# Patient Record
Sex: FEMALE | Race: WHITE | NOT HISPANIC OR LATINO | Employment: UNEMPLOYED | URBAN - NONMETROPOLITAN AREA
[De-identification: names, ages, dates, MRNs, and addresses within clinical notes are randomized per-mention and may not be internally consistent; named-entity substitution may affect disease eponyms.]

---

## 2018-01-07 ENCOUNTER — APPOINTMENT (OUTPATIENT)
Dept: GENERAL RADIOLOGY | Facility: HOSPITAL | Age: 63
End: 2018-01-07

## 2018-01-07 ENCOUNTER — HOSPITAL ENCOUNTER (INPATIENT)
Facility: HOSPITAL | Age: 63
LOS: 9 days | Discharge: NURSING, REHAB OR LONG-TERM CARE | End: 2018-01-17
Attending: EMERGENCY MEDICINE | Admitting: INTERNAL MEDICINE

## 2018-01-07 DIAGNOSIS — R13.10 DYSPHAGIA, UNSPECIFIED TYPE: Primary | ICD-10-CM

## 2018-01-07 DIAGNOSIS — J69.0 ASPIRATION PNEUMONIA OF RIGHT LOWER LOBE, UNSPECIFIED ASPIRATION PNEUMONIA TYPE (HCC): ICD-10-CM

## 2018-01-07 DIAGNOSIS — A41.9 SEPSIS, DUE TO UNSPECIFIED ORGANISM: ICD-10-CM

## 2018-01-07 LAB
A-A DO2: 61.9 MMHG (ref 0–300)
ALBUMIN SERPL-MCNC: 3.8 G/DL (ref 3.4–4.8)
ALBUMIN/GLOB SERPL: 1 G/DL (ref 1.5–2.5)
ALP SERPL-CCNC: 82 U/L (ref 35–104)
ALT SERPL W P-5'-P-CCNC: 42 U/L (ref 10–36)
ANION GAP SERPL CALCULATED.3IONS-SCNC: 11.8 MMOL/L (ref 3.6–11.2)
ARTERIAL PATENCY WRIST A: ABNORMAL
AST SERPL-CCNC: 35 U/L (ref 10–30)
ATMOSPHERIC PRESS: 735 MMHG
BACTERIA UR QL AUTO: ABNORMAL /HPF
BASE EXCESS BLDA CALC-SCNC: 0.8 MMOL/L
BASOPHILS # BLD AUTO: 0.03 10*3/MM3 (ref 0–0.3)
BASOPHILS # BLD AUTO: 0.04 10*3/MM3 (ref 0–0.3)
BASOPHILS NFR BLD AUTO: 0.2 % (ref 0–2)
BASOPHILS NFR BLD AUTO: 0.2 % (ref 0–2)
BDY SITE: ABNORMAL
BILIRUB SERPL-MCNC: 0.6 MG/DL (ref 0.2–1.8)
BILIRUB UR QL STRIP: NEGATIVE
BODY TEMPERATURE: 98.6 C
BUN BLD-MCNC: 20 MG/DL (ref 7–21)
BUN/CREAT SERPL: 24.7 (ref 7–25)
CALCIUM SPEC-SCNC: 9 MG/DL (ref 7.7–10)
CHLORIDE SERPL-SCNC: 126 MMOL/L (ref 99–112)
CLARITY UR: ABNORMAL
CO2 SERPL-SCNC: 24.2 MMOL/L (ref 24.3–31.9)
COHGB MFR BLD: 1.6 % (ref 0–5)
COLOR UR: ABNORMAL
CREAT BLD-MCNC: 0.81 MG/DL (ref 0.43–1.29)
D-LACTATE SERPL-SCNC: 2.7 MMOL/L (ref 0.5–2)
DEPRECATED RDW RBC AUTO: 49.1 FL (ref 37–54)
DEPRECATED RDW RBC AUTO: 49.5 FL (ref 37–54)
EOSINOPHIL # BLD AUTO: 0 10*3/MM3 (ref 0–0.7)
EOSINOPHIL # BLD AUTO: 0 10*3/MM3 (ref 0–0.7)
EOSINOPHIL NFR BLD AUTO: 0 % (ref 0–5)
EOSINOPHIL NFR BLD AUTO: 0 % (ref 0–5)
ERYTHROCYTE [DISTWIDTH] IN BLOOD BY AUTOMATED COUNT: 14.6 % (ref 11.5–14.5)
ERYTHROCYTE [DISTWIDTH] IN BLOOD BY AUTOMATED COUNT: 14.9 % (ref 11.5–14.5)
FLUAV AG NPH QL: NEGATIVE
FLUBV AG NPH QL IA: NEGATIVE
GFR SERPL CREATININE-BSD FRML MDRD: 72 ML/MIN/1.73
GLOBULIN UR ELPH-MCNC: 3.8 GM/DL
GLUCOSE BLD-MCNC: 180 MG/DL (ref 70–110)
GLUCOSE UR STRIP-MCNC: NEGATIVE MG/DL
HCO3 BLDA-SCNC: 22.9 MMOL/L (ref 22–26)
HCT VFR BLD AUTO: 48.5 % (ref 37–47)
HCT VFR BLD AUTO: 49.5 % (ref 37–47)
HCT VFR BLD CALC: 45 % (ref 37–47)
HGB BLD-MCNC: 15.2 G/DL (ref 12–16)
HGB BLD-MCNC: 15.6 G/DL (ref 12–16)
HGB BLDA-MCNC: 15.4 G/DL (ref 12–16)
HGB UR QL STRIP.AUTO: ABNORMAL
HOLD SPECIMEN: NORMAL
HOLD SPECIMEN: NORMAL
HOROWITZ INDEX BLD+IHG-RTO: 21 %
HYALINE CASTS UR QL AUTO: ABNORMAL /LPF
IMM GRANULOCYTES # BLD: 0.07 10*3/MM3 (ref 0–0.03)
IMM GRANULOCYTES # BLD: 0.07 10*3/MM3 (ref 0–0.03)
IMM GRANULOCYTES NFR BLD: 0.3 % (ref 0–0.5)
IMM GRANULOCYTES NFR BLD: 0.4 % (ref 0–0.5)
KETONES UR QL STRIP: NEGATIVE
LEUKOCYTE ESTERASE UR QL STRIP.AUTO: ABNORMAL
LYMPHOCYTES # BLD AUTO: 2.29 10*3/MM3 (ref 1–3)
LYMPHOCYTES # BLD AUTO: 2.49 10*3/MM3 (ref 1–3)
LYMPHOCYTES NFR BLD AUTO: 11.6 % (ref 21–51)
LYMPHOCYTES NFR BLD AUTO: 11.7 % (ref 21–51)
MCH RBC QN AUTO: 29.4 PG (ref 27–33)
MCH RBC QN AUTO: 29.6 PG (ref 27–33)
MCHC RBC AUTO-ENTMCNC: 31.3 G/DL (ref 33–37)
MCHC RBC AUTO-ENTMCNC: 31.5 G/DL (ref 33–37)
MCV RBC AUTO: 93.8 FL (ref 80–94)
MCV RBC AUTO: 93.9 FL (ref 80–94)
METHGB BLD QL: 0.5 % (ref 0–3)
MODALITY: ABNORMAL
MONOCYTES # BLD AUTO: 1.14 10*3/MM3 (ref 0.1–0.9)
MONOCYTES # BLD AUTO: 1.14 10*3/MM3 (ref 0.1–0.9)
MONOCYTES NFR BLD AUTO: 5.4 % (ref 0–10)
MONOCYTES NFR BLD AUTO: 5.8 % (ref 0–10)
NEUTROPHILS # BLD AUTO: 16.15 10*3/MM3 (ref 1.4–6.5)
NEUTROPHILS # BLD AUTO: 17.49 10*3/MM3 (ref 1.4–6.5)
NEUTROPHILS NFR BLD AUTO: 82 % (ref 30–70)
NEUTROPHILS NFR BLD AUTO: 82.4 % (ref 30–70)
NITRITE UR QL STRIP: POSITIVE
OSMOLALITY SERPL CALC.SUM OF ELEC: 327.5 MOSM/KG (ref 273–305)
OXYHGB MFR BLDV: 82.9 % (ref 85–100)
PCO2 BLDA: 30.1 MM HG (ref 35–45)
PH BLDA: 7.5 PH UNITS (ref 7.35–7.45)
PH UR STRIP.AUTO: <=5 [PH] (ref 5–8)
PLATELET # BLD AUTO: 283 10*3/MM3 (ref 130–400)
PLATELET # BLD AUTO: 296 10*3/MM3 (ref 130–400)
PMV BLD AUTO: 11.5 FL (ref 6–10)
PMV BLD AUTO: 11.7 FL (ref 6–10)
PO2 BLDA: 46.5 MM HG (ref 80–100)
POTASSIUM BLD-SCNC: 3.7 MMOL/L (ref 3.5–5.3)
PROT SERPL-MCNC: 7.6 G/DL (ref 6–8)
PROT UR QL STRIP: ABNORMAL
RBC # BLD AUTO: 5.17 10*6/MM3 (ref 4.2–5.4)
RBC # BLD AUTO: 5.27 10*6/MM3 (ref 4.2–5.4)
RBC # UR: ABNORMAL /HPF
REF LAB TEST METHOD: ABNORMAL
SAO2 % BLDCOA: 84.7 % (ref 90–100)
SODIUM BLD-SCNC: 162 MMOL/L (ref 135–153)
SP GR UR STRIP: >1.03 (ref 1–1.03)
SQUAMOUS #/AREA URNS HPF: ABNORMAL /HPF
TROPONIN I SERPL-MCNC: 0.02 NG/ML
UROBILINOGEN UR QL STRIP: ABNORMAL
WBC NRBC COR # BLD: 19.68 10*3/MM3 (ref 4.5–12.5)
WBC NRBC COR # BLD: 21.23 10*3/MM3 (ref 4.5–12.5)
WBC UR QL AUTO: ABNORMAL /HPF
WHOLE BLOOD HOLD SPECIMEN: NORMAL
WHOLE BLOOD HOLD SPECIMEN: NORMAL

## 2018-01-07 PROCEDURE — 94799 UNLISTED PULMONARY SVC/PX: CPT

## 2018-01-07 PROCEDURE — 84484 ASSAY OF TROPONIN QUANT: CPT | Performed by: NURSE PRACTITIONER

## 2018-01-07 PROCEDURE — 83605 ASSAY OF LACTIC ACID: CPT | Performed by: NURSE PRACTITIONER

## 2018-01-07 PROCEDURE — 25010000002 VANCOMYCIN PER 500 MG: Performed by: NURSE PRACTITIONER

## 2018-01-07 PROCEDURE — 82805 BLOOD GASES W/O2 SATURATION: CPT | Performed by: NURSE PRACTITIONER

## 2018-01-07 PROCEDURE — 87186 SC STD MICRODIL/AGAR DIL: CPT | Performed by: NURSE PRACTITIONER

## 2018-01-07 PROCEDURE — 82375 ASSAY CARBOXYHB QUANT: CPT | Performed by: NURSE PRACTITIONER

## 2018-01-07 PROCEDURE — 81001 URINALYSIS AUTO W/SCOPE: CPT | Performed by: NURSE PRACTITIONER

## 2018-01-07 PROCEDURE — 87040 BLOOD CULTURE FOR BACTERIA: CPT | Performed by: NURSE PRACTITIONER

## 2018-01-07 PROCEDURE — 85025 COMPLETE CBC W/AUTO DIFF WBC: CPT | Performed by: NURSE PRACTITIONER

## 2018-01-07 PROCEDURE — 87804 INFLUENZA ASSAY W/OPTIC: CPT | Performed by: NURSE PRACTITIONER

## 2018-01-07 PROCEDURE — 36600 WITHDRAWAL OF ARTERIAL BLOOD: CPT | Performed by: NURSE PRACTITIONER

## 2018-01-07 PROCEDURE — 36415 COLL VENOUS BLD VENIPUNCTURE: CPT

## 2018-01-07 PROCEDURE — 87086 URINE CULTURE/COLONY COUNT: CPT | Performed by: NURSE PRACTITIONER

## 2018-01-07 PROCEDURE — 83050 HGB METHEMOGLOBIN QUAN: CPT | Performed by: NURSE PRACTITIONER

## 2018-01-07 PROCEDURE — 25010000002 PIPERACILLIN-TAZOBACTAM: Performed by: NURSE PRACTITIONER

## 2018-01-07 PROCEDURE — 94640 AIRWAY INHALATION TREATMENT: CPT

## 2018-01-07 PROCEDURE — 71045 X-RAY EXAM CHEST 1 VIEW: CPT | Performed by: RADIOLOGY

## 2018-01-07 PROCEDURE — 93005 ELECTROCARDIOGRAM TRACING: CPT | Performed by: NURSE PRACTITIONER

## 2018-01-07 PROCEDURE — 80053 COMPREHEN METABOLIC PANEL: CPT | Performed by: NURSE PRACTITIONER

## 2018-01-07 PROCEDURE — 71045 X-RAY EXAM CHEST 1 VIEW: CPT

## 2018-01-07 PROCEDURE — 85025 COMPLETE CBC W/AUTO DIFF WBC: CPT | Performed by: EMERGENCY MEDICINE

## 2018-01-07 PROCEDURE — 87077 CULTURE AEROBIC IDENTIFY: CPT | Performed by: NURSE PRACTITIONER

## 2018-01-07 PROCEDURE — 99285 EMERGENCY DEPT VISIT HI MDM: CPT

## 2018-01-07 PROCEDURE — 93010 ELECTROCARDIOGRAM REPORT: CPT | Performed by: INTERNAL MEDICINE

## 2018-01-07 RX ORDER — IPRATROPIUM BROMIDE AND ALBUTEROL SULFATE 2.5; .5 MG/3ML; MG/3ML
3 SOLUTION RESPIRATORY (INHALATION) ONCE
Status: COMPLETED | OUTPATIENT
Start: 2018-01-07 | End: 2018-01-07

## 2018-01-07 RX ORDER — SENNOSIDES 8.6 MG
1 TABLET ORAL DAILY
COMMUNITY

## 2018-01-07 RX ORDER — LEVOTHYROXINE SODIUM 0.12 MG/1
125 TABLET ORAL DAILY
Status: ON HOLD | COMMUNITY
End: 2019-01-01

## 2018-01-07 RX ORDER — RAMIPRIL 5 MG/1
5 CAPSULE ORAL DAILY
COMMUNITY

## 2018-01-07 RX ORDER — BACLOFEN 10 MG/1
5 TABLET ORAL 2 TIMES DAILY
COMMUNITY

## 2018-01-07 RX ORDER — SODIUM CHLORIDE 0.9 % (FLUSH) 0.9 %
10 SYRINGE (ML) INJECTION AS NEEDED
Status: DISCONTINUED | OUTPATIENT
Start: 2018-01-07 | End: 2018-01-07

## 2018-01-07 RX ORDER — SODIUM CHLORIDE 0.9 % (FLUSH) 0.9 %
10 SYRINGE (ML) INJECTION AS NEEDED
Status: DISCONTINUED | OUTPATIENT
Start: 2018-01-07 | End: 2018-01-17 | Stop reason: HOSPADM

## 2018-01-07 RX ORDER — MELATONIN
1000 DAILY
COMMUNITY

## 2018-01-07 RX ORDER — DIPHENHYDRAMINE HCL 25 MG
25 CAPSULE ORAL
COMMUNITY

## 2018-01-07 RX ORDER — ACETAMINOPHEN 650 MG/1
650 SUPPOSITORY RECTAL ONCE
Status: COMPLETED | OUTPATIENT
Start: 2018-01-07 | End: 2018-01-07

## 2018-01-07 RX ORDER — TRAMADOL HYDROCHLORIDE 50 MG/1
50 TABLET ORAL 3 TIMES DAILY
COMMUNITY

## 2018-01-07 RX ADMIN — ACETAMINOPHEN 650 MG: 650 SUPPOSITORY RECTAL at 21:11

## 2018-01-07 RX ADMIN — TAZOBACTAM SODIUM AND PIPERACILLIN SODIUM 4.5 G: .5; 4 INJECTION, POWDER, LYOPHILIZED, FOR SOLUTION INTRAVENOUS at 22:08

## 2018-01-07 RX ADMIN — IPRATROPIUM BROMIDE AND ALBUTEROL SULFATE 3 ML: .5; 3 SOLUTION RESPIRATORY (INHALATION) at 21:17

## 2018-01-07 RX ADMIN — VANCOMYCIN HYDROCHLORIDE 1000 MG: 5 INJECTION, POWDER, LYOPHILIZED, FOR SOLUTION INTRAVENOUS at 22:23

## 2018-01-07 RX ADMIN — SODIUM CHLORIDE 1000 ML: 9 INJECTION, SOLUTION INTRAVENOUS at 21:09

## 2018-01-07 RX ADMIN — SODIUM CHLORIDE 1000 ML: 9 INJECTION, SOLUTION INTRAVENOUS at 23:54

## 2018-01-07 RX ADMIN — IPRATROPIUM BROMIDE AND ALBUTEROL SULFATE 3 ML: .5; 3 SOLUTION RESPIRATORY (INHALATION) at 22:57

## 2018-01-08 ENCOUNTER — APPOINTMENT (OUTPATIENT)
Dept: GENERAL RADIOLOGY | Facility: HOSPITAL | Age: 63
End: 2018-01-08

## 2018-01-08 PROBLEM — J69.0 ASPIRATION PNEUMONIA OF RIGHT LOWER LOBE (HCC): Status: ACTIVE | Noted: 2018-01-08

## 2018-01-08 LAB
A-A DO2: >300 MMHG (ref 0–300)
ALBUMIN SERPL-MCNC: 3.2 G/DL (ref 3.4–4.8)
ALBUMIN/GLOB SERPL: 1 G/DL (ref 1.5–2.5)
ALP SERPL-CCNC: 66 U/L (ref 35–104)
ALT SERPL W P-5'-P-CCNC: 35 U/L (ref 10–36)
ANION GAP SERPL CALCULATED.3IONS-SCNC: 8.3 MMOL/L (ref 3.6–11.2)
ARTERIAL PATENCY WRIST A: ABNORMAL
AST SERPL-CCNC: 29 U/L (ref 10–30)
ATMOSPHERIC PRESS: 729 MMHG
BASE EXCESS BLDA CALC-SCNC: 0.8 MMOL/L
BASOPHILS # BLD AUTO: 0.04 10*3/MM3 (ref 0–0.3)
BASOPHILS NFR BLD AUTO: 0.2 % (ref 0–2)
BDY SITE: ABNORMAL
BILIRUB SERPL-MCNC: 0.7 MG/DL (ref 0.2–1.8)
BODY TEMPERATURE: 98.6 C
BUN BLD-MCNC: 15 MG/DL (ref 7–21)
BUN/CREAT SERPL: 23.8 (ref 7–25)
CALCIUM SPEC-SCNC: 7.8 MG/DL (ref 7.7–10)
CHLORIDE SERPL-SCNC: 131 MMOL/L (ref 99–112)
CK MB SERPL-CCNC: 0.77 NG/ML (ref 0–5)
CK MB SERPL-CCNC: 0.88 NG/ML (ref 0–5)
CK MB SERPL-RTO: 0.2 % (ref 0–3)
CK MB SERPL-RTO: 0.3 % (ref 0–3)
CK SERPL-CCNC: 274 U/L (ref 24–173)
CK SERPL-CCNC: 317 U/L (ref 24–173)
CO2 SERPL-SCNC: 21.7 MMOL/L (ref 24.3–31.9)
COHGB MFR BLD: 0.4 % (ref 0–5)
CREAT BLD-MCNC: 0.63 MG/DL (ref 0.43–1.29)
CRP SERPL-MCNC: 4.22 MG/DL (ref 0–0.99)
D-LACTATE SERPL-SCNC: 2.4 MMOL/L (ref 0.5–2)
DEPRECATED RDW RBC AUTO: 50.2 FL (ref 37–54)
EOSINOPHIL # BLD AUTO: 0 10*3/MM3 (ref 0–0.7)
EOSINOPHIL NFR BLD AUTO: 0 % (ref 0–5)
ERYTHROCYTE [DISTWIDTH] IN BLOOD BY AUTOMATED COUNT: 14.9 % (ref 11.5–14.5)
GFR SERPL CREATININE-BSD FRML MDRD: 96 ML/MIN/1.73
GLOBULIN UR ELPH-MCNC: 3.1 GM/DL
GLUCOSE BLD-MCNC: 154 MG/DL (ref 70–110)
HCO3 BLDA-SCNC: 22.8 MMOL/L (ref 22–26)
HCT VFR BLD AUTO: 43.8 % (ref 37–47)
HCT VFR BLD CALC: 39 % (ref 37–47)
HGB BLD-MCNC: 13.2 G/DL (ref 12–16)
HGB BLDA-MCNC: 13.4 G/DL (ref 12–16)
HOLD SPECIMEN: NORMAL
HOROWITZ INDEX BLD+IHG-RTO: 100 %
IMM GRANULOCYTES # BLD: 0.05 10*3/MM3 (ref 0–0.03)
IMM GRANULOCYTES NFR BLD: 0.3 % (ref 0–0.5)
LYMPHOCYTES # BLD AUTO: 1.65 10*3/MM3 (ref 1–3)
LYMPHOCYTES NFR BLD AUTO: 9.6 % (ref 21–51)
MAGNESIUM SERPL-MCNC: 2.2 MG/DL (ref 1.7–2.6)
MCH RBC QN AUTO: 28.9 PG (ref 27–33)
MCHC RBC AUTO-ENTMCNC: 30.1 G/DL (ref 33–37)
MCV RBC AUTO: 96.1 FL (ref 80–94)
METHGB BLD QL: 0.4 % (ref 0–3)
MODALITY: ABNORMAL
MONOCYTES # BLD AUTO: 1.16 10*3/MM3 (ref 0.1–0.9)
MONOCYTES NFR BLD AUTO: 6.8 % (ref 0–10)
NEUTROPHILS # BLD AUTO: 14.22 10*3/MM3 (ref 1.4–6.5)
NEUTROPHILS NFR BLD AUTO: 83.1 % (ref 30–70)
OSMOLALITY SERPL CALC.SUM OF ELEC: 322.4 MOSM/KG (ref 273–305)
OXYHGB MFR BLDV: 98.4 % (ref 85–100)
PCO2 BLDA: 29.4 MM HG (ref 35–45)
PH BLDA: 7.51 PH UNITS (ref 7.35–7.45)
PHOSPHATE SERPL-MCNC: 2.5 MG/DL (ref 2.7–4.5)
PLATELET # BLD AUTO: 208 10*3/MM3 (ref 130–400)
PMV BLD AUTO: 11.6 FL (ref 6–10)
PO2 BLDA: 155 MM HG (ref 80–100)
POTASSIUM BLD-SCNC: 3.3 MMOL/L (ref 3.5–5.3)
POTASSIUM UR-SCNC: 43.2 MMOL/L
PROT SERPL-MCNC: 6.3 G/DL (ref 6–8)
RBC # BLD AUTO: 4.56 10*6/MM3 (ref 4.2–5.4)
SAO2 % BLDCOA: 99.2 % (ref 90–100)
SODIUM BLD-SCNC: 155 MMOL/L (ref 135–153)
SODIUM BLD-SCNC: 161 MMOL/L (ref 135–153)
SODIUM UR-SCNC: 251 MMOL/L
TROPONIN I SERPL-MCNC: 0.02 NG/ML
TROPONIN I SERPL-MCNC: 0.02 NG/ML
TROPONIN I SERPL-MCNC: 0.03 NG/ML
TSH SERPL DL<=0.05 MIU/L-ACNC: 0.43 MIU/ML (ref 0.55–4.78)
WBC NRBC COR # BLD: 17.12 10*3/MM3 (ref 4.5–12.5)

## 2018-01-08 PROCEDURE — 25010000003 POTASSIUM CHLORIDE 10 MEQ/100ML SOLUTION

## 2018-01-08 PROCEDURE — 94799 UNLISTED PULMONARY SVC/PX: CPT

## 2018-01-08 PROCEDURE — 84300 ASSAY OF URINE SODIUM: CPT | Performed by: INTERNAL MEDICINE

## 2018-01-08 PROCEDURE — 36600 WITHDRAWAL OF ARTERIAL BLOOD: CPT | Performed by: INTERNAL MEDICINE

## 2018-01-08 PROCEDURE — 82805 BLOOD GASES W/O2 SATURATION: CPT | Performed by: INTERNAL MEDICINE

## 2018-01-08 PROCEDURE — 93010 ELECTROCARDIOGRAM REPORT: CPT | Performed by: INTERNAL MEDICINE

## 2018-01-08 PROCEDURE — 86140 C-REACTIVE PROTEIN: CPT | Performed by: INTERNAL MEDICINE

## 2018-01-08 PROCEDURE — 85025 COMPLETE CBC W/AUTO DIFF WBC: CPT | Performed by: INTERNAL MEDICINE

## 2018-01-08 PROCEDURE — 84295 ASSAY OF SERUM SODIUM: CPT | Performed by: INTERNAL MEDICINE

## 2018-01-08 PROCEDURE — 99254 IP/OBS CNSLTJ NEW/EST MOD 60: CPT | Performed by: INTERNAL MEDICINE

## 2018-01-08 PROCEDURE — 84100 ASSAY OF PHOSPHORUS: CPT | Performed by: INTERNAL MEDICINE

## 2018-01-08 PROCEDURE — 92610 EVALUATE SWALLOWING FUNCTION: CPT

## 2018-01-08 PROCEDURE — 71045 X-RAY EXAM CHEST 1 VIEW: CPT | Performed by: RADIOLOGY

## 2018-01-08 PROCEDURE — 25010000002 PIPERACILLIN SOD-TAZOBACTAM PER 1 G: Performed by: INTERNAL MEDICINE

## 2018-01-08 PROCEDURE — G8996 SWALLOW CURRENT STATUS: HCPCS

## 2018-01-08 PROCEDURE — 84443 ASSAY THYROID STIM HORMONE: CPT | Performed by: INTERNAL MEDICINE

## 2018-01-08 PROCEDURE — 25010000002 VANCOMYCIN PER 500 MG: Performed by: INTERNAL MEDICINE

## 2018-01-08 PROCEDURE — G8997 SWALLOW GOAL STATUS: HCPCS

## 2018-01-08 PROCEDURE — 84133 ASSAY OF URINE POTASSIUM: CPT | Performed by: INTERNAL MEDICINE

## 2018-01-08 PROCEDURE — 93005 ELECTROCARDIOGRAM TRACING: CPT | Performed by: PHYSICIAN ASSISTANT

## 2018-01-08 PROCEDURE — 25010000002 PIPERACILLIN-TAZOBACTAM

## 2018-01-08 PROCEDURE — 71045 X-RAY EXAM CHEST 1 VIEW: CPT

## 2018-01-08 PROCEDURE — 84484 ASSAY OF TROPONIN QUANT: CPT | Performed by: INTERNAL MEDICINE

## 2018-01-08 PROCEDURE — 83735 ASSAY OF MAGNESIUM: CPT | Performed by: INTERNAL MEDICINE

## 2018-01-08 PROCEDURE — 83605 ASSAY OF LACTIC ACID: CPT | Performed by: NURSE PRACTITIONER

## 2018-01-08 PROCEDURE — 99223 1ST HOSP IP/OBS HIGH 75: CPT | Performed by: INTERNAL MEDICINE

## 2018-01-08 PROCEDURE — 82553 CREATINE MB FRACTION: CPT | Performed by: INTERNAL MEDICINE

## 2018-01-08 PROCEDURE — 82375 ASSAY CARBOXYHB QUANT: CPT | Performed by: INTERNAL MEDICINE

## 2018-01-08 PROCEDURE — 80053 COMPREHEN METABOLIC PANEL: CPT | Performed by: INTERNAL MEDICINE

## 2018-01-08 PROCEDURE — 83050 HGB METHEMOGLOBIN QUAN: CPT | Performed by: INTERNAL MEDICINE

## 2018-01-08 PROCEDURE — 82550 ASSAY OF CK (CPK): CPT | Performed by: INTERNAL MEDICINE

## 2018-01-08 RX ORDER — LEVOTHYROXINE SODIUM 0.12 MG/1
125 TABLET ORAL EVERY MORNING
Status: DISCONTINUED | OUTPATIENT
Start: 2018-01-08 | End: 2018-01-08

## 2018-01-08 RX ORDER — SODIUM CHLORIDE 0.9 % (FLUSH) 0.9 %
1-10 SYRINGE (ML) INJECTION AS NEEDED
Status: DISCONTINUED | OUTPATIENT
Start: 2018-01-08 | End: 2018-01-17 | Stop reason: HOSPADM

## 2018-01-08 RX ORDER — ASPIRIN 81 MG/1
81 TABLET, CHEWABLE ORAL DAILY
Status: DISCONTINUED | OUTPATIENT
Start: 2018-01-08 | End: 2018-01-17 | Stop reason: HOSPADM

## 2018-01-08 RX ORDER — POTASSIUM CHLORIDE 750 MG/1
10 TABLET, FILM COATED, EXTENDED RELEASE ORAL EVERY 4 HOURS
Status: DISCONTINUED | OUTPATIENT
Start: 2018-01-08 | End: 2018-01-08

## 2018-01-08 RX ORDER — POTASSIUM CHLORIDE 7.45 MG/ML
10 INJECTION INTRAVENOUS
Status: DISCONTINUED | OUTPATIENT
Start: 2018-01-08 | End: 2018-01-17 | Stop reason: HOSPADM

## 2018-01-08 RX ORDER — ACETAMINOPHEN 160 MG/5ML
650 SOLUTION ORAL EVERY 6 HOURS PRN
Status: DISCONTINUED | OUTPATIENT
Start: 2018-01-08 | End: 2018-01-17 | Stop reason: HOSPADM

## 2018-01-08 RX ORDER — TRAMADOL HYDROCHLORIDE 50 MG/1
50 TABLET ORAL EVERY 8 HOURS SCHEDULED
Status: DISCONTINUED | OUTPATIENT
Start: 2018-01-08 | End: 2018-01-08

## 2018-01-08 RX ORDER — IPRATROPIUM BROMIDE AND ALBUTEROL SULFATE 2.5; .5 MG/3ML; MG/3ML
3 SOLUTION RESPIRATORY (INHALATION) EVERY 6 HOURS PRN
Status: DISCONTINUED | OUTPATIENT
Start: 2018-01-08 | End: 2018-01-17 | Stop reason: HOSPADM

## 2018-01-08 RX ORDER — MAGNESIUM SULFATE HEPTAHYDRATE 40 MG/ML
4 INJECTION, SOLUTION INTRAVENOUS AS NEEDED
Status: DISCONTINUED | OUTPATIENT
Start: 2018-01-08 | End: 2018-01-17 | Stop reason: HOSPADM

## 2018-01-08 RX ORDER — POTASSIUM CHLORIDE 20 MEQ/1
40 TABLET, EXTENDED RELEASE ORAL EVERY 4 HOURS
Status: DISCONTINUED | OUTPATIENT
Start: 2018-01-08 | End: 2018-01-08

## 2018-01-08 RX ORDER — POTASSIUM CHLORIDE 1.5 G/1.77G
40 POWDER, FOR SOLUTION ORAL AS NEEDED
Status: DISCONTINUED | OUTPATIENT
Start: 2018-01-08 | End: 2018-01-17 | Stop reason: HOSPADM

## 2018-01-08 RX ORDER — L.ACID,PARA/B.BIFIDUM/S.THERM 8B CELL
1 CAPSULE ORAL DAILY
Status: DISCONTINUED | OUTPATIENT
Start: 2018-01-08 | End: 2018-01-08

## 2018-01-08 RX ORDER — MAGNESIUM SULFATE HEPTAHYDRATE 40 MG/ML
2 INJECTION, SOLUTION INTRAVENOUS AS NEEDED
Status: DISCONTINUED | OUTPATIENT
Start: 2018-01-08 | End: 2018-01-17 | Stop reason: HOSPADM

## 2018-01-08 RX ORDER — LEVOTHYROXINE SODIUM ANHYDROUS 100 UG/5ML
50 INJECTION, POWDER, LYOPHILIZED, FOR SOLUTION INTRAVENOUS
Status: DISCONTINUED | OUTPATIENT
Start: 2018-01-08 | End: 2018-01-09

## 2018-01-08 RX ORDER — POTASSIUM CHLORIDE 750 MG/1
40 CAPSULE, EXTENDED RELEASE ORAL AS NEEDED
Status: DISCONTINUED | OUTPATIENT
Start: 2018-01-08 | End: 2018-01-17 | Stop reason: HOSPADM

## 2018-01-08 RX ORDER — SENNA PLUS 8.6 MG/1
1 TABLET ORAL DAILY
Status: DISCONTINUED | OUTPATIENT
Start: 2018-01-08 | End: 2018-01-08

## 2018-01-08 RX ORDER — TRAMADOL HYDROCHLORIDE 50 MG/1
50 TABLET ORAL 3 TIMES DAILY
Status: CANCELLED | OUTPATIENT
Start: 2018-01-08

## 2018-01-08 RX ORDER — DEXTROSE AND SODIUM CHLORIDE 5; .2 G/100ML; G/100ML
80 INJECTION, SOLUTION INTRAVENOUS CONTINUOUS
Status: DISCONTINUED | OUTPATIENT
Start: 2018-01-08 | End: 2018-01-09

## 2018-01-08 RX ORDER — MULTIVITAMIN
1 TABLET ORAL DAILY
Status: DISCONTINUED | OUTPATIENT
Start: 2018-01-08 | End: 2018-01-08

## 2018-01-08 RX ORDER — POTASSIUM CHLORIDE 7.45 MG/ML
10 INJECTION INTRAVENOUS
Status: COMPLETED | OUTPATIENT
Start: 2018-01-08 | End: 2018-01-08

## 2018-01-08 RX ORDER — MULTIVITAMIN
1 TABLET ORAL DAILY
COMMUNITY

## 2018-01-08 RX ADMIN — POTASSIUM CHLORIDE 10 MEQ: 10 INJECTION, SOLUTION INTRAVENOUS at 20:15

## 2018-01-08 RX ADMIN — ASCORBIC ACID, VITAMIN A PALMITATE, CHOLECALCIFEROL, THIAMINE HYDROCHLORIDE, RIBOFLAVIN-5 PHOSPHATE SODIUM, PYRIDOXINE HYDROCHLORIDE, NIACINAMIDE, DEXPANTHENOL, ALPHA-TOCOPHEROL ACETATE, VITAMIN K1, FOLIC ACID, BIOTIN, CYANOCOBALAMIN: 200; 3300; 200; 6; 3.6; 6; 40; 15; 10; 150; 600; 60; 5 INJECTION, SOLUTION INTRAVENOUS at 03:32

## 2018-01-08 RX ADMIN — PIPERACILLIN SODIUM,TAZOBACTAM SODIUM 3.38 G: 3; .375 INJECTION, POWDER, FOR SOLUTION INTRAVENOUS at 05:49

## 2018-01-08 RX ADMIN — POTASSIUM PHOSPHATE, MONOBASIC AND POTASSIUM PHOSPHATE, DIBASIC 15 MMOL: 224; 236 INJECTION, SOLUTION INTRAVENOUS at 11:12

## 2018-01-08 RX ADMIN — DEXTROSE AND SODIUM CHLORIDE 100 ML/HR: 5; 200 INJECTION, SOLUTION INTRAVENOUS at 12:27

## 2018-01-08 RX ADMIN — PIPERACILLIN SODIUM,TAZOBACTAM SODIUM 3.38 G: 3; .375 INJECTION, POWDER, FOR SOLUTION INTRAVENOUS at 15:16

## 2018-01-08 RX ADMIN — POTASSIUM CHLORIDE 10 MEQ: 10 INJECTION, SOLUTION INTRAVENOUS at 20:14

## 2018-01-08 RX ADMIN — ACETAMINOPHEN 650 MG: 650 SOLUTION ORAL at 22:04

## 2018-01-08 RX ADMIN — ASPIRIN 81 MG: 81 TABLET, CHEWABLE ORAL at 22:06

## 2018-01-08 RX ADMIN — VANCOMYCIN HYDROCHLORIDE 750 MG: 1 INJECTION, POWDER, LYOPHILIZED, FOR SOLUTION INTRAVENOUS at 22:04

## 2018-01-08 RX ADMIN — IPRATROPIUM BROMIDE AND ALBUTEROL SULFATE 3 ML: .5; 3 SOLUTION RESPIRATORY (INHALATION) at 07:07

## 2018-01-08 RX ADMIN — LEVOTHYROXINE SODIUM ANHYDROUS 50 MCG: 100 INJECTION, POWDER, LYOPHILIZED, FOR SOLUTION INTRAVENOUS at 15:56

## 2018-01-08 RX ADMIN — PIPERACILLIN SODIUM,TAZOBACTAM SODIUM 3.38 G: 3; .375 INJECTION, POWDER, FOR SOLUTION INTRAVENOUS at 22:04

## 2018-01-08 RX ADMIN — POTASSIUM CHLORIDE 10 MEQ: 10 INJECTION, SOLUTION INTRAVENOUS at 17:05

## 2018-01-08 RX ADMIN — POTASSIUM CHLORIDE 10 MEQ: 10 INJECTION, SOLUTION INTRAVENOUS at 18:05

## 2018-01-08 NOTE — PAYOR COMM NOTE
"[facility]  NPI:[id number]    Utilization Review  Contact: Melvi Saavedra RN  Phone: [phone]  Fax:[phone]    INITIATE INPATIENT AUTHORIZATION    PENDING REFERENCE# [id number]    Baldo Eden (62 y.o. Female)     Date of Birth Social Security Number Address Home Phone MRN    1955  [address] [facility] [address] [phone] [id number]    [employer] Marital Status          Unknown        Admission Date Admission Type Admitting Provider Attending Provider Department, Room/Bed    1/8/18 Emergency Tammy Bueno MD Perkins, Jimmye S, MD [place] [id number]/    Discharge Date Discharge Disposition Discharge Destination                      Attending Provider: Tammy Bueno MD     Allergies:  Aricept [Donepezil Hcl]    Isolation:  None   Infection:  None   Code Status:  Comfort Veronica    Ht:  180.3 cm (71\")   Wt:  45.9 kg (101 lb 3.2 oz)    Admission Cmt:  None   Principal Problem:  None                Active Insurance as of 1/7/2018     Primary Coverage     Payor Plan Insurance Group Employer/Plan Group    [facility] BLUE [employer] [facility] [employer] EMPLOYEE [id number]     Payor Plan Address Payor Plan Phone Number Effective From Effective To    [address] [phone] 1/1/2018     [place] [address]       Subscriber Name Subscriber Birth Date Member ID       BALDO EDEN 1955 [id number]           Secondary Coverage     Payor Plan Insurance Group Employer/Plan Group    [place]      Payor Plan Address Payor Plan Phone Number Effective From Effective To    [address] [phone] 10/7/2016     [place] [address]       Subscriber Name Subscriber Birth Date Member ID       BALDO EDEN 1955 [id number]                 Emergency Contacts      (Rel.) Home Phone Work Phone Mobile Phone    Contacts,No (Other) [phone] -- --               History & Physical      Tammy Bueno MD at 1/8/2018  " 1:46 AM              [facility] HOSPITALIST HISTORY AND PHYSICAL    Patient Identification:  Name:  Baldo Eden  Age:  62 y.o.  Sex:  female  :  1955  MRN:  [id number]   Visit Number:  [id number]  Primary Care Physician:  Kashmir Garcia MD     Chief complaint:  Aspirated at the nursing home    History of presenting illness:  62 y.o. female, resident of [facility] since 4/10/2015, who presented to [facility] ED via ambulance for choking on food; she was eating a pureed diet.  The patient is nonverbal due to dementia and I was not able to obtain any history from her; no family is at bedside.  The patient is a DNR at the nursing home; the family was asked by the ED staff and they do not desire any levophed or aggressive treatment other than antibiotics and fluids.  In the ED, CXR showed a right lower lobe infiltrate and she was septic.  Cultures were obtained and she was given zosyn and vancomycin and then placed on the med-surg floor.  ---------------------------------------------------------------------------------------------------------------------   Review of Systems   Unable to perform ROS: Dementia    ---------------------------------------------------------------------------------------------------------------------   Past Medical History:   Diagnosis Date   • Alzheimer disease    • Anxiety    • Dementia    • Dermatopolymyositis    • Dysphagia    • Hypertension    • Hypothyroid    • Malnutrition    • Muscle spasm    • Quadriparesis    • Vitamin D deficiency      History reviewed. No pertinent surgical history.     Family History   Problem Relation Age of Onset   • Family history unknown: Yes     Social History     Social History   • Marital status:      Social History Main Topics   • Smoking status: Never Smoker   • Smokeless tobacco: Never Used   • Alcohol use No   • Drug use: No   • Sexual activity: Defer    ---------------------------------------------------------------------------------------------------------------------   Allergies:  Aricept [donepezil hcl]  ---------------------------------------------------------------------------------------------------------------------   Prior to Admission Medications     Prescriptions Last Dose Informant Patient Reported? Taking?    baclofen (LIORESAL) 10 MG tablet 1/8/2018 Nursing Home Yes Yes    Take 5 mg by mouth 2 (Two) Times a Day.    cholecalciferol (VITAMIN D3) 1000 units tablet 1/8/2018 Nursing Home Yes Yes    Take 1,000 Units by mouth Daily.    diphenhydrAMINE (BENADRYL) 25 mg capsule 1/7/2018 Nursing Home Yes Yes    Take 25 mg by mouth every night at bedtime.    levothyroxine (SYNTHROID, LEVOTHROID) 125 MCG tablet 1/8/2018 Nursing Home Yes Yes    Take 125 mcg by mouth Daily.    multivitamin (DAILY BLACK) tablet tablet 1/8/2018 Nursing Home Yes Yes    Take 1 tablet by mouth Daily.    ramipril (ALTACE) 5 MG capsule 1/8/2018 Nursing Home Yes Yes    Take 5 mg by mouth Daily.    senna (SENOKOT) 8.6 MG tablet tablet 1/8/2018 Nursing Home Yes Yes    Take 1 tablet by mouth Daily.    traMADol (ULTRAM) 50 MG tablet 1/8/2018 Nursing Home Yes Yes    Take 50 mg by mouth 3 (Three) Times a Day.   ---------------------------------------------------------------------------------------------------------------------   Vital Signs:  Temp:  [100 °F (37.8 °C)-101.8 °F (38.8 °C)] 100 °F (37.8 °C)  Heart Rate:  [] 97  Resp:  [26-32] 26  BP: ()/(58-71) 108/60  Last 3 weights    01/07/18 2050   Weight: 45.8 kg (101 lb)     Body mass index is 14.09 kg/(m^2).  ---------------------------------------------------------------------------------------------------------------------   Physical Exam:  Constitutional:  Well-developed and well-nourished.  Severe respiratory distress.  Bilateral temporal muscle wasting.  HENT:  Head: Normocephalic and atraumatic.  Mouth:  Moist mucous  membranes.    Eyes:  Conjunctivae and EOM are normal.  Pupils are equal, round, and reactive to light.  No scleral icterus.  Neck:  Neck supple.  No JVD present.    Cardiovascular:  Normal rate, regular rhythm and normal heart sounds with no murmur.  Pulmonary/Chest:  Severe respiratory distress, no wheezes, crackles throughout and fair air movement.  Abdominal:  Soft.  Bowel sounds are normal.  No distension and no tenderness.   Musculoskeletal:  No edema, no tenderness, and no deformity.  No red or swollen joints anywhere.  Atrophy of muscles in the arms and legs and loss of fat on these extremities.  Neurological:  Obtunded and not able to follow commands; moves arms and legs but not on command.  Nonverbal.    Skin:  Skin is warm and dry.  No rash noted.  No pallor.   Peripheral vascular:  No edema and strong pulses on all 4 extremities.  Genitourinary:   No bazan catheter in place.  ---------------------------------------------------------------------------------------------------------------------  EKG:  NS with a heart rate of 97, QTc 566 ms  Telemetry:  Not ordered  I have personally looked at the EKG.  ---------------------------------------------------------------------------------------------------------------------  Results from last 7 days  Lab Units 01/07/18  2119   LACTATE mmol/L 2.7*   WBC 10*3/mm3 19.68*  21.23*   HEMOGLOBIN g/dL 15.6  15.2   HEMATOCRIT % 49.5*  48.5*   MCV fL 93.9  93.8   MCHC g/dL 31.5*  31.3*   PLATELETS 10*3/mm3 283  296     Results from last 7 days  Lab Units 01/07/18  2119   SODIUM mmol/L 162*   POTASSIUM mmol/L 3.7   CHLORIDE mmol/L 126*   CO2 mmol/L 24.2*   BUN mg/dL 20   CREATININE mg/dL 0.81   EGFR IF NONAFRICN AM mL/min/1.73 72   CALCIUM mg/dL 9.0   GLUCOSE mg/dL 180*   ALBUMIN g/dL 3.80   BILIRUBIN mg/dL 0.6   ALK PHOS U/L 82   AST (SGOT) U/L 35*   ALT (SGPT) U/L 42*   Estimated Creatinine Clearance: 52.1 mL/min (by C-G formula based on Cr of 0.81).    Results from  last 7 days  Lab Units 01/07/18  2326 01/07/18 2119   TROPONIN I ng/mL 0.032 0.023         I have personally looked at the labs and they are stated above.  ---------------------------------------------------------------------------------------------------------------------  Imaging Results (last 7 days)     Procedure Component Value Units Date/Time    XR Chest 1 View [[id number]] Collected:  01/07/18 2355     Updated:  01/07/18 2357    Narrative:       CLINICAL INDICATION:     congestion, soa  COMPARISON: NONE   FINDINGS:   Patchy right infrahilar opacities.   Heart and mediastinal contours are unremarkable.   No pneumothorax.   No pleural effusion.   No acute osseous findings.    Impression:       Right infrahilar airspace disease.  This report was finalized on 1/7/2018 11:55 PM by Dr. Rubén Vickers MD.      I have personally reviewed the radiology images and read the final radiology report.  ---------------------------------------------------------------------------------------------------------------------  Assessment and Plan:  -Sepsis and acute hypoxic respiratory failure due to right lower lobe pneumonia suspected to be due to aspiration  -Hypernatremia due to dehydration  -Liver enzyme elevation  -Possible UTI  -Functional quadriplegia  -History of dysphagia  -Essential hypertension  -Anxiety    Will start on zosyn and vancomycin and give IVF for the dehydration.  Will repeat in the morning to see if she is improving.  DNR is noted.  Will consult speech therapy; if it is determined that she needs a Gtube then we will talk to the family about this.  Will consult palliative care.    Tammy Bueno MD  01/08/18  1:46 AM       Electronically signed by Tammy Bueno MD at 1/8/2018  2:43 AM           Emergency Department Notes      JOANA Velásquez at 1/7/2018  8:52 PM     Attestation signed by Ken Dong MD at 1/8/2018  8:29 AM              For this patient encounter, I reviewed  the NP or PA documentation, treatment plan, and medical decision making. Ken Dong MD 1/8/2018 8:29 AM                               Subjective   Patient is a 62 y.o. female presenting with shortness of breath.   History provided by:  Nursing home and EMS personnel   used: No    Shortness of Breath   Severity:  Moderate  Onset quality:  Gradual  Duration:  2 days  Timing:  Intermittent  Progression:  Worsening  Chronicity:  New  Context comment:  Possible aspiration at nursing home  Relieved by:  Nothing  Worsened by:  Nothing  Ineffective treatments:  None tried  Associated symptoms: cough, sputum production and wheezing    Associated symptoms: no abdominal pain, no chest pain and no fever    Risk factors comment:  Nursing home patient      Review of Systems   Constitutional: Negative.  Negative for fever.   HENT: Negative.    Respiratory: Positive for cough, sputum production, shortness of breath and wheezing.    Cardiovascular: Negative.  Negative for chest pain.   Gastrointestinal: Negative.  Negative for abdominal pain.   Endocrine: Negative.    Genitourinary: Negative.  Negative for dysuria.   Skin: Negative.    Neurological: Negative.    Psychiatric/Behavioral: Negative.    All other systems reviewed and are negative.      Past Medical History:   Diagnosis Date   • Alzheimer disease    • Anxiety    • Dementia    • Hypothyroid        Allergies   Allergen Reactions   • Aricept [Donepezil Hcl]        History reviewed. No pertinent surgical history.    History reviewed. No pertinent family history.    Social History     Social History   • Marital status:      Spouse name: N/A   • Number of children: N/A   • Years of education: N/A     Social History Main Topics   • Smoking status: Never Smoker   • Smokeless tobacco: Never Used   • Alcohol use No   • Drug use: No   • Sexual activity: Defer     Other Topics Concern   • None     Social History Narrative   • None            Objective   Physical Exam   Constitutional:   Appears thin and frail. Appears chronically ill   HENT:   Head: Normocephalic and atraumatic.   Eyes: Pupils are equal, round, and reactive to light.   Neck: Normal range of motion. Neck supple.   Cardiovascular: Regular rhythm, normal heart sounds and intact distal pulses.    tachycardia   Pulmonary/Chest: She is in respiratory distress. She has wheezes. She has rales.   Moderate respiratory distress, rales and rhonchi throughout   Abdominal: Soft. Bowel sounds are normal.   Neurological: She is alert.   nonverbal   Skin: Skin is warm and dry.   Psychiatric:   Responds to painful stimuli. Eyes track. Family reports dementia at baseline   Nursing note and vitals reviewed.      Procedures        ED Course  ED Course   Value Comment By Time   XR Chest 1 View Right lower lobe pneumonia, reviewed by Dr. Letitia Herman, APRN 01/07 2208   ECG 12 Lead Sinus tachycardia rate 97 with frequent PVCs.ST-T wave abnormality. No acute ischemia. Reviewed by Dr. Dong at 2132 Mague Herman, APRN 01/07 2209    I spoke with family about aggressiveness of treatment and if they wanted a central line and pressors if needed. The family does not fully understand. I discussed with Dr. Bueno who will see patient in ED and discuss care with family. Mague Herman, APRN 01/07 4766    Family verbalizes to me and Brenda JOLLEY that patient is DNR and they do not want central line or pressors at this time Mague Herman, APRN 01/08 0052                  MDM  Number of Diagnoses or Management Options  Aspiration pneumonia of right lower lobe, unspecified aspiration pneumonia type: new and requires workup  Sepsis, due to unspecified organism: new and requires workup     Amount and/or Complexity of Data Reviewed  Clinical lab tests: ordered and reviewed  Tests in the radiology section of CPT®:  reviewed and ordered  Tests in the medicine section  of CPT®:  reviewed and ordered    Risk of Complications, Morbidity, and/or Mortality  Presenting problems: moderate  Diagnostic procedures: moderate  Management options: moderate    Patient Progress  Patient progress: improved      Final diagnoses:   Aspiration pneumonia of right lower lobe, unspecified aspiration pneumonia type   Sepsis, due to unspecified organism            Mague Herman, APRN  01/08/18 0051       Mague Herman, APRN  01/08/18 0052       Electronically signed by Ken Dong MD at 1/8/2018  8:29 AM      Felicita Burgess at 1/7/2018  8:54 PM          Notified respiratory of new orders     Felicita Burgess  01/07/18 2054       Electronically signed by Felicita Burgess at 1/7/2018  8:54 PM        Hospital Medications (all)       Dose Frequency Start End    acetaminophen (TYLENOL) suppository 650 mg 650 mg Once 1/7/2018 1/7/2018    Sig - Route: Insert 1 suppository into the rectum 1 (One) Time. - Rectal    Cosign for Ordering: Accepted by Ken Dong MD on 1/8/2018  8:31 AM    dextrose 5 % and sodium chloride 0.45 % 990 mL with multiple vitamin 10 mL infusion  Continuous 1/8/2018     Sig - Route: Infuse  into a venous catheter Continuous. - Intravenous    ipratropium-albuterol (DUO-NEB) nebulizer solution 3 mL 3 mL Once 1/7/2018 1/7/2018    Sig - Route: Take 3 mL by nebulization 1 (One) Time. - Nebulization    Cosign for Ordering: Accepted by Ken Dong MD on 1/8/2018  8:31 AM    ipratropium-albuterol (DUO-NEB) nebulizer solution 3 mL 3 mL Once 1/7/2018 1/7/2018    Sig - Route: Take 3 mL by nebulization 1 (One) Time. - Nebulization    Cosign for Ordering: Accepted by Ken Dong MD on 1/8/2018  8:31 AM    ipratropium-albuterol (DUO-NEB) nebulizer solution 3 mL 3 mL Every 6 Hours PRN 1/8/2018     Sig - Route: Take 3 mL by nebulization Every 6 (Six) Hours As Needed for Shortness of Air. - Nebulization    lactobacillus acidophilus (RISAQUAD) capsule  "1 capsule 1 capsule Daily 1/8/2018     Sig - Route: Take 1 capsule by mouth Daily. - Oral    levothyroxine (SYNTHROID, LEVOTHROID) tablet 125 mcg 125 mcg Every Morning 1/8/2018     Sig - Route: Take 1 tablet by mouth Every Morning. - Oral    Magnesium Sulfate 2 gram Bolus, followed by 8 gram infusion (total Mg dose 10 grams)- Mg less than or equal to 1mg/dL 2 g As Needed 1/8/2018     Sig - Route: Infuse 50 mL into a venous catheter As Needed (Mg less than or equal to 1mg/dL). - Intravenous    Cosign for Ordering: Required by Tammy Bueno MD    Linked Group 1:  \"Or\" Linked Group Details        magnesium sulfate 4 gram infusion- Mg 1.6-1.9 mg/dL 4 g As Needed 1/8/2018     Sig - Route: Infuse 100 mL into a venous catheter As Needed (Mg 1.6-1.9 mg/dL). - Intravenous    Cosign for Ordering: Required by Tammy Bueno MD    Linked Group 1:  \"Or\" Linked Group Details        Magnesium Sulfate 6 gram Infusion (2 gm x 3) -Mg 1.1 -1.5 mg/dL 2 g As Needed 1/8/2018     Sig - Route: Infuse 50 mL into a venous catheter As Needed (Mg 1.1 -1.5 mg/dL). - Intravenous    Cosign for Ordering: Required by Tammy Bueno MD    Linked Group 1:  \"Or\" Linked Group Details        multivitamin (DAILY BLACK) tablet 1 tablet 1 tablet Daily 1/8/2018     Sig - Route: Take 1 tablet by mouth Daily. - Oral    piperacillin-tazobactam (ZOSYN) 3.375 g/100 mL 0.9% NS IVPB (mbp) 3.375 g Every 8 Hours 1/8/2018 1/15/2018    Sig - Route: Infuse 100 mL into a venous catheter Every 8 (Eight) Hours. - Intravenous    piperacillin-tazobactam (ZOSYN) 4.5 g/100 mL 0.9% NS IVPB (mbp) 4.5 g Once 1/7/2018 1/7/2018    Sig - Route: Infuse 100 mL into a venous catheter 1 (One) Time. - Intravenous    Cosign for Ordering: Accepted by Ken Dong MD on 1/8/2018  8:31 AM    potassium chloride (K-DUR,KLOR-CON) CR tablet 40 mEq 40 mEq Every 4 Hours 1/8/2018 1/8/2018    Sig - Route: Take 2 tablets by mouth Every 4 (Four) Hours. - Oral    potassium chloride " "(KLOR-CON) packet 40 mEq 40 mEq As Needed 1/8/2018     Sig - Route: Take 40 mEq by mouth As Needed (potassium replacement, see admin instructions). - Oral    Cosign for Ordering: Required by Tammy Bueno MD    Linked Group 2:  \"Or\" Linked Group Details        potassium chloride (MICRO-K) CR capsule 40 mEq 40 mEq As Needed 1/8/2018     Sig - Route: Take 4 capsules by mouth As Needed (potassium replacement.  see admin instructions). - Oral    Cosign for Ordering: Required by Tammy Bueno MD    Linked Group 2:  \"Or\" Linked Group Details        potassium chloride 10 mEq in 100 mL IVPB 10 mEq Every 1 Hour PRN 1/8/2018     Sig - Route: Infuse 100 mL into a venous catheter Every 1 (One) Hour As Needed (potassium protocol PERIPHERAL - see admin instructions). - Intravenous    Cosign for Ordering: Required by Tammy Bueno MD    Linked Group 2:  \"Or\" Linked Group Details        potassium phosphate 15 mmol in sodium chloride 0.9 % 100 mL infusion 15 mmol As Needed 1/8/2018     Sig - Route: Infuse 15 mmol into a venous catheter As Needed (Peripheral IV - Phosphorus 1.8 - 2.5). - Intravenous    Cosign for Ordering: Required by Tammy Bueno MD    Linked Group 3:  \"Or\" Linked Group Details        potassium phosphate 15 mmol in sodium chloride 0.9 % 100 mL infusion 15 mmol Once 1/8/2018     Sig - Route: Infuse 15 mmol into a venous catheter 1 (One) Time. - Intravenous    potassium phosphate 30 mmol in sodium chloride 0.9 % 250 mL infusion 30 mmol As Needed 1/8/2018     Sig - Route: Infuse 30 mmol into a venous catheter As Needed (Peripheral IV - Phosphorus 1.3 - 1.7). - Intravenous    Cosign for Ordering: Required by Tammy Bueno MD    Linked Group 3:  \"Or\" Linked Group Details        potassium phosphate 45 mmol in sodium chloride 0.9 % 500 mL infusion 45 mmol As Needed 1/8/2018     Sig - Route: Infuse 45 mmol into a venous catheter As Needed (Peripheral IV - Phosphorus Less Than 1.3). - Intravenous    " "Cosign for Ordering: Required by Tammy Bueno MD    Linked Group 3:  \"Or\" Linked Group Details        senna (SENOKOT) tablet 1 tablet 1 tablet Daily 1/8/2018     Sig - Route: Take 1 tablet by mouth Daily. - Oral    sodium chloride 0.9 % bolus 1,000 mL 1,000 mL Once 1/7/2018 1/7/2018    Sig - Route: Infuse 1,000 mL into a venous catheter 1 (One) Time. - Intravenous    Cosign for Ordering: Accepted by Ken Dong MD on 1/8/2018  8:31 AM    sodium chloride 0.9 % bolus 1,000 mL 1,000 mL Once 1/7/2018 1/8/2018    Sig - Route: Infuse 1,000 mL into a venous catheter 1 (One) Time. - Intravenous    Cosign for Ordering: Accepted by Ken Dong MD on 1/8/2018  8:31 AM    sodium chloride 0.9 % flush 1-10 mL 1-10 mL As Needed 1/8/2018     Sig - Route: Infuse 1-10 mL into a venous catheter As Needed for Line Care. - Intravenous    sodium chloride 0.9 % flush 10 mL 10 mL As Needed 1/7/2018     Sig - Route: Infuse 10 mL into a venous catheter As Needed for Line Care. - Intravenous    Cosign for Ordering: Accepted by Ken Dong MD on 1/8/2018  8:31 AM    Linked Group 4:  \"And\" Linked Group Details        sodium phosphates 15 mmol in sodium chloride 0.9 % 250 mL IVPB 15 mmol As Needed 1/8/2018     Sig - Route: Infuse 15 mmol into a venous catheter As Needed (Peripheral IV - Phosphorus 1.8 - 2.5 & Potassium Greater Than 4). - Intravenous    Cosign for Ordering: Required by Tammy Bueno MD    Linked Group 3:  \"Or\" Linked Group Details        sodium phosphates 30 mmol in sodium chloride 0.9 % 250 mL IVPB 30 mmol As Needed 1/8/2018     Sig - Route: Infuse 30 mmol into a venous catheter As Needed (Peripheral IV - Phosphorus 1.3-1.7 & Potassium Greater Than 4). - Intravenous    Cosign for Ordering: Required by Tammy Bueno MD    Linked Group 3:  \"Or\" Linked Group Details        sodium phosphates 45 mmol in sodium chloride 0.9 % 500 mL IVPB 45 mmol As Needed 1/8/2018     Sig - Route: Infuse 45 " "mmol into a venous catheter As Needed (Peripheral IV - Phosphorus Less Than 1.3 & Potassium Greater Than 4). - Intravenous    Cosign for Ordering: Required by Tammy Bueno MD    Linked Group 3:  \"Or\" Linked Group Details        traMADol (ULTRAM) tablet 50 mg 50 mg Every 8 Hours Scheduled 1/8/2018     Sig - Route: Take 1 tablet by mouth Every 8 (Eight) Hours. - Oral    vancomycin (VANCOCIN) 1,000 mg in sodium chloride 0.9 % 250 mL IVPB 20 mg/kg × 45.8 kg Once 1/7/2018 1/7/2018    Sig - Route: Infuse 1,000 mg into a venous catheter 1 (One) Time. - Intravenous    Cosign for Ordering: Accepted by Ken Dong MD on 1/8/2018  8:31 AM    vancomycin (VANCOCIN) 750 mg in sodium chloride 0.9 % 250 mL IVPB 750 mg Every 18 Hours 1/8/2018 1/11/2018    Sig - Route: Infuse 750 mg into a venous catheter Every 18 (Eighteen) Hours. - Intravenous    Pharmacy to dose vancomycin (Discontinued)  Continuous PRN 1/8/2018 1/8/2018    Sig - Route: Continuous As Needed for Consult. - Does not apply    Pharmacy to Dose Zosyn (Discontinued)  Continuous PRN 1/8/2018 1/8/2018    Sig - Route: Continuous As Needed for Consult. - Does not apply    potassium chloride (K-DUR,KLOR-CON) ER tablet 10 mEq (Discontinued) 10 mEq Every 4 Hours 1/8/2018 1/8/2018    Sig - Route: Take 1 tablet by mouth Every 4 (Four) Hours. - Oral    sodium chloride 0.9 % flush 10 mL (Discontinued) 10 mL As Needed 1/7/2018 1/7/2018    Sig - Route: Infuse 10 mL into a venous catheter As Needed for Line Care. - Intravenous    Cosign for Ordering: Accepted by Ken Dong MD on 1/8/2018  8:31 AM          Physician Progress Notes (all)     No notes of this type exist for this encounter.        Consult Notes (all)     No notes of this type exist for this encounter.        "

## 2018-01-08 NOTE — NURSING NOTE
Pt has several abnormal labs, including critical sodium. Paged Dr. Bueno several times with no call back. Notified lead nurse Yordan Savage, who in turn notified House supervisor Tab Sevilla. Dr. Bueno aware of patient's condition and previous critical lactic acid. (Dr. Bueno in current emergency situation on another unit.)    0530: Spoke with Dr. Bueno regarding labs, new orders placed.

## 2018-01-08 NOTE — PLAN OF CARE
Problem: Patient Care Overview (Adult)  Goal: Discharge Needs Assessment  Outcome: Ongoing (interventions implemented as appropriate)  Discharge Planning Assessment  WILL Alfaro     Patient Name: Baldo Eden  MRN: [id number]  Today's Date: 1/8/2018    Admit Date: 1/7/2018          Discharge Needs Assessment       01/08/18 1020    Discharge Needs Assessment    Discharge Disposition skilled nursing facility   SS notified [facility] per Ivett who states pt has a 14 day skilled bed hold.             Discharge Plan       01/08/18 1020    Case Management/Social Work Plan    Plan Pt was admitted from [facility] and has a 14 day skilled bed hold. SS to follow and assist as needed with discharge planning.         Discharge Placement     Facility/Agency Request Status Selected? Address Phone Number Fax Number    [facility] Pending - No Request Sent     [address] [place] [address] [phone]                Demographic Summary       01/08/18 1019    Referral Information    Referral Source nursing    Reason For Consult --   SS received consult from nursing home. Pt was admitted from [facility].      Lu Mayo

## 2018-01-08 NOTE — NURSING NOTE
Full thickness skin loss (stage 3 pressure injury) noted to coccyx  Red, moist, 0.9 x 0.9 x 2 cm noted  Treatment ordered  Also blisters to left hand  Clear fluid filled on 1st, 2nd, & 3rd fingers  Etiology unknown to me  Will follow and treat as needed if blisters rupture

## 2018-01-08 NOTE — CONSULTS
Patient has 2 working peripheral IV's at this time. I placed an 18 gauge acucath in the right forearm x1 stick. 5cc blood drawn for lab without difficulty. Nurses instructed that this IV can be used for lab draws and can remain in place for 29 days if needed.

## 2018-01-08 NOTE — PLAN OF CARE
Problem: Patient Care Overview (Adult)  Goal: Plan of Care Review  Outcome: Ongoing (interventions implemented as appropriate)   01/08/18 0240   Coping/Psychosocial Response Interventions   Plan Of Care Reviewed With patient   Patient Care Overview   Progress progress toward functional goals as expected     Goal: Adult Individualization and Mutuality  Outcome: Ongoing (interventions implemented as appropriate)      Problem: Skin Integrity Impairment, Risk/Actual (Adult)  Goal: Identify Related Risk Factors and Signs and Symptoms  Outcome: Ongoing (interventions implemented as appropriate)   01/08/18 0240   Skin Integrity Impairment, Risk/Actual   Skin Integrity Impairment, Risk/Actual: Related Risk Factors age extremes;cognitive impairment;infection/disease process;immobility;fluid/nutrition status;sensory impairment     Goal: Skin Integrity/Wound Healing  Outcome: Ongoing (interventions implemented as appropriate)   01/08/18 0240   Skin Integrity Impairment, Risk/Actual (Adult)   Skin Integrity/Wound Healing making progress toward outcome       Problem: Sepsis (Adult)  Goal: Signs and Symptoms of Listed Potential Problems Will be Absent or Manageable (Sepsis)  Outcome: Ongoing (interventions implemented as appropriate)   01/08/18 0240   Sepsis   Problems Assessed (Sepsis) all   Problems Present (Sepsis) progression of infection       Problem: Fall Risk (Adult)  Goal: Identify Related Risk Factors and Signs and Symptoms  Outcome: Ongoing (interventions implemented as appropriate)   01/08/18 0240   Fall Risk   Fall Risk: Related Risk Factors age-related changes;bladder function altered;confusion/agitation;gait/mobility problems;fatigue/slow reaction;neuro disease/injury;environment unfamiliar   Fall Risk: Signs and Symptoms presence of risk factors     Goal: Absence of Falls  Outcome: Ongoing (interventions implemented as appropriate)   01/08/18 0240   Fall Risk (Adult)   Absence of Falls making progress toward  outcome       Problem: Dysphagia (Adult)  Goal: Identify Related Risk Factors and Signs and Symptoms  Outcome: Ongoing (interventions implemented as appropriate)   01/08/18 0240   Dysphagia   Dysphagia: Related Risk Factors functional decline;mental status altered     Goal: Functional/Safe Swallow  Outcome: Ongoing (interventions implemented as appropriate)   01/08/18 0240   Dysphagia (Adult)   Functional/Safe Swallow making progress toward outcome     Goal: Compensatory Techniques to Improve Safety/Function with Swallowing  Outcome: Ongoing (interventions implemented as appropriate)   01/08/18 0240   Dysphagia (Adult)   Compensatory Techniques to Improve Safety/Function with Swallowing making progress toward outcome       Problem: Anxiety (Adult)  Goal: Identify Related Risk Factors and Signs and Symptoms  Outcome: Ongoing (interventions implemented as appropriate)   01/08/18 0240   Anxiety   Related Risk Factors (Anxiety) cognitive status;knowledge deficit;environment change   Signs and Symptoms (Anxiety) body tremors/twitching/restless legs     Goal: Reduction/Resolution  Outcome: Ongoing (interventions implemented as appropriate)   01/08/18 0240   Anxiety (Adult)   Reduction/Resolution making progress toward outcome       Problem: Infection, Risk/Actual (Adult)  Goal: Identify Related Risk Factors and Signs and Symptoms  Outcome: Ongoing (interventions implemented as appropriate)   01/08/18 0240   Infection, Risk/Actual   Infection, Risk/Actual: Related Risk Factors age extremes;chronic illness/condition;exposure to microbes;malnutrition;skin integrity impairment   Signs and Symptoms (Infection, Risk/Actual) malaise;weakness;respiratory rate increase     Goal: Infection Prevention/Resolution  Outcome: Ongoing (interventions implemented as appropriate)   01/08/18 0240   Infection, Risk/Actual (Adult)   Infection Prevention/Resolution making progress toward outcome

## 2018-01-08 NOTE — PROGRESS NOTES
"  I have seen the patient in conjunction with Josephine JOLLEY      History of presenting illness:  Patient cannot give a history due to her medical condition.  I did discuss with her daughter over the phone.  Confirmed DNR.  Patient apparently has been doing better and able to eat some of the nursing home, there had been a feeding tube discussion with this was not done because she was \"getting better\".  She has underlying Alzheimer's and has had this since age 50.  She is nonverbal.  This is at baseline.    Vital Signs  Temp:  [98.7 °F (37.1 °C)-101.8 °F (38.8 °C)] 98.7 °F (37.1 °C)  Heart Rate:  [] 84  Resp:  [16-32] 24  BP: ()/(58-90) 139/70  Body mass index is 14.11 kg/(m^2).    No intake or output data in the 24 hours ending 01/08/18 1157  Intake & Output (last 3 days)       01/05 0701 - 01/06 0700 01/06 0701 - 01/07 0700 01/07 0701 - 01/08 0700 01/08 0701 - 01/09 0700            Unmeasured Urine Occurrence   1 x     Unmeasured Stool Occurrence   0 x           Physical exam:  Physical Exam   Constitutional: Well-developed, Thin female, she appears to be having some myoclonic jerks at present, sometimes these will occur and sometimes he stopped.  Head: Normocephalic and atraumatic.  Cannot really  hearing, mucous membranes appear dry.  Eyes:  Pupils are equal, round, there is a questionable nystagmus lateral  Cardiovascular: Normal rate, regular rhythm and normal heart sounds.  No murmur rub or gallop  Pulmonary/Chest: Bilateral breath sounds diminished sounds, crackles right base  Abdominal: Soft, flat, bowel sounds are active, nondistended nontender.  No peritoneal signs   Musculoskeletal: Cannot test strength, she has significant muscle wasting intraosseous  in her hands  Neurological: Not responsive to any stimuli  Skin: Skin is warm and dry.   Psychiatric:  Cannot test mood    EKG: EKG has much artifact, cannot totally rule out ST changes that could be ischemia however troponins negative    Results " Review:  Lab Results   Component Value Date    WBC 17.12 (H) 01/08/2018    HGB 13.2 01/08/2018    HCT 43.8 01/08/2018    MCV 96.1 (H) 01/08/2018     01/08/2018     Lab Results   Component Value Date    GLUCOSE 154 (H) 01/08/2018    BUN 15 01/08/2018    CREATININE 0.63 01/08/2018    EGFRIFNONA 96 01/08/2018    EGFRIFAFRI 121 10/07/2016    BCR 23.8 01/08/2018    CO2 21.7 (L) 01/08/2018    CALCIUM 7.8 01/08/2018    ALBUMIN 3.20 (L) 01/08/2018    LABIL2 1.0 (L) 01/08/2018    AST 29 01/08/2018    ALT 35 01/08/2018       Imaging Results (last 72 hours)     Procedure Component Value Units Date/Time    XR Chest 1 View [[id number]] Collected:  01/07/18 2355     Updated:  01/07/18 2357    Narrative:       EXAMINATION: XR CHEST 1 VW-      CLINICAL INDICATION:     congestion, soa     TECHNIQUE:  XR CHEST 1 VW-      COMPARISON: NONE      FINDINGS:   Patchy right infrahilar opacities.   Heart and mediastinal contours are unremarkable.   No pneumothorax.   No pleural effusion.   No acute osseous findings.            Impression:       Right infrahilar airspace disease.     This report was finalized on 1/7/2018 11:55 PM by Dr. Rubén Vickers MD.            Chest x-ray image reviewed        Assessment/Plan     Active Problems:  Severe sepsis with hypoxic respiratory failure superimposed on right pneumonia probable aspiration.  Patient is on Zosyn and vancomycin.  Prognosis is poor.  Patient is on nonrebreather, will consider change to high flow once ABG returned.  I have asked pulmonology for consult as well    Myoclonic movements, stat EEG ordered.    Hypothyroidism, check TSH, Synthroid change to IV if she cannot take by mouth at this time.    Nutrition, to point to the daughter that I do not think that patient will be able to take by mouth, may have to consider NG tube    DNR status confirmed with daughter    Alzheimer's disease, advanced diagnosis prior to this admission    Hypernatremia, I've changed her to D5, one fourth  normal saline, will follow serial sodiums.  Adjust as needed. Nephrology consulted    Hypokalemia, supplement per protocol.    Hypophosphatemia, receiving supplementation.      Wil Felix MD  01/08/18  11:57 AM

## 2018-01-08 NOTE — PLAN OF CARE
Problem: Infection, Risk/Actual (Adult)  Goal: Identify Related Risk Factors and Signs and Symptoms   01/08/18 0240   Infection, Risk/Actual   Infection, Risk/Actual: Related Risk Factors age extremes;chronic illness/condition;exposure to microbes;malnutrition;skin integrity impairment   Signs and Symptoms (Infection, Risk/Actual) malaise;weakness;respiratory rate increase      01/08/18 0240   Infection, Risk/Actual   Infection, Risk/Actual: Related Risk Factors age extremes;chronic illness/condition;exposure to microbes;malnutrition;skin integrity impairment   Signs and Symptoms (Infection, Risk/Actual) malaise;weakness;respiratory rate increase      01/08/18 0240   Infection, Risk/Actual   Infection, Risk/Actual: Related Risk Factors age extremes;chronic illness/condition;exposure to microbes;malnutrition;skin integrity impairment   Signs and Symptoms (Infection, Risk/Actual) malaise;weakness;respiratory rate increase      01/08/18 0240   Infection, Risk/Actual   Infection, Risk/Actual: Related Risk Factors age extremes;chronic illness/condition;exposure to microbes;malnutrition;skin integrity impairment   Signs and Symptoms (Infection, Risk/Actual) malaise;weakness;respiratory rate increase     Goal: Infection Prevention/Resolution   01/08/18 0240   Infection, Risk/Actual (Adult)   Infection Prevention/Resolution making progress toward outcome

## 2018-01-08 NOTE — CONSULTS
Consult for:  Abnormal EKG    Chief Complaint: Choking on her food    History of Present Illness:  GERALD Zendejas is a 62-year-old female who was admitted on 1/7/17 with aspiration pneumonia.  She is a resident at [facility] since 4/10/2015.  She presented to the ER via ambulance for choking on her food.  She is on a puréed diet.  She is nonverbal due to dementia.  History is taken from the patient's chart and from  and daughter at bedside.  She had an EKG today which showed some evidence of ischemia, T wave abnormalities and prolonged QT.    Review of Systems: 14 point review of systems otherwise negative except as stated in the history of present illness    Past Medical History: Alzheimer's disease, hypertension, vitamin D deficiency, hypothyroidism, anxiety, dysphasia, muscle spasms.    Medications:       Current Facility-Administered Medications:   •  aspirin chewable tablet 81 mg, 81 mg, Oral, Daily, Wil Felix MD  •  dextrose 5 % and sodium chloride 0.2 % infusion, 80 mL/hr, Intravenous, Continuous, Rafi Kidd MD, Last Rate: 80 mL/hr at 01/08/18 1604, 80 mL/hr at 01/08/18 1604  •  ipratropium-albuterol (DUO-NEB) nebulizer solution 3 mL, 3 mL, Nebulization, Q6H PRN, Tammy Bueno MD, 3 mL at 01/08/18 0707  •  levothyroxine sodium injection 50 mcg, 50 mcg, Intravenous, Daily, Wil Felix MD, 50 mcg at 01/08/18 1556  •  Magnesium Sulfate 2 gram Bolus, followed by 8 gram infusion (total Mg dose 10 grams)- Mg less than or equal to 1mg/dL, 2 g, Intravenous, PRN **OR** Magnesium Sulfate 6 gram Infusion (2 gm x 3) -Mg 1.1 -1.5 mg/dL, 2 g, Intravenous, PRN **OR** magnesium sulfate 4 gram infusion- Mg 1.6-1.9 mg/dL, 4 g, Intravenous, PRN, Alba Johnson PA-C  •  piperacillin-tazobactam (ZOSYN) 3.375 g/100 mL 0.9% NS IVPB (mbp), 3.375 g, Intravenous, Q8H, Sakina Olivo, [facility], 3.375 g at 01/08/18 1516  •  potassium chloride (MICRO-K) CR capsule 40 mEq, 40 mEq, Oral, PRN **OR**  potassium chloride (KLOR-CON) packet 40 mEq, 40 mEq, Oral, PRN **OR** potassium chloride 10 mEq in 100 mL IVPB, 10 mEq, Intravenous, Q1H PRN, Alba Johnson PA-C  •  potassium chloride 10 mEq in 100 mL IVPB, 10 mEq, Intravenous, Q1H, Rand Benavidez, RPH  •  potassium phosphate 45 mmol in sodium chloride 0.9 % 500 mL infusion, 45 mmol, Intravenous, PRN **OR** potassium phosphate 30 mmol in sodium chloride 0.9 % 250 mL infusion, 30 mmol, Intravenous, PRN **OR** potassium phosphate 15 mmol in sodium chloride 0.9 % 100 mL infusion, 15 mmol, Intravenous, PRN **OR** sodium phosphates 45 mmol in sodium chloride 0.9 % 500 mL IVPB, 45 mmol, Intravenous, PRN **OR** sodium phosphates 30 mmol in sodium chloride 0.9 % 250 mL IVPB, 30 mmol, Intravenous, PRN **OR** sodium phosphates 15 mmol in sodium chloride 0.9 % 250 mL IVPB, 15 mmol, Intravenous, PRN, Alba Johnson PA-C  •  sodium chloride 0.9 % flush 1-10 mL, 1-10 mL, Intravenous, PRN, Tammy Bueno MD  •  Insert peripheral IV, , , Once **AND** sodium chloride 0.9 % flush 10 mL, 10 mL, Intravenous, PRN, Mague Herman, APRN  •  vancomycin (VANCOCIN) 750 mg in sodium chloride 0.9 % 250 mL IVPB, 750 mg, Intravenous, Q18H, Rand Benavidez, [facility]      Allergies: Aricept    Social History: Lives at [place]     Physical Exam   Constitutional: Vital signs are normal. She appears cachectic.   HENT:   Head: Normocephalic and atraumatic.   Eyes: Conjunctivae and lids are normal. Pupils are equal, round, and reactive to light.   Neck: No JVD present. Carotid bruit is not present.   Cardiovascular: Normal rate, regular rhythm, normal heart sounds and intact distal pulses.  PMI is not displaced.    Pulses:       Dorsalis pedis pulses are 2+ on the right side, and 2+ on the left side.        Posterior tibial pulses are 2+ on the right side, and 2+ on the left side.   No lower extremity edema   Pulmonary/Chest: Effort normal and breath sounds normal. No  "respiratory distress.   Abdominal: Soft. Normal appearance. There is no tenderness.   Neurological: She is alert.   Skin: Skin is warm and dry.   Psychiatric: She has a normal mood and affect. Cognition and memory are normal.   Vitals reviewed.       Patient Vitals for the past 24 hrs:   BP Temp Temp src Pulse Resp SpO2 Height Weight   01/08/18 1423 158/53 100 °F (37.8 °C) Axillary (!) 46 24 - - -   01/08/18 0718 139/70 98.7 °F (37.1 °C) Axillary 84 24 - - -   01/08/18 0716 - - - - - 97 % - -   01/08/18 0707 - - - 51 24 97 % - -   01/08/18 0535 133/90 100.2 °F (37.9 °C) Axillary 94 22 98 % - -   01/08/18 0300 - - - (!) 43 16 95 % - -   01/08/18 0130 125/61 100.5 °F (38.1 °C) Axillary 89 24 96 % 180.3 cm (71\") 45.9 kg (101 lb 3.2 oz)   01/08/18 0115 108/60 - - 97 26 98 % - -   01/08/18 0100 106/60 100 °F (37.8 °C) Rectal 72 26 100 % - -   01/08/18 0045 108/61 - - 73 26 - - -   01/08/18 0000 108/61 - - 93 26 94 % - -   01/07/18 2344 104/58 - - 90 26 90 % - -   01/07/18 2329 100/62 100.2 °F (37.9 °C) Rectal 101 26 96 % - -   01/07/18 2309 - - - - - 91 % - -   01/07/18 2257 - - - 89 26 - - -   01/07/18 2147 114/70 - - 95 - (!) 86 % - -   01/07/18 2132 120/69 - - 98 - (!) 82 % - -   01/07/18 2130 - - - - - (!) 85 % - -   01/07/18 2117 - - - 110 (!) 30 (!) 84 % - -   01/07/18 2102 96/67 - - 109 - (!) 83 % - -   01/07/18 2059 104/68 - - 109 - (!) 85 % - -   01/07/18 2054 - (!) 101.8 °F (38.8 °C) Rectal - - - - -   01/07/18 2050 120/71 - - 115 (!) 32 (!) 79 % 180.3 cm (71\") 45.8 kg (101 lb)         Results from last 7 days  Lab Units 01/08/18  0231 01/07/18 2119   WBC 10*3/mm3 17.12* 19.68*  21.23*   HEMOGLOBIN g/dL 13.2 15.6  15.2   PLATELETS 10*3/mm3 208 283  296       Results from last 7 days  Lab Units 01/08/18  0231 01/07/18 2119   SODIUM mmol/L 161* 162*   POTASSIUM mmol/L 3.3* 3.7   CHLORIDE mmol/L 131* 126*   CO2 mmol/L 21.7* 24.2*   BUN mg/dL 15 20   CREATININE mg/dL 0.63 0.81   CALCIUM mg/dL 7.8 9.0   GLUCOSE " mg/dL 154* 180*       Results from last 7 days  Lab Units 01/08/18  1317 01/07/18  2326 01/07/18  2119   CK TOTAL U/L 317*  --   --    TROPONIN I ng/mL 0.018 0.032 0.023   CKMB ng/mL 0.77  --   --      Lab Results   Component Value Date    BNP 28.0 10/07/2016             Impression and Plan:     1) Prolonged QT interval      Avoid medications that can prolong QT.     Correct hypokalemia.  Serum Ca and Mag normal today.    Continue telemetry, monitor EKG    2) T wave abnormality     Agree with adding aspirin 81 mg   Discussed both stress and cardiac cath with patient's family, they would prefer medical management for any possible coronary artery disease.  Agreeable to echocardiogram.    Thank you for this consult.  We will follow.    Sunshine Grider, JOANA    Addendum:  Agree with above. History reviewed. Patient examined. PT with hypernatremia that may contribute to diffuse EKG changes. Recommend increase free water in tube feeds and reassess EKG as sodium levels begin to normalize. Thank you. No further recs. Please call with questions.

## 2018-01-08 NOTE — ED PROVIDER NOTES
Subjective   Patient is a 62 y.o. female presenting with shortness of breath.   History provided by:  Nursing home and EMS personnel   used: No    Shortness of Breath   Severity:  Moderate  Onset quality:  Gradual  Duration:  2 days  Timing:  Intermittent  Progression:  Worsening  Chronicity:  New  Context comment:  Possible aspiration at nursing home  Relieved by:  Nothing  Worsened by:  Nothing  Ineffective treatments:  None tried  Associated symptoms: cough, sputum production and wheezing    Associated symptoms: no abdominal pain, no chest pain and no fever    Risk factors comment:  Nursing home patient      Review of Systems   Constitutional: Negative.  Negative for fever.   HENT: Negative.    Respiratory: Positive for cough, sputum production, shortness of breath and wheezing.    Cardiovascular: Negative.  Negative for chest pain.   Gastrointestinal: Negative.  Negative for abdominal pain.   Endocrine: Negative.    Genitourinary: Negative.  Negative for dysuria.   Skin: Negative.    Neurological: Negative.    Psychiatric/Behavioral: Negative.    All other systems reviewed and are negative.      Past Medical History:   Diagnosis Date   • Alzheimer disease    • Anxiety    • Dementia    • Hypothyroid        Allergies   Allergen Reactions   • Aricept [Donepezil Hcl]        History reviewed. No pertinent surgical history.    History reviewed. No pertinent family history.    Social History     Social History   • Marital status:      Spouse name: N/A   • Number of children: N/A   • Years of education: N/A     Social History Main Topics   • Smoking status: Never Smoker   • Smokeless tobacco: Never Used   • Alcohol use No   • Drug use: No   • Sexual activity: Defer     Other Topics Concern   • None     Social History Narrative   • None           Objective   Physical Exam   Constitutional:   Appears thin and frail. Appears chronically ill   HENT:   Head: Normocephalic and atraumatic.   Eyes:  Pupils are equal, round, and reactive to light.   Neck: Normal range of motion. Neck supple.   Cardiovascular: Regular rhythm, normal heart sounds and intact distal pulses.    tachycardia   Pulmonary/Chest: She is in respiratory distress. She has wheezes. She has rales.   Moderate respiratory distress, rales and rhonchi throughout   Abdominal: Soft. Bowel sounds are normal.   Neurological: She is alert.   nonverbal   Skin: Skin is warm and dry.   Psychiatric:   Responds to painful stimuli. Eyes track. Family reports dementia at baseline   Nursing note and vitals reviewed.      Procedures         ED Course  ED Course   Value Comment By Time   XR Chest 1 View Right lower lobe pneumonia, reviewed by Dr. Letitia Herman, APRN 01/07 2208   ECG 12 Lead Sinus tachycardia rate 97 with frequent PVCs.ST-T wave abnormality. No acute ischemia. Reviewed by Dr. Dong at 2132 Mague Herman, APRN 01/07 2209    I spoke with family about aggressiveness of treatment and if they wanted a central line and pressors if needed. The family does not fully understand. I discussed with Dr. Bueno who will see patient in ED and discuss care with family. Mague Herman, APRN 01/07 2356    Family verbalizes to me and Brenda RN that patient is DNR and they do not want central line or pressors at this time Mague Herman, APRN 01/08 0052                  MDM  Number of Diagnoses or Management Options  Aspiration pneumonia of right lower lobe, unspecified aspiration pneumonia type: new and requires workup  Sepsis, due to unspecified organism: new and requires workup     Amount and/or Complexity of Data Reviewed  Clinical lab tests: ordered and reviewed  Tests in the radiology section of CPT®: reviewed and ordered  Tests in the medicine section of CPT®: reviewed and ordered    Risk of Complications, Morbidity, and/or Mortality  Presenting problems: moderate  Diagnostic procedures:  moderate  Management options: moderate    Patient Progress  Patient progress: improved      Final diagnoses:   Aspiration pneumonia of right lower lobe, unspecified aspiration pneumonia type   Sepsis, due to unspecified organism            Mague Herman, JOANA  01/08/18 0051       Mague Herman, APRNELLY  01/08/18 0052

## 2018-01-08 NOTE — CONSULTS
Referring Provider: Dr. Hoff  Reason for Consultation: aspiration pneumonia      Chief complaint shortness of breath      History of present illness:  Ms. Eden is a 62 year old female that has a history of dementia.  She is a resident of [facility].  She is bedridden and nonverbal due to dementia.  History was provided by her  and daughter.  She has been eating a purred diet and was choking on her food.  Brought to the emergency department for evaluation.  The patient is a DNR and the family does not desire any aggressive treatment other than antibiotics and IV fluids.  Chest x-ray in the emergency department showed a right lower lobe infiltrate.  She was admitted to the [facility] floor for further evaluation and treatment.    Review Of Systems:  Review of Systems - History obtained from unobtainable from patient due to mental status        History  Past Medical History:   Diagnosis Date   • Alzheimer disease    • Anxiety    • Dementia    • Dermatopolymyositis    • Dysphagia    • Hypertension    • Hypothyroid    • Malnutrition    • Muscle spasm    • Quadriparesis    • Vitamin D deficiency    , History reviewed. No pertinent surgical history., Family History   Problem Relation Age of Onset   • Family history unknown: Yes   , Social History   Substance Use Topics   • Smoking status: Never Smoker   • Smokeless tobacco: Never Used   • Alcohol use No   , Prescriptions Prior to Admission   Medication Sig Dispense Refill Last Dose   • baclofen (LIORESAL) 10 MG tablet Take 5 mg by mouth 2 (Two) Times a Day.   1/7/2018 at 1600   • cholecalciferol (VITAMIN D3) 1000 units tablet Take 1,000 Units by mouth Daily.   1/7/2018 at 0800   • diphenhydrAMINE (BENADRYL) 25 mg capsule Take 25 mg by mouth every night at bedtime.   1/6/2018 at PM   • levothyroxine (SYNTHROID, LEVOTHROID) 125 MCG tablet Take 125 mcg by mouth Daily.   1/7/2018 at 0800   • multivitamin (DAILY BLACK) tablet tablet Take 1 tablet by mouth  Daily.   1/7/2018 at 0800   • ramipril (ALTACE) 5 MG capsule Take 5 mg by mouth Daily.   1/7/2018 at 0800   • senna (SENOKOT) 8.6 MG tablet tablet Take 1 tablet by mouth Daily.   1/7/2018 at 0800   • traMADol (ULTRAM) 50 MG tablet Take 50 mg by mouth 3 (Three) Times a Day.   1/7/2018 at 1400   , Scheduled Meds:    levothyroxine sodium 50 mcg Intravenous Daily   piperacillin-tazobactam 3.375 g Intravenous Q8H   potassium chloride 10 mEq Intravenous Q1H   potassium phosphate 15 mmol Intravenous Once   vancomycin 750 mg Intravenous Q18H   , Continuous Infusions:    dextrose 5 % and sodium chloride 0.2 % 100 mL/hr Last Rate: 100 mL/hr (01/08/18 1227)    and Allergies:  Aricept [donepezil hcl]    Objective     Vital Signs   Temp:  [98.7 °F (37.1 °C)-101.8 °F (38.8 °C)] 100 °F (37.8 °C)  Heart Rate:  [] 46  Resp:  [16-32] 24  BP: ()/(53-90) 158/53    Physical Exam:               GENERAL APPEARANCE: frail, chronically ill appearing    HEAD: normocephalic.    EYES: PERRL, EOMI. Fundi normal, vision is grossly intact.    THROAT: Oral cavity and pharynx normal. No inflammation, swelling, exudate, or lesions.     NECK: Neck supple.     CARDIAC: Normal S1 and S2. No S3, S4 or murmurs. Rhythm is regular. There is no peripheral edema, cyanosis or pallor. Extremities are warm and well perfused. Capillary refill is less than 2 seconds. No carotid bruits.    RESPIRATORY: Clear to auscultation without rales, rhonchi, wheezing or diminished breath sounds.    GI: Positive bowel sounds. Soft, nondistended, nontender.     MUSCULOSKELETAL: No significant deformity or joint abnormality. No edema. Peripheral pulses intact. No varicosities.    NEUROLOGICAL:Unable to assess due to mental status.    PSYCHIATRIC: Unable to assess due to mental status.               Results Review:    LABS:    Lab Results   Component Value Date    GLUCOSE 154 (H) 01/08/2018    BUN 15 01/08/2018    CREATININE 0.63 01/08/2018    EGFRIFNONA 96  01/08/2018    EGFRIFAFRI 121 10/07/2016    BCR 23.8 01/08/2018    CO2 21.7 (L) 01/08/2018    CALCIUM 7.8 01/08/2018    ALBUMIN 3.20 (L) 01/08/2018    LABIL2 1.0 (L) 01/08/2018    AST 29 01/08/2018    ALT 35 01/08/2018    WBC 17.12 (H) 01/08/2018    HGB 13.2 01/08/2018    HCT 43.8 01/08/2018    MCV 96.1 (H) 01/08/2018     01/08/2018     (C) 01/08/2018    K 3.3 (L) 01/08/2018     (H) 01/08/2018    ANIONGAP 8.3 01/08/2018       No results found for: INR, PROTIME                        I reviewed the patient's new clinical results.  I reviewed the patient's new imaging results and agree with the interpretation.      Assessment/Plan     Pneumonia:  Likely related to aspiration.  ABG reviewed.    Chest xray shows right sided consolidation.  Is most likely due to aspiration.    Will place patient on high flow oxygen for comfort.  Maintain oxygen saturation >94%.    WBC 17.12, neutrophils are 83.  Continue IV antibiotics.    Hypernatremia:  Level 161.  Continue IV fluids.    Hypokalemia:  Level 3.3.  Replaced, continue to monitor.    Spoke with the  and daughter in detail about CODE STATUS and wished for the patient's plan of care.  Patient is a DNR/DNI.    Palliative care is following the patient.      Active Problems:    Aspiration pneumonia of right lower lobe          I discussed the patients findings and my recommendations with family, nursing staff and consulting provider    JOANA Jeff  01/08/18  2:39 PM      Attestation: Scribed for Dr. Lantigua by JOANA Shaw        I, Jef Lantigua M.D. attest that the above note accurately reflects the work and decisions made  by me.  Patient was seen and evaluated by Dr. Lantigua, including history of present illness, physical exam, assessment, and treatment plan.  The above note was reviewed and edited by Dr. Lantigua.

## 2018-01-08 NOTE — PROGRESS NOTES
Discharge Planning Assessment  WILL Alfaro     Patient Name: Baldo Eden  MRN: [id number]  Today's Date: 1/8/2018    Admit Date: 1/7/2018          Discharge Needs Assessment       01/08/18 1020    Discharge Needs Assessment    Discharge Disposition skilled nursing facility   SS notified [facility] per [place] who states pt has a 14 day skilled bed hold.             Discharge Plan       01/08/18 1020    Case Management/Social Work Plan    Plan Pt was admitted from [facility] and has a 14 day skilled bed hold. SS to follow and assist as needed with discharge planning.         Discharge Placement     Facility/Agency Request Status Selected? Address Phone Number Fax Number    [facility] Pending - No Request Sent     [id number] [place] [id number] [phone]                Demographic Summary       01/08/18 1019    Referral Information    Referral Source nursing    Reason For Consult --   SS received consult from nursing home. Pt was admitted from [facility].    Lu Mayo

## 2018-01-08 NOTE — PROGRESS NOTES
EKG shows sinus rhythm with bigeminy to my viewing possibly some anterior lateral ischemia, possibly inferior, and her current status do not think should be an interventional candidate, her enzymes are negative and I have placed her on aspirin today but I have asked cardiology for their input.

## 2018-01-08 NOTE — PROGRESS NOTES
Pharmacokinetics Service Note:    Ms. Eden has been evaluated for vancomycin dosing to treat her R sided aspiration pneumonia.  She received a 1 gm dose around 2230 last PM.  Based on her demographics and estimated ClCr of around 57 mL/min, will continue dosing at 750 mg q18hrs to target troughs = 15-20 mg/L.  Will obtain a trough level when steady state is achieved to determine if any adjustments are needed.    Thank you.  Rand Benavidez, Pharm.D.  1/8/2018  8:00 AM

## 2018-01-08 NOTE — PROGRESS NOTES
Malnutrition Severity Assessment    Patient Name:  Baldo Eden  YOB: 1955  MRN: [id number]  Admit Date:  1/7/2018    Patient meets criteria for : Severe malnutrition    Comments:  If MD agrees with findings please cosign and document.     Malnutrition Type: Chronic Illness Malnutrition     Malnutrition Type (last 8 hours)      Malnutrition Severity Assessment       01/08/18 1444    Malnutrition Severity Assessment    Malnutrition Type Chronic Illness Malnutrition      01/08/18 1444    Physical Signs of Malnutrition (Chronic)    Muscle Wasting --   moderate    Fat Loss --   moderate    Fluid Accumulation None    Secondary Physical Signs Present (comment)   pressure ulcer      01/08/18 1444    Weight Status (Chronic)    BMI Severe (<16)    %IBW Severe (<70%)   65% IBW      01/08/18 1444    Energy Intake Status (Chronic)    Energy Intake Severe (< or equal to 50% / > or equal to 1 mo)      01/08/18 1444    Criteria Met (Must meet criteria for severity in at least 2 of these categories: M Wasting, Fat Loss, Fluid, Secondary Signs, Wt. Status, Intake)    Patient meets criteria for  Severe malnutrition          Electronically signed by:  Gretta Cuevas RD  01/08/18 2:49 PM

## 2018-01-08 NOTE — PROGRESS NOTES
Nutrition Services    Patient Name:  Baldo Eden  YOB: 1955  MRN: [id number]  Admit Date:  1/7/2018    Consult received for TF assessment.  Noted NG tube placed.  Pt may be at risk for refeeding syndrome.  Needs assessed 15-20 kcal / kg.  Rec TF at this time of Jevity 1.2 @ 30 ml/hr goal rate.  Will follow and monitor and make interventions further.     Electronically signed by:  Gretta Cuevas RD  01/08/18 2:50 PM

## 2018-01-08 NOTE — CONSULTS
Nephrology Consult Note    Referring Provider: Dr Felix  Reason for Consultation: hypernatremia    Subjective       History of present illness:  Baldo Eden is a 62 y.o. female who was brought from NH  to [facility] emergency department with chief complaint of choking on food. Her daughter at bedside who is a RN is providing the history. Patient has alzheimer dementia diagnosed at age of 50 yrs and she has been in NH for 3 yrs. She is on pureed diet and apparently choked on it. She did have fever at NH. She is non verbal and doesn't provide any history this is her baseline per family.   Her sodium was 162 on 1/7/18 and is 161 today. Creatinine is normal at 0.6.   She is getting d5w1/4 and K phosphate per protocol    History  Past Medical History:   Diagnosis Date   • Alzheimer disease    • Anxiety    • Dementia    • Dermatopolymyositis    • Dysphagia    • Hypertension    • Hypothyroid    • Malnutrition    • Muscle spasm    • Quadriparesis    • Vitamin D deficiency    , History reviewed. No pertinent surgical history., Family History   Problem Relation Age of Onset   • Family history unknown: Yes   , Social History   Substance Use Topics   • Smoking status: Never Smoker   • Smokeless tobacco: Never Used   • Alcohol use No   , Prescriptions Prior to Admission   Medication Sig Dispense Refill Last Dose   • baclofen (LIORESAL) 10 MG tablet Take 5 mg by mouth 2 (Two) Times a Day.   1/7/2018 at 1600   • cholecalciferol (VITAMIN D3) 1000 units tablet Take 1,000 Units by mouth Daily.   1/7/2018 at 0800   • diphenhydrAMINE (BENADRYL) 25 mg capsule Take 25 mg by mouth every night at bedtime.   1/6/2018 at PM   • levothyroxine (SYNTHROID, LEVOTHROID) 125 MCG tablet Take 125 mcg by mouth Daily.   1/7/2018 at 0800   • multivitamin (DAILY BLACK) tablet tablet Take 1 tablet by mouth Daily.   1/7/2018 at 0800   • ramipril (ALTACE) 5 MG capsule Take 5 mg by mouth Daily.   1/7/2018 at 0800   • senna (SENOKOT) 8.6 MG  tablet tablet Take 1 tablet by mouth Daily.   1/7/2018 at 0800   • traMADol (ULTRAM) 50 MG tablet Take 50 mg by mouth 3 (Three) Times a Day.   1/7/2018 at 1400   , Scheduled Meds:    aspirin 81 mg Oral Daily   levothyroxine sodium 50 mcg Intravenous Daily   piperacillin-tazobactam 3.375 g Intravenous Q8H   potassium chloride 10 mEq Intravenous Q1H   vancomycin 750 mg Intravenous Q18H   , Continuous Infusions:    dextrose 5 % and sodium chloride 0.2 % 90 mL/hr Last Rate: 100 mL/hr (01/08/18 1227)   , PRN Meds:  ipratropium-albuterol  •  magnesium sulfate **OR** magnesium sulfate **OR** magnesium sulfate  •  potassium chloride **OR** potassium chloride **OR** potassium chloride  •  potassium phosphate infusion greater than 15 mMoles **OR** potassium phosphate infusion greater than 15 mMoles **OR** potassium phosphate **OR** sodium phosphate IVPB **OR** sodium phosphate IVPB **OR** sodium phosphate IVPB  •  sodium chloride  •  Insert peripheral IV **AND** sodium chloride and Allergies:  Aricept [donepezil hcl]    Review of Systems  Unable to obtain    Objective     Vital Signs  Temp:  [98.7 °F (37.1 °C)-101.8 °F (38.8 °C)] 100 °F (37.8 °C)  Heart Rate:  [] 46  Resp:  [16-32] 24  BP: ()/(53-90) 158/53            Physical Examination:    General Appearance : non verbal, no distress, muscle wasting  Head : normocephalic, without obvious abnormality and atraumatic  Eyes : conjunctivae and sclerae normal, no icterus, no pallor and PERRLA  Throat : oral mucosa dry  Neck: no adenopathy, suppple, no carotid bruit and no JVD  Lungs : clear to auscultation, respirations regular and unlabored  Heart : regular rhythm & normal rate, normal S1, S2, no murmur, no viktoria, no rub   Abdomen :  normal bowel sounds, no masses and soft non-tender  Rectal : Deferred  Extremities :  no redness and no edema  Pulses :  palpable and equal bilaterally  Skin : no bleeding, bruising or rash  Neurologic : non verbal , myoclonic upper  extremity movements    Laboratory Data :      WBC WBC   Date Value Ref Range Status   01/08/2018 17.12 (H) 4.50 - 12.50 10*3/mm3 Final   01/07/2018 21.23 (H) 4.50 - 12.50 10*3/mm3 Final   01/07/2018 19.68 (H) 4.50 - 12.50 10*3/mm3 Final      HGB Hemoglobin   Date Value Ref Range Status   01/08/2018 13.2 12.0 - 16.0 g/dL Final   01/07/2018 15.2 12.0 - 16.0 g/dL Final   01/07/2018 15.6 12.0 - 16.0 g/dL Final      HCT Hematocrit   Date Value Ref Range Status   01/08/2018 43.8 37.0 - 47.0 % Final   01/07/2018 48.5 (H) 37.0 - 47.0 % Final   01/07/2018 49.5 (H) 37.0 - 47.0 % Final      Platlets No results found for: LABPLAT   MCV MCV   Date Value Ref Range Status   01/08/2018 96.1 (H) 80.0 - 94.0 fL Final   01/07/2018 93.8 80.0 - 94.0 fL Final   01/07/2018 93.9 80.0 - 94.0 fL Final          Sodium Sodium   Date Value Ref Range Status   01/08/2018 161 (C) 135 - 153 mmol/L Final   01/07/2018 162 (C) 135 - 153 mmol/L Final      Potassium Potassium   Date Value Ref Range Status   01/08/2018 3.3 (L) 3.5 - 5.3 mmol/L Final   01/07/2018 3.7 3.5 - 5.3 mmol/L Final      Chloride Chloride   Date Value Ref Range Status   01/08/2018 131 (H) 99 - 112 mmol/L Final   01/07/2018 126 (H) 99 - 112 mmol/L Final      CO2 CO2   Date Value Ref Range Status   01/08/2018 21.7 (L) 24.3 - 31.9 mmol/L Final   01/07/2018 24.2 (L) 24.3 - 31.9 mmol/L Final      BUN BUN   Date Value Ref Range Status   01/08/2018 15 7 - 21 mg/dL Final   01/07/2018 20 7 - 21 mg/dL Final      Creatinine Creatinine   Date Value Ref Range Status   01/08/2018 0.63 0.43 - 1.29 mg/dL Final   01/07/2018 0.81 0.43 - 1.29 mg/dL Final      Calcium Calcium   Date Value Ref Range Status   01/08/2018 7.8 7.7 - 10.0 mg/dL Final   01/07/2018 9.0 7.7 - 10.0 mg/dL Final      PO4 No results found for: CAPO4   Albumin Albumin   Date Value Ref Range Status   01/08/2018 3.20 (L) 3.40 - 4.80 g/dL Final   01/07/2018 3.80 3.40 - 4.80 g/dL Final      Magnesium Magnesium   Date Value Ref Range  Status   01/08/2018 2.2 1.7 - 2.6 mg/dL Final      Uric Acid No results found for: URICACID     Radiology results :     Imaging Results (last 72 hours)     Procedure Component Value Units Date/Time    XR Chest 1 View [[id number]] Collected:  01/07/18 2355     Updated:  01/07/18 2357    Narrative:       EXAMINATION: XR CHEST 1 VW-      CLINICAL INDICATION:     congestion, soa     TECHNIQUE:  XR CHEST 1 VW-      COMPARISON: NONE      FINDINGS:   Patchy right infrahilar opacities.   Heart and mediastinal contours are unremarkable.   No pneumothorax.   No pleural effusion.   No acute osseous findings.            Impression:       Right infrahilar airspace disease.     This report was finalized on 1/7/2018 11:55 PM by Dr. Rubén Vickers MD.               Medications:        aspirin 81 mg Oral Daily   levothyroxine sodium 50 mcg Intravenous Daily   piperacillin-tazobactam 3.375 g Intravenous Q8H   potassium chloride 10 mEq Intravenous Q1H   vancomycin 750 mg Intravenous Q18H       dextrose 5 % and sodium chloride 0.2 % 90 mL/hr Last Rate: 100 mL/hr (01/08/18 1227)       Assessment/Plan     Active Problems:    Aspiration pneumonia of right lower lobe      1. Hypernatremia : secondary to poor po intake. Free water deficit is 3.7 Kg. She has small muscle mass. Need to replace ongoing losses. I will reduce d51/4 NS to 80 cc/hr and continue to check Na q 8 hrs. If sodium < 155 reduce dextrose 1/4 NS to 60 cc/hr. Mix antibiotics in d5w. Check urine sodium and K. Do not correct > 6-8 meq in 24 hr period. D/w RN to continue IVF 24x7 and if needed a midline can be placed    2. Hypokalemia and hypophosphatemia : being replaced per protocol    3. alzheimer's dementai    4. Aspiration pneumonia    Prognosis is poor  Thanks Dr Felix for the consult. We will follow with you.  I discussed the patients findings and my recommendations with family, nursing staff and Dr Elvira Kidd MD  01/08/18  3:21 PM

## 2018-01-08 NOTE — H&P
[facility] HOSPITALIST HISTORY AND PHYSICAL    Patient Identification:  Name:  Baldo Eden  Age:  62 y.o.  Sex:  female  :  1955  MRN:  [id number]   Visit Number:  [id number]  Primary Care Physician:  Kashmir Garcia MD     Chief complaint:  Aspirated at the nursing home    History of presenting illness:  62 y.o. female, resident of [facility] since 4/10/2015, who presented to [facility] ED via ambulance for choking on food; she was eating a pureed diet.  The patient is nonverbal due to dementia and I was not able to obtain any history from her; no family is at bedside.  The patient is a DNR at the [facility] home; the family was asked by the ED staff and they do not desire any levophed or aggressive treatment other than antibiotics and fluids.  In the ED, CXR showed a right lower lobe infiltrate and she was septic.  Cultures were obtained and she was given zosyn and vancomycin and then placed on the med-surg floor.  ---------------------------------------------------------------------------------------------------------------------   Review of Systems   Unable to perform ROS: Dementia    ---------------------------------------------------------------------------------------------------------------------   Past Medical History:   Diagnosis Date   • Alzheimer disease    • Anxiety    • Dementia    • Dermatopolymyositis    • Dysphagia    • Hypertension    • Hypothyroid    • Malnutrition    • Muscle spasm    • Quadriparesis    • Vitamin D deficiency      History reviewed. No pertinent surgical history.     Family History   Problem Relation Age of Onset   • Family history unknown: Yes     Social History     Social History   • Marital status:      Social History Main Topics   • Smoking status: Never Smoker   • Smokeless tobacco: Never Used   • Alcohol use No   • Drug use: No   • Sexual activity: Defer    ---------------------------------------------------------------------------------------------------------------------   Allergies:  Aricept [donepezil hcl]  ---------------------------------------------------------------------------------------------------------------------   Prior to Admission Medications     Prescriptions Last Dose Informant Patient Reported? Taking?    baclofen (LIORESAL) 10 MG tablet 1/8/2018 Nursing Home Yes Yes    Take 5 mg by mouth 2 (Two) Times a Day.    cholecalciferol (VITAMIN D3) 1000 units tablet 1/8/2018 Nursing Home Yes Yes    Take 1,000 Units by mouth Daily.    diphenhydrAMINE (BENADRYL) 25 mg capsule 1/7/2018 Nursing Home Yes Yes    Take 25 mg by mouth every night at bedtime.    levothyroxine (SYNTHROID, LEVOTHROID) 125 MCG tablet 1/8/2018 Nursing Home Yes Yes    Take 125 mcg by mouth Daily.    multivitamin (DAILY BLACK) tablet tablet 1/8/2018 Nursing Home Yes Yes    Take 1 tablet by mouth Daily.    ramipril (ALTACE) 5 MG capsule 1/8/2018 Nursing Home Yes Yes    Take 5 mg by mouth Daily.    senna (SENOKOT) 8.6 MG tablet tablet 1/8/2018 Nursing Home Yes Yes    Take 1 tablet by mouth Daily.    traMADol (ULTRAM) 50 MG tablet 1/8/2018 Nursing Home Yes Yes    Take 50 mg by mouth 3 (Three) Times a Day.   ---------------------------------------------------------------------------------------------------------------------   Vital Signs:  Temp:  [100 °F (37.8 °C)-101.8 °F (38.8 °C)] 100 °F (37.8 °C)  Heart Rate:  [] 97  Resp:  [26-32] 26  BP: ()/(58-71) 108/60  Last 3 weights    01/07/18 2050   Weight: 45.8 kg (101 lb)     Body mass index is 14.09 kg/(m^2).  ---------------------------------------------------------------------------------------------------------------------   Physical Exam:  Constitutional:  Well-developed and well-nourished.  Severe respiratory distress.  Bilateral temporal muscle wasting.  HENT:  Head: Normocephalic and atraumatic.  Mouth:  Moist mucous  membranes.    Eyes:  Conjunctivae and EOM are normal.  Pupils are equal, round, and reactive to light.  No scleral icterus.  Neck:  Neck supple.  No JVD present.    Cardiovascular:  Normal rate, regular rhythm and normal heart sounds with no murmur.  Pulmonary/Chest:  Severe respiratory distress, no wheezes, crackles throughout and fair air movement.  Abdominal:  Soft.  Bowel sounds are normal.  No distension and no tenderness.   Musculoskeletal:  No edema, no tenderness, and no deformity.  No red or swollen joints anywhere.  Atrophy of muscles in the arms and legs and loss of fat on these extremities.  Neurological:  Obtunded and not able to follow commands; moves arms and legs but not on command.  Nonverbal.    Skin:  Skin is warm and dry.  No rash noted.  No pallor.   Peripheral vascular:  No edema and strong pulses on all 4 extremities.  Genitourinary:   No bazan catheter in place.  ---------------------------------------------------------------------------------------------------------------------  EKG:  NS with a heart rate of 97, QTc 566 ms  Telemetry:  Not ordered  I have personally looked at the EKG.  ---------------------------------------------------------------------------------------------------------------------  Results from last 7 days  Lab Units 01/07/18  2119   LACTATE mmol/L 2.7*   WBC 10*3/mm3 19.68*  21.23*   HEMOGLOBIN g/dL 15.6  15.2   HEMATOCRIT % 49.5*  48.5*   MCV fL 93.9  93.8   MCHC g/dL 31.5*  31.3*   PLATELETS 10*3/mm3 283  296     Results from last 7 days  Lab Units 01/07/18  2119   SODIUM mmol/L 162*   POTASSIUM mmol/L 3.7   CHLORIDE mmol/L 126*   CO2 mmol/L 24.2*   BUN mg/dL 20   CREATININE mg/dL 0.81   EGFR IF NONAFRICN AM mL/min/1.73 72   CALCIUM mg/dL 9.0   GLUCOSE mg/dL 180*   ALBUMIN g/dL 3.80   BILIRUBIN mg/dL 0.6   ALK PHOS U/L 82   AST (SGOT) U/L 35*   ALT (SGPT) U/L 42*   Estimated Creatinine Clearance: 52.1 mL/min (by C-G formula based on Cr of 0.81).    Results from  last 7 days  Lab Units 01/07/18  2326 01/07/18 2119   TROPONIN I ng/mL 0.032 0.023         I have personally looked at the labs and they are stated above.  ---------------------------------------------------------------------------------------------------------------------  Imaging Results (last 7 days)     Procedure Component Value Units Date/Time    XR Chest 1 View [[id number]] Collected:  01/07/18 2355     Updated:  01/07/18 2357    Narrative:       CLINICAL INDICATION:     congestion, soa  COMPARISON: NONE   FINDINGS:   Patchy right infrahilar opacities.   Heart and mediastinal contours are unremarkable.   No pneumothorax.   No pleural effusion.   No acute osseous findings.    Impression:       Right infrahilar airspace disease.  This report was finalized on 1/7/2018 11:55 PM by Dr. Rubén Vickers MD.      I have personally reviewed the radiology images and read the final radiology report.  ---------------------------------------------------------------------------------------------------------------------  Assessment and Plan:  -Sepsis and acute hypoxic respiratory failure due to right lower lobe pneumonia suspected to be due to aspiration  -Hypernatremia due to dehydration  -Liver enzyme elevation  -Possible UTI  -Functional quadriplegia  -History of dysphagia  -Essential hypertension  -Anxiety    Will start on zosyn and vancomycin and give IVF for the dehydration.  Will repeat in the morning to see if she is improving.  DNR is noted.  Will consult speech therapy; if it is determined that she needs a Gtube then we will talk to the family about this.  Will consult palliative care.    Tammy Bueno MD  01/08/18  1:46 AM

## 2018-01-08 NOTE — PROGRESS NOTES
"Discussed with family at length.   here who is POA.  Confirms DNR.  Patient did state however that if patient needed to be on the ventilator that this would be \"okay\".  We also discussed nutritional support and he is okay with NG tube feeds, I've explained that I may need to use her wrist restraints to keep her from pulling NG tube and aspirin he stated this would be okay.  Nutritional consult has been placed.  Discussed this case with Dr. Lantigua who is going to come up and see the patient as well.  Patient's ABG on nonrebreather was acceptable and I have changed her to high flow.  Since family states that she always has myoclonic jerks.  This is not new therefore EEG canceled.  "

## 2018-01-08 NOTE — THERAPY EVALUATION
Acute Care - Speech Language Pathology   Swallow Initial Evaluation [facility]   CLINICAL DYSPHAGIA EVALUATION     Patient Name: Baldo Eden  : 1955  MRN: [id number]  Today's Date: 2018     Admit Date: 2018    Baldo Eden  is seen at bedside this am on 3N to assess safety/efficacy of swallowing fnx, determine safest/least restrictive diet tolerance and candidacy for po intake. She is noted w/ presentation to [facility] ED w/ concerns for aspiration w/ po intake. Ms. Eden is awake this am, however, she is noted w/ baseline ams/dementia. She is nonverbal, does not follow simple commands and does not visually track SLP. Primary oral respiration pattern noted.     Social History     Social History   • Marital status:      Spouse name: N/A   • Number of children: N/A   • Years of education: N/A     Occupational History   • Not on file.     Social History Main Topics   • Smoking status: Never Smoker   • Smokeless tobacco: Never Used   • Alcohol use No   • Drug use: No   • Sexual activity: Defer     Other Topics Concern   • Not on file     Social History Narrative   • No narrative on file      Chest xray 18 reveals patchy R infrahilar opacities per radiologist.     Diet Orders (active)     Start     Ordered    18 0210  NPO Diet  Diet Effective Now      18 0209        Pt is observed on O2 via nonrebreather mask. Noted clavicular breathing.     Pt is positioned upright and centered in bed at approximately 50 degrees.     Facial/oral structures are symmetrical upon observation. Noted open mouth posture at rest. Lingual protrusion CNA as she is unable to imitate oral postures. Oral mucosa are mildly-moderately xerostomic, pink, and clean. Secretions are xerostomic. OROM/LOLY CNA as pt is unable to imitate oral postures. Gag is not assessed. Volitional cough and vocal quality CNA, unable to elicit w/ max model and cues.    SLP provides oral stimulation via dry spoon bowl. Pt does not attempt  bolus anticipation or acceptance despite max tactile cues. She continues w/ open mouth posture even w/ spoon bowl stimulation. Per this status, po presentations deferred at this time.    Impression: Per this evaluation, pt is felt unsafe for po intake at this time per ams and overall medical status w/ po unable to anticipate or accept po bolus despite max cues and stimulation. Continue NPO w/ SLP to f/u daily for pt status/reassessment.       Recommendations:  1. NPO.    2. Meds via non-oral method. .   3. Universal aspirations precautions.   4. ORQUIDEA precautions.  5. Oral care protocol.  SLP to f/u daily for pt status/reassessment.    D/w RNJosephine, and Dr. Felix results and recommendations w/ verbal agreement.    Thank you for allowing me to participate in the care of your patient-  Yuki Holman M.A., CCC-SLP    Visit Dx:     ICD-10-CM ICD-9-CM   1. Aspiration pneumonia of right lower lobe, unspecified aspiration pneumonia type J69.0 507.0   2. Sepsis, due to unspecified organism A41.9 038.9     995.91     Patient Active Problem List   Diagnosis   • Aspiration pneumonia of right lower lobe     Past Medical History:   Diagnosis Date   • Alzheimer disease    • Anxiety    • Dementia    • Dermatopolymyositis    • Dysphagia    • Hypertension    • Hypothyroid    • Malnutrition    • Muscle spasm    • Quadriparesis    • Vitamin D deficiency      History reviewed. No pertinent surgical history.    EDUCATION  The patient has been educated in the following areas:   Dysphagia (Swallowing Impairment) Oral Care/Hydration.    SLP Recommendation and Plan  SLP to f/u for pt status/assessment.     Time Calculation:         Time Calculation- SLP       01/08/18 1122          Time Calculation- SLP    SLP - Next Appointment 01/09/18  -        User Key  (r) = Recorded By, (t) = Taken By, (c) = Cosigned By    Initials Name Provider Type    CM Yuki Holman MA,CCC-SLP Speech Therapist        Therapy Charges for Today      Code Description Service Date Service Provider Modifiers Qty    97616612492 HC ST SWALLOWING CURRENT STATUS 1/8/2018 Yuki Holman MA,CCC-SLP GN, CM 1    96508337912 HC ST SWALLOWING PROJECTED 1/8/2018 Yuki Holman MA,CCC-SLP GN, CK 1    49512812600 HC ST EVAL ORAL PHARYNG SWALLOW 2 1/8/2018 Yuki Holman MA,CCC-SLP GN 1        SLP G-Codes  Functional Limitations: Swallowing  Swallow Current Status (): At least 80 percent but less than 100 percent impaired, limited or restricted  Swallow Goal Status (): At least 40 percent but less than 60 percent impaired, limited or restricted    Yuki Holman MA,CCC-SLP  1/8/2018

## 2018-01-08 NOTE — CONSULTS
Palliative Care Initial Consult     Attending Physician: Tammy Bueno MD  Referring Provider: Dr. Bueno    assistance with clarification of goals of care  Code Status: Conditional Code   Advanced Directives: Advance Directive: Patient has advance directive, copy in chart   Healthcare surrogate: Reportedly  though dtr has signed paperwork on file from NH, no living will on file  Goals of Care: To be determined.    HPI:  Baldo Eden is a 62 y.o. female admitted on 1/7/17 with aspiration PNA. She is a longterm NH resident at [facility] for dementia. She is nonverbal at baseline per documentation and has been on a pureed diet. No family at bedside to discuss, but RN reports that they discussed with Dr. Felix that myoclonic jerks were not new. RN is preparing to place an NGT based on PCP's conversation with the family.     Pt is minimally responsive. Neck hyperextended. Currently on NRB mask.         ROS: Negative except as above in HPI.     Past Medical History:   Diagnosis Date   • Alzheimer disease    • Anxiety    • Dementia    • Dermatopolymyositis    • Dysphagia    • Hypertension    • Hypothyroid    • Malnutrition    • Muscle spasm    • Quadriparesis    • Vitamin D deficiency      History reviewed. No pertinent surgical history.  Social History     Social History   • Marital status:      Spouse name: N/A   • Number of children: N/A   • Years of education: N/A     Occupational History   • Not on file.     Social History Main Topics   • Smoking status: Never Smoker   • Smokeless tobacco: Never Used   • Alcohol use No   • Drug use: No   • Sexual activity: Defer     Other Topics Concern   • Not on file     Social History Narrative   • No narrative on file     Family History   Problem Relation Age of Onset   • Family history unknown: Yes       Allergies   Allergen Reactions   • Aricept [Donepezil Hcl]        Current Facility-Administered Medications   Medication Dose Route Frequency Provider Last  Rate Last Dose   • dextrose 5 % and sodium chloride 0.2 % infusion  100 mL/hr Intravenous Continuous Wil Felix  mL/hr at 01/08/18 1227 100 mL/hr at 01/08/18 1227   • ipratropium-albuterol (DUO-NEB) nebulizer solution 3 mL  3 mL Nebulization Q6H PRN Tammy Bueno MD   3 mL at 01/08/18 0707   • levothyroxine sodium injection 50 mcg  50 mcg Intravenous Daily Wil Felix MD       • Magnesium Sulfate 2 gram Bolus, followed by 8 gram infusion (total Mg dose 10 grams)- Mg less than or equal to 1mg/dL  2 g Intravenous PRN Alba oJhnson PA-C        Or   • Magnesium Sulfate 6 gram Infusion (2 gm x 3) -Mg 1.1 -1.5 mg/dL  2 g Intravenous PRN Alba Johnson PA-C        Or   • magnesium sulfate 4 gram infusion- Mg 1.6-1.9 mg/dL  4 g Intravenous PRN Alba Johnson PA-C       • piperacillin-tazobactam (ZOSYN) 3.375 g/100 mL 0.9% NS IVPB (mbp)  3.375 g Intravenous Q8H Sakina Olivo, [facility]   3.375 g at 01/08/18 0549   • potassium chloride (MICRO-K) CR capsule 40 mEq  40 mEq Oral PRN Alba Johnson PA-C        Or   • potassium chloride (KLOR-CON) packet 40 mEq  40 mEq Oral PRN Alba Johnson PA-C        Or   • potassium chloride 10 mEq in 100 mL IVPB  10 mEq Intravenous Q1H PRN Alba Johnson PA-C       • potassium chloride 10 mEq in 100 mL IVPB  10 mEq Intravenous Q1H Rand Benavidez, [facility]       • potassium phosphate 45 mmol in sodium chloride 0.9 % 500 mL infusion  45 mmol Intravenous PRN Alba Johnson PA-C        Or   • potassium phosphate 30 mmol in sodium chloride 0.9 % 250 mL infusion  30 mmol Intravenous PRN Alba Johnson PA-C        Or   • potassium phosphate 15 mmol in sodium chloride 0.9 % 100 mL infusion  15 mmol Intravenous PRN Alba Johnson PA-C        Or   • sodium phosphates 45 mmol in sodium chloride 0.9 % 500 mL IVPB  45 mmol Intravenous PRN Alba Johnson PA-C        Or   • sodium phosphates 30 mmol in sodium chloride 0.9 % 250  "mL IVPB  30 mmol Intravenous PRN Alba Johnson PA-C        Or   • sodium phosphates 15 mmol in sodium chloride 0.9 % 250 mL IVPB  15 mmol Intravenous PRN Alba Johnson PA-C       • potassium phosphate 15 mmol in sodium chloride 0.9 % 100 mL infusion  15 mmol Intravenous Once Tammy Bueno MD   15 mmol at 01/08/18 1112   • sodium chloride 0.9 % flush 1-10 mL  1-10 mL Intravenous PRN Tammy Bueno MD       • sodium chloride 0.9 % flush 10 mL  10 mL Intravenous PRN JOANA Velásquez       • vancomycin (VANCOCIN) 750 mg in sodium chloride 0.9 % 250 mL IVPB  750 mg Intravenous Q18H Rand Benavidez [facility]           dextrose 5 % and sodium chloride 0.2 % 100 mL/hr Last Rate: 100 mL/hr (01/08/18 1227)     ipratropium-albuterol  •  magnesium sulfate **OR** magnesium sulfate **OR** magnesium sulfate  •  potassium chloride **OR** potassium chloride **OR** potassium chloride  •  potassium phosphate infusion greater than 15 mMoles **OR** potassium phosphate infusion greater than 15 mMoles **OR** potassium phosphate **OR** sodium phosphate IVPB **OR** sodium phosphate IVPB **OR** sodium phosphate IVPB  •  sodium chloride  •  Insert peripheral IV **AND** sodium chloride    Current medication reviewed for route, type, dose and frequency and are current per MAR.    Palliative Performance Scale Score:     /70 (BP Location: Right arm, Patient Position: Lying)  Pulse 84  Temp 98.7 °F (37.1 °C) (Axillary)   Resp 24  Ht 180.3 cm (71\")  Wt 45.9 kg (101 lb 3.2 oz)  SpO2 97%  BMI 14.11 kg/m2  No intake or output data in the 24 hours ending 01/08/18 1416    PE:  General Appearance:    Chronically ill appearing, minimally responsive, NAD   HEENT:    NC/AT, without obvious abnormality, EOMI, anicteric    Neck:   Hyperextended and stiff, trachea midline, no JVD   Lungs:     Diffuse rhonchi and upper airway congestion    Heart:    RRR, normal S1 and S2, no M/R/G   Abdomen:     Soft, NT, ND, NABS  "   Extremities:   Moves all extremities, no edema except mild erythema and swelling of L hand   Pulses:   Pulses palpable and equal bilaterally   Skin:   Warm, dry   Neurologic:   Minimally responsive   Psych:   Calm, appropriate         Labs:     Results from last 7 days  Lab Units 01/08/18  0231   WBC 10*3/mm3 17.12*   HEMOGLOBIN g/dL 13.2   HEMATOCRIT % 43.8   PLATELETS 10*3/mm3 208       Results from last 7 days  Lab Units 01/08/18  0231   SODIUM mmol/L 161*   POTASSIUM mmol/L 3.3*   CHLORIDE mmol/L 131*   CO2 mmol/L 21.7*   BUN mg/dL 15   CREATININE mg/dL 0.63   GLUCOSE mg/dL 154*   CALCIUM mg/dL 7.8       Results from last 7 days  Lab Units 01/08/18  0231   SODIUM mmol/L 161*   POTASSIUM mmol/L 3.3*   CHLORIDE mmol/L 131*   CO2 mmol/L 21.7*   BUN mg/dL 15   CREATININE mg/dL 0.63   CALCIUM mg/dL 7.8   BILIRUBIN mg/dL 0.7   ALK PHOS U/L 66   ALT (SGPT) U/L 35   AST (SGOT) U/L 29   GLUCOSE mg/dL 154*     Imaging Results (last 72 hours)     Procedure Component Value Units Date/Time    XR Chest 1 View [[id number]] Collected:  01/07/18 2355     Updated:  01/07/18 2357    Narrative:       EXAMINATION: XR CHEST 1 VW-      CLINICAL INDICATION:     congestion, soa     TECHNIQUE:  XR CHEST 1 VW-      COMPARISON: NONE      FINDINGS:   Patchy right infrahilar opacities.   Heart and mediastinal contours are unremarkable.   No pneumothorax.   No pleural effusion.   No acute osseous findings.            Impression:       Right infrahilar airspace disease.     This report was finalized on 1/7/2018 11:55 PM by Dr. Rubén Vickers MD.             Diagnostics: Reviewed    A: Baldo Eden is a 62 y.o. female with RLL aspiration PNA, early onset Alzheimer's dementia         P:   Pt is currently minimally responsive, on NRB. No family at bedside but RN reports that PCP has spoken with family and that they are proceeding with NGT placement. May have to be placed in wrist restraints to tolerate tube.     Please note that permanent  feeding tube placement is not recommended in dementia patients. It has been show to actually increase the risk of aspiration, as well as both physical and chemical restraints, and has not been show to prolong life or improve quality of life. Also need to address the role of Abx in the setting of recurrent aspiration as part of goals of care discussion. Will readdress with family when available.     PCP documentation includes that pt is a DNR but that ventilation would be ok per  who is POA. Legally unless documentation exists otherwise,  is surrogate decision maker but note that dtr has signed EMS DNR and other forms we have on file from the NH.     We appreciate the consult and the opportunity to participate in Baldo Eden's care. We will continue to follow along. Please do not hesitate to contact us regarding further symptom management or goals of care needs, including after hours or on weekends via our on call provider at [phone].     Time: 45 minutes spent reviewing medical and medication records, assessing and examining patient, discussing with family, answering questions, providing some guidance about a plan and documentation of care, and coordinating care with other healthcare members, with > 50% time spent face to face.     Debora Izquierdo MD    1/8/2018

## 2018-01-08 NOTE — PLAN OF CARE
Problem: Patient Care Overview (Adult)  Goal: Plan of Care Review   01/08/18 0240   Coping/Psychosocial Response Interventions   Plan Of Care Reviewed With patient   Patient Care Overview   Progress progress toward functional goals as expected       Problem: Skin Integrity Impairment, Risk/Actual (Adult)  Goal: Identify Related Risk Factors and Signs and Symptoms   01/08/18 0240   Skin Integrity Impairment, Risk/Actual   Skin Integrity Impairment, Risk/Actual: Related Risk Factors age extremes;cognitive impairment;infection/disease process;immobility;fluid/nutrition status;sensory impairment     Goal: Skin Integrity/Wound Healing   01/08/18 0240   Skin Integrity Impairment, Risk/Actual (Adult)   Skin Integrity/Wound Healing making progress toward outcome       Problem: Sepsis (Adult)  Goal: Signs and Symptoms of Listed Potential Problems Will be Absent or Manageable (Sepsis)   01/08/18 0240   Sepsis   Problems Assessed (Sepsis) all   Problems Present (Sepsis) progression of infection       Problem: Fall Risk (Adult)  Goal: Identify Related Risk Factors and Signs and Symptoms   01/08/18 0240   Fall Risk   Fall Risk: Related Risk Factors age-related changes;bladder function altered;confusion/agitation;gait/mobility problems;fatigue/slow reaction;neuro disease/injury;environment unfamiliar   Fall Risk: Signs and Symptoms presence of risk factors     Goal: Absence of Falls   01/08/18 0240   Fall Risk (Adult)   Absence of Falls making progress toward outcome       Problem: Dysphagia (Adult)  Goal: Identify Related Risk Factors and Signs and Symptoms   01/08/18 0240   Dysphagia   Dysphagia: Related Risk Factors functional decline;mental status altered     Goal: Functional/Safe Swallow   01/08/18 0240   Dysphagia (Adult)   Functional/Safe Swallow making progress toward outcome     Goal: Compensatory Techniques to Improve Safety/Function with Swallowing   01/08/18 0240   Dysphagia (Adult)   Compensatory Techniques to  Improve Safety/Function with Swallowing making progress toward outcome       Problem: Anxiety (Adult)  Goal: Identify Related Risk Factors and Signs and Symptoms   01/08/18 0240   Anxiety   Related Risk Factors (Anxiety) cognitive status;knowledge deficit;environment change   Signs and Symptoms (Anxiety) body tremors/twitching/restless legs     Goal: Reduction/Resolution   01/08/18 0240   Anxiety (Adult)   Reduction/Resolution making progress toward outcome

## 2018-01-09 ENCOUNTER — APPOINTMENT (OUTPATIENT)
Dept: GENERAL RADIOLOGY | Facility: HOSPITAL | Age: 63
End: 2018-01-09

## 2018-01-09 LAB
ANION GAP SERPL CALCULATED.3IONS-SCNC: 3.3 MMOL/L (ref 3.6–11.2)
BUN BLD-MCNC: 10 MG/DL (ref 7–21)
BUN/CREAT SERPL: 20.8 (ref 7–25)
CALCIUM SPEC-SCNC: 8 MG/DL (ref 7.7–10)
CHLORIDE SERPL-SCNC: 120 MMOL/L (ref 99–112)
CK MB SERPL-CCNC: 0.18 NG/ML (ref 0–5)
CK MB SERPL-RTO: 0.1 % (ref 0–3)
CK SERPL-CCNC: 189 U/L (ref 24–173)
CO2 SERPL-SCNC: 25.7 MMOL/L (ref 24.3–31.9)
CREAT BLD-MCNC: 0.48 MG/DL (ref 0.43–1.29)
GFR SERPL CREATININE-BSD FRML MDRD: 131 ML/MIN/1.73
GLUCOSE BLD-MCNC: 133 MG/DL (ref 70–110)
MAGNESIUM SERPL-MCNC: 2.1 MG/DL (ref 1.7–2.6)
OSMOLALITY SERPL CALC.SUM OF ELEC: 297.1 MOSM/KG (ref 273–305)
PHOSPHATE SERPL-MCNC: 1.9 MG/DL (ref 2.7–4.5)
POTASSIUM BLD-SCNC: 3.2 MMOL/L (ref 3.5–5.3)
POTASSIUM BLD-SCNC: 3.4 MMOL/L (ref 3.5–5.3)
POTASSIUM BLD-SCNC: 3.9 MMOL/L (ref 3.5–5.3)
SODIUM BLD-SCNC: 144 MMOL/L (ref 135–153)
SODIUM BLD-SCNC: 149 MMOL/L (ref 135–153)
TROPONIN I SERPL-MCNC: 0.02 NG/ML

## 2018-01-09 PROCEDURE — 84295 ASSAY OF SERUM SODIUM: CPT | Performed by: INTERNAL MEDICINE

## 2018-01-09 PROCEDURE — 83735 ASSAY OF MAGNESIUM: CPT | Performed by: INTERNAL MEDICINE

## 2018-01-09 PROCEDURE — 80048 BASIC METABOLIC PNL TOTAL CA: CPT | Performed by: INTERNAL MEDICINE

## 2018-01-09 PROCEDURE — 99233 SBSQ HOSP IP/OBS HIGH 50: CPT | Performed by: INTERNAL MEDICINE

## 2018-01-09 PROCEDURE — 71045 X-RAY EXAM CHEST 1 VIEW: CPT

## 2018-01-09 PROCEDURE — 84132 ASSAY OF SERUM POTASSIUM: CPT | Performed by: INTERNAL MEDICINE

## 2018-01-09 PROCEDURE — 25010000002 PIPERACILLIN SOD-TAZOBACTAM PER 1 G: Performed by: INTERNAL MEDICINE

## 2018-01-09 PROCEDURE — 99231 SBSQ HOSP IP/OBS SF/LOW 25: CPT | Performed by: INTERNAL MEDICINE

## 2018-01-09 PROCEDURE — 84100 ASSAY OF PHOSPHORUS: CPT | Performed by: INTERNAL MEDICINE

## 2018-01-09 PROCEDURE — 25010000002 POTASSIUM CHLORIDE PER 2 MEQ OF POTASSIUM: Performed by: INTERNAL MEDICINE

## 2018-01-09 PROCEDURE — 84484 ASSAY OF TROPONIN QUANT: CPT | Performed by: INTERNAL MEDICINE

## 2018-01-09 PROCEDURE — 71045 X-RAY EXAM CHEST 1 VIEW: CPT | Performed by: RADIOLOGY

## 2018-01-09 PROCEDURE — 82550 ASSAY OF CK (CPK): CPT | Performed by: INTERNAL MEDICINE

## 2018-01-09 PROCEDURE — 25010000002 VANCOMYCIN PER 500 MG: Performed by: INTERNAL MEDICINE

## 2018-01-09 PROCEDURE — 94799 UNLISTED PULMONARY SVC/PX: CPT

## 2018-01-09 PROCEDURE — 82553 CREATINE MB FRACTION: CPT | Performed by: INTERNAL MEDICINE

## 2018-01-09 RX ORDER — LEVOTHYROXINE SODIUM 0.1 MG/1
100 TABLET ORAL
Status: DISCONTINUED | OUTPATIENT
Start: 2018-01-10 | End: 2018-01-17 | Stop reason: HOSPADM

## 2018-01-09 RX ORDER — POTASSIUM CHLORIDE 1.5 G/1.77G
40 POWDER, FOR SOLUTION ORAL ONCE
Status: COMPLETED | OUTPATIENT
Start: 2018-01-09 | End: 2018-01-09

## 2018-01-09 RX ORDER — POTASSIUM CHLORIDE 7.45 MG/ML
10 INJECTION INTRAVENOUS
Status: DISCONTINUED | OUTPATIENT
Start: 2018-01-09 | End: 2018-01-09 | Stop reason: CLARIF

## 2018-01-09 RX ORDER — POTASSIUM CHLORIDE 1.5 G/1.77G
40 POWDER, FOR SOLUTION ORAL EVERY 4 HOURS
Status: DISCONTINUED | OUTPATIENT
Start: 2018-01-09 | End: 2018-01-09

## 2018-01-09 RX ADMIN — POTASSIUM CHLORIDE 40 MEQ: 1.5 POWDER, FOR SOLUTION ORAL at 11:58

## 2018-01-09 RX ADMIN — POTASSIUM & SODIUM PHOSPHATES POWDER PACK 280-160-250 MG 1 PACKET: 280-160-250 PACK at 16:54

## 2018-01-09 RX ADMIN — POTASSIUM & SODIUM PHOSPHATES POWDER PACK 280-160-250 MG 1 PACKET: 280-160-250 PACK at 10:18

## 2018-01-09 RX ADMIN — PIPERACILLIN SODIUM,TAZOBACTAM SODIUM 3.38 G: 3; .375 INJECTION, POWDER, FOR SOLUTION INTRAVENOUS at 21:08

## 2018-01-09 RX ADMIN — ASPIRIN 81 MG: 81 TABLET, CHEWABLE ORAL at 08:33

## 2018-01-09 RX ADMIN — POTASSIUM CHLORIDE 40 MEQ: 1.5 POWDER, FOR SOLUTION ORAL at 08:33

## 2018-01-09 RX ADMIN — VANCOMYCIN HYDROCHLORIDE 750 MG: 1 INJECTION, POWDER, LYOPHILIZED, FOR SOLUTION INTRAVENOUS at 12:25

## 2018-01-09 RX ADMIN — ACETAMINOPHEN 650 MG: 650 SOLUTION ORAL at 12:03

## 2018-01-09 RX ADMIN — POTASSIUM PHOSPHATE, MONOBASIC AND POTASSIUM PHOSPHATE, DIBASIC 15 MMOL: 224; 236 INJECTION, SOLUTION INTRAVENOUS at 10:23

## 2018-01-09 RX ADMIN — IPRATROPIUM BROMIDE AND ALBUTEROL SULFATE 3 ML: .5; 3 SOLUTION RESPIRATORY (INHALATION) at 07:30

## 2018-01-09 RX ADMIN — DEXTROSE AND SODIUM CHLORIDE 80 ML/HR: 5; 200 INJECTION, SOLUTION INTRAVENOUS at 05:24

## 2018-01-09 RX ADMIN — DEXTROSE MONOHYDRATE AND SODIUM CHLORIDE: 5; .45 INJECTION, SOLUTION INTRAVENOUS at 14:18

## 2018-01-09 RX ADMIN — PIPERACILLIN SODIUM,TAZOBACTAM SODIUM 3.38 G: 3; .375 INJECTION, POWDER, FOR SOLUTION INTRAVENOUS at 14:17

## 2018-01-09 RX ADMIN — PIPERACILLIN SODIUM,TAZOBACTAM SODIUM 3.38 G: 3; .375 INJECTION, POWDER, FOR SOLUTION INTRAVENOUS at 05:25

## 2018-01-09 RX ADMIN — LEVOTHYROXINE SODIUM ANHYDROUS 50 MCG: 100 INJECTION, POWDER, LYOPHILIZED, FOR SOLUTION INTRAVENOUS at 10:11

## 2018-01-09 NOTE — PROGRESS NOTES
"  I have seen the patient in conjunction with Camelia JOLLEY and       History of presenting illness:  Patient cannot give a history due to her medical condition.  Patient's  thinks that she is looking a little better although still not responding and what she has not done for a significant amount time.  He does think she is \"looking a little better\".    Vital Signs  Temp:  [98.7 °F (37.1 °C)-101.2 °F (38.4 °C)] 99.2 °F (37.3 °C)  Heart Rate:  [72-87] 83  Resp:  [18-22] 20  BP: (124-134)/(52-89) 128/84  Body mass index is 15.29 kg/(m^2).      Intake/Output Summary (Last 24 hours) at 01/09/18 1554  Last data filed at 01/09/18 1225   Gross per 24 hour   Intake             2290 ml   Output                0 ml   Net             2290 ml     Intake & Output (last 3 days)       01/06 0701 - 01/07 0700 01/07 0701 - 01/08 0700 01/08 0701 - 01/09 0700 01/09 0701 - 01/10 0700    I.V. (mL/kg)   1000 (20.1)     Other   60 480    IV Piggyback   400 350    Total Intake(mL/kg)   1460 (29.4) 830 (16.7)    Net     +1460 +830            Unmeasured Urine Occurrence  1 x 8 x     Unmeasured Stool Occurrence  0 x 0 x           Physical exam:  Physical Exam   Constitutional: Well-developed, Thin female, The myoclonic jerks appear to be improved.  Head: Normocephalic and atraumatic.  Although mucous membranes still appear dry they appear more moist than yesterday.  NG tube is in place  Eyes:  Pupils are equal, round, no nystagmus noted today  Cardiovascular: Normal rate, regular rhythm and normal heart sounds.  No murmur rub or gallop  Pulmonary/Chest: Bilateral breath sounds still diminished throughout occasional rhonchi otherwise clear  Abdominal: Soft, flat, bowel sounds are active, nondistended nontender.  No peritoneal signs   Musculoskeletal: Cannot test strength, she has significant muscle wasting intraosseous in her hands   Neurological: Not responsive to any stimuli  Skin: Skin is warm and dry.  some linear red streaks in " between the digits on the left hand with some vesicles noted and have been seen by wound care.  I'm uncertain how long these have been there.    Psychiatric:  Cannot test mood    Telemetry: Sinus with occasional PVCs    Results Review:  Lab Results   Component Value Date    WBC 17.12 (H) 01/08/2018    HGB 13.2 01/08/2018    HCT 43.8 01/08/2018    MCV 96.1 (H) 01/08/2018     01/08/2018     Lab Results   Component Value Date    GLUCOSE 133 (H) 01/09/2018    BUN 10 01/09/2018    CREATININE 0.48 01/09/2018    EGFRIFNONA 131 01/09/2018    EGFRIFAFRI 121 10/07/2016    BCR 20.8 01/09/2018    CO2 25.7 01/09/2018    CALCIUM 8.0 01/09/2018    ALBUMIN 3.20 (L) 01/08/2018    LABIL2 1.0 (L) 01/08/2018    AST 29 01/08/2018    ALT 35 01/08/2018       Imaging Results (last 72 hours)     Procedure Component Value Units Date/Time    XR Chest 1 View [[id number]] Collected:  01/07/18 2355     Updated:  01/07/18 3406    Narrative:       EXAMINATION: XR CHEST 1 VW-      CLINICAL INDICATION:     congestion, soa     TECHNIQUE:  XR CHEST 1 VW-      COMPARISON: NONE      FINDINGS:   Patchy right infrahilar opacities.   Heart and mediastinal contours are unremarkable.   No pneumothorax.   No pleural effusion.   No acute osseous findings.            Impression:       Right infrahilar airspace disease.     This report was finalized on 1/7/2018 11:55 PM by Dr. Rubén Vickers MD.         Chest x-ray image from today noted, improving infiltrates      Assessment/Plan     Active Problems:  Severe sepsis with hypoxic respiratory failure superimposed on right pneumonia probable aspiration.  Patient is on Zosyn and vancomycin. Cultures are negative.  Patient's saturations are now good on high flow oxygen at 67%.  Continue to current supportive    Myoclonic movements, resolved    Hypothyroidism, slightly over replaced, now that her NG tube is functioning I'm changing her to by mouth Route.  I've decreased her dose slightly.    Nutrition,  tolerating tube feeds, these are being titrated towards goal 30 cc/h, per nursing note residual.  I have explained that she continues not respond well and be able to eat that we may have to consider more long-term feeding tube solution.    Alzheimer's disease, advanced diagnosis prior to this admission adenosis is poor    Hand vesicles, exact etiology is uncertain but should heal.    Hypernatremia, improving, continue to follow.    Hypokalemia, supplemented by protocol    Hypophosphatemia, being supplemented    Abnormal EKG possible ischemia, on aspirin, considering her overall underlying illness chronically no further intervention is witnessed by family at this time.    DVT prophylaxis, SCDs      Wil Felix MD  01/09/18  3:54 PM

## 2018-01-09 NOTE — PLAN OF CARE
Problem: Patient Care Overview (Adult)  Goal: Plan of Care Review  Outcome: Ongoing (interventions implemented as appropriate)    Goal: Discharge Needs Assessment  Outcome: Ongoing (interventions implemented as appropriate)      Problem: Skin Integrity Impairment, Risk/Actual (Adult)  Goal: Identify Related Risk Factors and Signs and Symptoms  Outcome: Ongoing (interventions implemented as appropriate)    Goal: Skin Integrity/Wound Healing  Outcome: Ongoing (interventions implemented as appropriate)    Goal: Identify Related Risk Factors and Signs and Symptoms  Outcome: Ongoing (interventions implemented as appropriate)    Goal: Skin Integrity/Wound Healing  Outcome: Ongoing (interventions implemented as appropriate)      Problem: Sepsis (Adult)  Goal: Signs and Symptoms of Listed Potential Problems Will be Absent or Manageable (Sepsis)  Outcome: Ongoing (interventions implemented as appropriate)      Problem: Fall Risk (Adult)  Goal: Absence of Falls  Outcome: Ongoing (interventions implemented as appropriate)      Problem: Dysphagia (Adult)  Goal: Identify Related Risk Factors and Signs and Symptoms  Outcome: Ongoing (interventions implemented as appropriate)    Goal: Functional/Safe Swallow  Outcome: Ongoing (interventions implemented as appropriate)    Goal: Compensatory Techniques to Improve Safety/Function with Swallowing  Outcome: Ongoing (interventions implemented as appropriate)      Problem: Anxiety (Adult)  Goal: Identify Related Risk Factors and Signs and Symptoms  Outcome: Ongoing (interventions implemented as appropriate)    Goal: Reduction/Resolution  Outcome: Ongoing (interventions implemented as appropriate)      Problem: Infection, Risk/Actual (Adult)  Goal: Identify Related Risk Factors and Signs and Symptoms  Outcome: Ongoing (interventions implemented as appropriate)    Goal: Infection Prevention/Resolution  Outcome: Ongoing (interventions implemented as appropriate)      Problem: Pressure Ulcer  (Adult)  Goal: Signs and Symptoms of Listed Potential Problems Will be Absent or Manageable (Pressure Ulcer)  Outcome: Ongoing (interventions implemented as appropriate)      Problem: Mobility, Physical Impaired (Adult)  Goal: Identify Related Risk Factors and Signs and Symptoms  Outcome: Ongoing (interventions implemented as appropriate)    Goal: Enhanced Mobility Skills  Outcome: Ongoing (interventions implemented as appropriate)    Goal: Enhanced Functionality Ability  Outcome: Ongoing (interventions implemented as appropriate)      Problem: Respiratory Insufficiency (Adult)  Goal: Identify Related Risk Factors and Signs and Symptoms  Outcome: Ongoing (interventions implemented as appropriate)    Goal: Acid/Base Balance  Outcome: Ongoing (interventions implemented as appropriate)    Goal: Effective Ventilation  Outcome: Ongoing (interventions implemented as appropriate)

## 2018-01-09 NOTE — PROGRESS NOTES
No family present during my rounds- though  was just on the floor per RN staff.     We will continue to follow along. Please do not hesitate to contact us regarding further sx mgmt or GOC needs, including after hours or on weekends via our on call provider at [phone].        Debora Izquierdo MD    1/9/2018

## 2018-01-09 NOTE — SIGNIFICANT NOTE
F/u this am for pt status on 3N. Nursing staff is present. Pt is noted on high flow NC today. She continues w/ ams, not responsive/interactive to stimuli. She is noted today w/ continuous dental grinding. She does not respond to dry spoon bowl stimulation to labial surface. She is noted s/p NG tube placement, tolerating TF. She continues felt unsafe for po intake at this time, unable to functionally participate in formal dysphagia evaluation. Please continue universal aspiration precautions, ORQUIDEA precautions, oral care protocol.     D/w Dr. Felix pt status w/ verbal order for no further SLP f/u at this time 2/2 current medical status/ams.     Thank you for allowing me to participate in the care of your patient-  Yuki Holman M.A., CCC-SLP

## 2018-01-09 NOTE — PROGRESS NOTES
LOS: 1 day     Chief Complaint:  Pulmonology is following for aspiration pneumonia    Subjective     Interval History: Ms. Eden is resting in bed.  No acute distress noted.    History taken from: chart RN    Review of Systems:     Review of Systems - History obtained from unobtainable from patient due to mental status                    Objective     Vital Signs  Temp:  [98.7 °F (37.1 °C)-101.2 °F (38.4 °C)] 98.9 °F (37.2 °C)  Heart Rate:  [46-87] 77  Resp:  [18-24] 20  BP: (124-158)/(52-89) 124/89  Body mass index is 15.29 kg/(m^2).    Intake/Output Summary (Last 24 hours) at 01/09/18 1145  Last data filed at 01/09/18 1023   Gross per 24 hour   Intake             1920 ml   Output                0 ml   Net             1920 ml     I/O this shift:  In: 460 [Other:360; IV Piggyback:100]  Out: -     Physical Exam:  GENERAL APPEARANCE: frail, chronically ill appearing. Contracted.    HEAD: normocephalic.    EYES: PERRL    NECK: Neck supple.     CARDIAC: Normal S1 and S2. No S3, S4 or murmurs. Rhythm is regular. There is no peripheral edema, cyanosis or pallor. Extremities are warm and well perfused. Capillary refill is less than 2 seconds. No carotid bruits.    RESPIRATORY: Clear to auscultation and percussion without rales, rhonchi, wheezing or diminished breath sounds.    GI: Positive bowel sounds. Soft, nondistended, nontender.     MUSCULOSKELTAL: No significant deformity or joint abnormality. No edema. Peripheral pulses intact.     NEUROLOGICAL: Unable to assess due to mental status.    PSYCHIATRIC: Unable to assess due to mental status.                Results Review:                I reviewed the patient's new clinical results.  I reviewed the patient's new imaging results and agree with the interpretation.    Results from last 7 days  Lab Units 01/08/18  0231 01/07/18  2119   WBC 10*3/mm3 17.12* 19.68*  21.23*   HEMOGLOBIN g/dL 13.2 15.6  15.2   PLATELETS 10*3/mm3 208 283  296       Results from last 7  days  Lab Units 01/09/18  0713 01/09/18  0522 01/09/18  0021 01/08/18  1558 01/08/18  0231 01/07/18  2119   SODIUM mmol/L 149  149  --  149 155* 161* 162*   POTASSIUM mmol/L 3.2* 3.4*  --   --  3.3* 3.7   CHLORIDE mmol/L 120*  --   --   --  131* 126*   CO2 mmol/L 25.7  --   --   --  21.7* 24.2*   BUN mg/dL 10  --   --   --  15 20   CREATININE mg/dL 0.48  --   --   --  0.63 0.81   CALCIUM mg/dL 8.0  --   --   --  7.8 9.0   GLUCOSE mg/dL 133*  --   --   --  154* 180*   MAGNESIUM mg/dL  --  2.1  --   --  2.2  --      No results found for: INR, PROTIME    Results from last 7 days  Lab Units 01/08/18  0231 01/07/18  2119   ALK PHOS U/L 66 82   BILIRUBIN mg/dL 0.7 0.6   ALT (SGPT) U/L 35 42*   AST (SGOT) U/L 29 35*       Results from last 7 days  Lab Units 01/08/18  1225   PH, ARTERIAL pH units 7.508*   PO2 ART mm Hg 155.0*   PCO2, ARTERIAL mm Hg 29.4*   HCO3 ART mmol/L 22.8     Imaging Results (last 24 hours)     Procedure Component Value Units Date/Time    XR Chest 1 View [[id number]] Collected:  01/09/18 0642     Updated:  01/09/18 0646    Narrative:       EXAMINATION: XR CHEST 1 VW-      CLINICAL INDICATION:     to verify NG tube placement; J69.0-Pneumonitis  due to inhalation of food and vomit; A41.9-Sepsis, unspecified organism     TECHNIQUE:  XR CHEST 1 VW-      COMPARISON: 1/7/2018      FINDINGS:   NG tube extends below diaphragm presumably into stomach.   Left basilar atelectasis.   Potential or congestion.   Right infrahilar opacities improved.   No effusion or pneumothorax.            Impression:       1. Interval placement of NG tube.  2. Improvement in right basilar airspace disease with increasing left  basilar atelectasis. Pulmonary vascular congestion noted.     This report was finalized on 1/9/2018 6:43 AM by Dr. Rubén Vickers MD.       XR Chest 1 View [[id number]] Collected:  01/09/18 0643     Updated:  01/09/18 0646    Narrative:       EXAMINATION: XR CHEST 1 VW-      CLINICAL INDICATION:     NG Tube  Placement; J69.0-Pneumonitis due to  inhalation of food and vomit; A41.9-Sepsis, unspecified organism     TECHNIQUE:  XR CHEST 1 VW-      COMPARISON: 1/8/2018      FINDINGS:   Stable NG tube positioning. Basilar airspace disease improved since  previous. Chest otherwise stable. No pneumothorax.       Impression:       Stable NG tube positioning. Improvement in basilar  opacities.     This report was finalized on 1/9/2018 6:44 AM by Dr. Rubén Vickers MD.                Medication Review:   Scheduled Medications:    aspirin 81 mg Oral Daily   levothyroxine sodium 50 mcg Intravenous Daily   IVPB builder 3.375 g Intravenous Q8H   potassium & sodium phosphates 1 packet Oral BID AC   potassium chloride 40 mEq Per G Tube Once   potassium phosphate 15 mmol Intravenous Once   IVPB builder 750 mg Intravenous Q18H     Continuous infusions:    custom IV infusion builder        Assessment/Plan      Pneumonia: Likely related to aspiration.    Chest x-ray reviewed-no changes noted.    Patient is on 69% high flow oxygen.    Continue scheduled inhalants and as needed neb treatments.     Hypernatremia:  Level 161.   discontinued IV fluids.     Hypokalemia: Level 3.2.  Replaced, continue to monitor.    Hypophosphatemia: Level 1.9.  Replaced, continue to monitor.     Patient remains a DNR/DNI.    Palliative care is following.           Patient Active Problem List   Diagnosis Code   • Aspiration pneumonia of right lower lobe J69.0             JOANA Jeff  01/09/18  11:45 AM        Attestation: Scribed for Dr. Lantigua, by JOANA Shaw      I, Jef Lantigua M.D. attest that the above note accurately reflects the work and decisions made  by me.  Patient was seen and evaluated by Dr. Lantigua, including history of present illness, physical exam, assessment, and treatment plan.  The above note was reviewed and edited by Dr. Lantigua.

## 2018-01-09 NOTE — PLAN OF CARE
Problem: Patient Care Overview (Adult)  Goal: Plan of Care Review  Outcome: Ongoing (interventions implemented as appropriate)   01/08/18 0240 01/09/18 0740   Coping/Psychosocial Response Interventions   Plan Of Care Reviewed With --  patient   Patient Care Overview   Progress progress toward functional goals as expected --      Goal: Adult Individualization and Mutuality  Outcome: Ongoing (interventions implemented as appropriate)    Goal: Discharge Needs Assessment  Outcome: Ongoing (interventions implemented as appropriate)   01/08/18 0253 01/08/18 0301 01/08/18 1020   Discharge Needs Assessment   Discharge Disposition --  --  skilled nursing facility  (SS notified [facility] per [place] who states pt has a 14 day skilled bed hold. )   Living Environment   Transportation Available --  ambulance --    Self-Care   Equipment Currently Used at Home none --  --        Problem: Skin Integrity Impairment, Risk/Actual (Adult)  Intervention: Promote/Optimize Nutrition   01/09/18 0100   Hygiene Care Assistance   Oral Care oral swabbing     Intervention: Prevent/Manage Excess Moisture   01/09/18 0100 01/09/18 0740   Hygiene Care Assistance   Perineal Care cleansed;absorbent pad changed;perianal area cleansed;protective cream applied --    Hygiene Care   Bathing/Skin Care back care;bath, complete;dressed/undressed;incontinence care;linen changed --    Skin Interventions   Skin Protection --  adhesive use limited;electrode sites changed;protective footwear used;pulse oximeter probe site changed;tubing/devices free from skin contact     Intervention: Prevent/Minimize Sheer/Friction Injuries   01/09/18 0740   Positioning   Positioning/Transfer Devices pillows;in use   Skin Interventions   Pressure Reduction Techniques frequent weight shift encouraged;heels elevated off bed;weight shift assistance provided   Pressure Reduction Devices pressure-redistributing mattress utilized     Intervention: Promote/Optimize  Nutrition   01/09/18 0100   Hygiene Care Assistance   Oral Care oral swabbing     Intervention: Prevent/Manage Excess Moisture   01/09/18 0100 01/09/18 0740   Hygiene Care Assistance   Perineal Care cleansed;absorbent pad changed;perianal area cleansed;protective cream applied --    Hygiene Care   Bathing/Skin Care back care;bath, complete;dressed/undressed;incontinence care;linen changed --    Skin Interventions   Skin Protection --  adhesive use limited;electrode sites changed;protective footwear used;pulse oximeter probe site changed;tubing/devices free from skin contact     Intervention: Prevent/Minimize Sheer/Friction Injuries   01/09/18 0740   Positioning   Positioning/Transfer Devices pillows;in use   Skin Interventions   Pressure Reduction Techniques frequent weight shift encouraged;heels elevated off bed;weight shift assistance provided   Pressure Reduction Devices pressure-redistributing mattress utilized       Goal: Identify Related Risk Factors and Signs and Symptoms  Outcome: Ongoing (interventions implemented as appropriate)   01/08/18 0240   Skin Integrity Impairment, Risk/Actual   Skin Integrity Impairment, Risk/Actual: Related Risk Factors age extremes;cognitive impairment;infection/disease process;immobility;fluid/nutrition status;sensory impairment     Goal: Skin Integrity/Wound Healing  Outcome: Ongoing (interventions implemented as appropriate)   01/09/18 0046   Skin Integrity Impairment, Risk/Actual (Adult)   Skin Integrity/Wound Healing making progress toward outcome     Goal: Identify Related Risk Factors and Signs and Symptoms  Outcome: Ongoing (interventions implemented as appropriate)   01/08/18 0240   Skin Integrity Impairment, Risk/Actual   Skin Integrity Impairment, Risk/Actual: Related Risk Factors age extremes;cognitive impairment;infection/disease process;immobility;fluid/nutrition status;sensory impairment     Goal: Skin Integrity/Wound Healing  Outcome: Ongoing (interventions  implemented as appropriate)   01/09/18 0046   Skin Integrity Impairment, Risk/Actual (Adult)   Skin Integrity/Wound Healing making progress toward outcome       Problem: Sepsis (Adult)  Intervention: Promote Rest/Minimize Oxygen Consumption   01/08/18 2000 01/09/18 0740   Activity   Activity Type --  bedrest   Restraint Interventions   Range of Motion --  LUE;LLE   LUE Range of Motion --  other (see comments)  (contracted)   LLE Range of Motion other (see comments)  (contracted) --    Coping/Psychosocial Interventions   Environmental Support --  calm environment promoted;caregiver consistency promoted;distractions minimized;environmental consistency promoted;rest periods encouraged   Cardiac Interventions   Warming Thermoregulation Maintenance --  warm blankets applied     Intervention: Provide Oxygenation/Ventilation/Perfusion Support   01/09/18 0740   Activity   Activity Type bedrest   Safety Interventions   Medication Review/Management medications reviewed   Positioning   Head Of Bed (HOB) Position HOB at 20 degrees   Respiratory Interventions   Airway/Ventilation Management airway patency maintained;calming measures promoted;humidification applied     Intervention: Support Psychosocial Response to Life-changing Event/Hospitalization   01/09/18 0740   Coping Strategies   Family/Support System Care support provided   Supportive Measures positive reinforcement provided;relaxation techniques promoted   Coping/Psychosocial Interventions   Environmental Support calm environment promoted;caregiver consistency promoted;distractions minimized;environmental consistency promoted;rest periods encouraged     Intervention: Prevent Infection Progression   01/09/18 0740   Safety Interventions   Infection Prevention barrier precautions utilized;rest/sleep promoted   Safety Interventions   Infection Management aseptic technique maintained     Intervention: Monitor/Manage Perfusion   01/09/18 0740   Safety Interventions    Medication Review/Management medications reviewed     Intervention: Prevent/Manage DVT/VTE Risk   01/09/18 0740   Support Surgical/Anesthesia Recovery   Venous Thromboembolism Prevent/Manage bilateral;sequential compression devices on     Intervention: Provide Initial Aggressive Fluid Resuscitation/Correction of Imbalance   01/09/18 0740   Nutrition Interventions   Fluid/Electrolyte Management fluids adjusted;intravenous fluids adjusted     Intervention: Provide Early Enteral Nutrition Therapy   01/09/18 0740   Nutrition Interventions   Nutrition Support Management tube feeding formula adjusted       Goal: Signs and Symptoms of Listed Potential Problems Will be Absent or Manageable (Sepsis)  Outcome: Ongoing (interventions implemented as appropriate)   01/08/18 0240 01/09/18 0046   Sepsis   Problems Assessed (Sepsis) --  all   Problems Present (Sepsis) progression of infection --        Problem: Fall Risk (Adult)  Intervention: Monitor/Assist with Self Care   01/09/18 0740   Monitor/Assist with Self Care   Ambulation 4-->completely dependent   Transferring 4-->completely dependent   Toileting 4-->completely dependent   Bathing 4-->completely dependent   Dressing 4-->completely dependent   Eating 4-->completely dependent   Communication 3-->unable to speak (not related to language barrier)   Swallowing (if score 2 or more for any item, consult Rehab Services) 2-->difficulty swallowing liquids/foods   Activity   Activity Type bedrest   Activity Level of Assistance assistance, 2 people   Musculoskeletal Interventions   Self-Care Promotion BADL personal objects within reach     Intervention: Reduce Risk/Promote Restraint Free Environment   01/09/18 0740   Safety Interventions   Safety/Security Measures bed alarm set; at bedside   Environmental Safety Modification assistive device/personal items within reach;clutter free environment maintained;room near unit station   Restraint Interventions   Safety  Promotion/Fall Prevention safety round/check completed;fall prevention program maintained     Intervention: Review Medications/Identify Contributors to Fall Risk   01/09/18 0740   Safety Interventions   Medication Review/Management medications reviewed       Goal: Identify Related Risk Factors and Signs and Symptoms  Outcome: Ongoing (interventions implemented as appropriate)   01/08/18 0240   Fall Risk   Fall Risk: Related Risk Factors age-related changes;bladder function altered;confusion/agitation;gait/mobility problems;fatigue/slow reaction;neuro disease/injury;environment unfamiliar   Fall Risk: Signs and Symptoms presence of risk factors     Goal: Absence of Falls  Outcome: Ongoing (interventions implemented as appropriate)   01/09/18 0046   Fall Risk (Adult)   Absence of Falls making progress toward outcome       Problem: Dysphagia (Adult)  Intervention: Monitor/Manage Eating/Swallowing Impairment   01/09/18 0740   Safety Interventions   Aspiration Precautions NPO pending swallow screening/evaluation;tube feeding placement verified       Goal: Identify Related Risk Factors and Signs and Symptoms  Outcome: Ongoing (interventions implemented as appropriate)   01/08/18 0240   Dysphagia   Dysphagia: Related Risk Factors functional decline;mental status altered     Goal: Functional/Safe Swallow  Outcome: Ongoing (interventions implemented as appropriate)   01/09/18 0046   Dysphagia (Adult)   Functional/Safe Swallow making progress toward outcome     Goal: Compensatory Techniques to Improve Safety/Function with Swallowing  Outcome: Ongoing (interventions implemented as appropriate)   01/08/18 0240   Dysphagia (Adult)   Compensatory Techniques to Improve Safety/Function with Swallowing making progress toward outcome       Problem: Anxiety (Adult)  Intervention: Promote Anxiety Reduction/Resolution   01/09/18 0740   Coping Strategies   Supportive Measures positive reinforcement provided;relaxation techniques promoted    Family/Support System Care support provided   Coping/Psychosocial Interventions   Environmental Support calm environment promoted;caregiver consistency promoted;distractions minimized;environmental consistency promoted;rest periods encouraged   Cognitive Interventions   Sensory Stimulation Regulation care clustered;quiet environment promoted       Goal: Identify Related Risk Factors and Signs and Symptoms  Outcome: Ongoing (interventions implemented as appropriate)   01/08/18 0240   Anxiety   Related Risk Factors (Anxiety) cognitive status;knowledge deficit;environment change   Signs and Symptoms (Anxiety) body tremors/twitching/restless legs     Goal: Reduction/Resolution  Outcome: Ongoing (interventions implemented as appropriate)   01/09/18 0046   Anxiety (Adult)   Reduction/Resolution making progress toward outcome       Problem: Infection, Risk/Actual (Adult)  Intervention: Manage Suspected/Actual Infection   01/09/18 0740   Safety Interventions   Infection Management aseptic technique maintained     Intervention: Prevent Infection/Maximize Resistance   01/09/18 0100 01/09/18 0740   Hygiene Care Assistance   Perineal Care cleansed;absorbent pad changed;perianal area cleansed;protective cream applied --    Oral Care oral swabbing --    Hygiene Care   Bathing/Skin Care back care;bath, complete;dressed/undressed;incontinence care;linen changed --    Respiratory Interventions   Airway/Ventilation Management --  airway patency maintained;calming measures promoted;humidification applied   Pain/Comfort/Sleep Interventions   Sleep/Rest Enhancement --  awakenings minimized;noise level reduced;regular sleep/rest pattern promoted;relaxation techniques promoted;therapeutic touch utilized       Goal: Identify Related Risk Factors and Signs and Symptoms  Outcome: Ongoing (interventions implemented as appropriate)   01/08/18 0240   Infection, Risk/Actual   Infection, Risk/Actual: Related Risk Factors age extremes;chronic  illness/condition;exposure to microbes;malnutrition;skin integrity impairment   Signs and Symptoms (Infection, Risk/Actual) malaise;weakness;respiratory rate increase     Goal: Infection Prevention/Resolution  Outcome: Ongoing (interventions implemented as appropriate)   01/09/18 0046   Infection, Risk/Actual (Adult)   Infection Prevention/Resolution making progress toward outcome       Problem: Pressure Ulcer (Adult)  Intervention: Promote/Optimize Nutrition   01/09/18 0100   Hygiene Care Assistance   Oral Care oral swabbing     Intervention: Prevent/Manage Excess Moisture   01/09/18 0100 01/09/18 0740   Hygiene Care Assistance   Perineal Care cleansed;absorbent pad changed;perianal area cleansed;protective cream applied --    Hygiene Care   Bathing/Skin Care back care;bath, complete;dressed/undressed;incontinence care;linen changed --    Skin Interventions   Skin Protection --  adhesive use limited;electrode sites changed;protective footwear used;pulse oximeter probe site changed;tubing/devices free from skin contact     Intervention: Position/Reposition to Promote Ulcer Healing   01/09/18 0500 01/09/18 0740   Positioning   Positioning side lying, right --    Head Of Bed (HOB) Position --  HOB at 20 degrees   Skin Interventions   Pressure Reduction Devices --  pressure-redistributing mattress utilized     Intervention: Prevent/Minimize Sheer/Friction Injuries   01/09/18 0740   Positioning   Positioning/Transfer Devices pillows;in use   Skin Interventions   Pressure Reduction Techniques frequent weight shift encouraged;heels elevated off bed;weight shift assistance provided   Pressure Reduction Devices pressure-redistributing mattress utilized       Goal: Signs and Symptoms of Listed Potential Problems Will be Absent or Manageable (Pressure Ulcer)  Outcome: Ongoing (interventions implemented as appropriate)   01/09/18 0046   Pressure Ulcer   Problems Assessed (Pressure Ulcer) all   Problems Present (Pressure Ulcer)  wound progression/extension;wound complications;infection       Problem: Mobility, Physical Impaired (Adult)  Intervention: Monitor/Assist with Self Care   01/09/18 0740   Monitor/Assist with Self Care   Ambulation 4-->completely dependent   Transferring 4-->completely dependent   Toileting 4-->completely dependent   Bathing 4-->completely dependent   Dressing 4-->completely dependent   Eating 4-->completely dependent   Communication 3-->unable to speak (not related to language barrier)   Swallowing (if score 2 or more for any item, consult Rehab Services) 2-->difficulty swallowing liquids/foods   Activity   Activity Type bedrest   Activity Level of Assistance assistance, 2 people   Musculoskeletal Interventions   Self-Care Promotion BADL personal objects within reach     Intervention: Optimize Mobility   01/09/18 0740   Activity   Activity Type bedrest   Positioning   Positioning/Transfer Devices pillows;in use     Intervention: Modify Environment to Minimize Fall Risk   01/09/18 0740   Safety Interventions   Environmental Safety Modification assistive device/personal items within reach;clutter free environment maintained;room near unit station   Safety/Security Measures bed alarm set; at bedside   Restraint Interventions   Safety Promotion/Fall Prevention safety round/check completed;fall prevention program maintained     Intervention: Promote Energy Conservation   01/09/18 0740   Coping/Psychosocial Interventions   Environmental Support calm environment promoted;caregiver consistency promoted;distractions minimized;environmental consistency promoted;rest periods encouraged   Pain/Comfort/Sleep Interventions   Sleep/Rest Enhancement awakenings minimized;noise level reduced;regular sleep/rest pattern promoted;relaxation techniques promoted;therapeutic touch utilized       Goal: Identify Related Risk Factors and Signs and Symptoms  Outcome: Ongoing (interventions implemented as appropriate)    01/09/18 0046   Mobility, Physical Impaired   Physical Mobility, Impaired: Related Risk Factors cognitive impairment;fatigue;knowledge deficit;malnutrition   Signs and Symptoms (Physical Mobility Impaired) limited ability to perform gross/fine motor skills;unsafe transfers/ambulation     Goal: Enhanced Mobility Skills  Outcome: Ongoing (interventions implemented as appropriate)   01/09/18 0046   Mobility, Physical Impaired (Adult)   Enhanced Mobility Skills making progress toward outcome     Goal: Enhanced Functionality Ability  Outcome: Ongoing (interventions implemented as appropriate)   01/09/18 0046   Mobility, Physical Impaired (Adult)   Enhanced Functionality Ability making progress toward outcome       Problem: Respiratory Insufficiency (Adult)  Goal: Identify Related Risk Factors and Signs and Symptoms  Outcome: Ongoing (interventions implemented as appropriate)   01/09/18 0046   Respiratory Insufficiency   Related Risk Factors (Respiratory Insufficiency) bedrest   Signs and Symptoms (Respiratory Insufficiency) abnormal breath sounds;cough (productive/ineffective);decreased oxygen saturation;level of consciousness change;respiratory rate alterations;sleep apnea/periodic apnea;sleeplessness/fatigue;use of accessory muscles     Goal: Acid/Base Balance  Outcome: Ongoing (interventions implemented as appropriate)   01/09/18 0046   Respiratory Insufficiency (Adult)   Acid/Base Balance making progress toward outcome     Goal: Effective Ventilation  Outcome: Ongoing (interventions implemented as appropriate)   01/09/18 0046   Respiratory Insufficiency (Adult)   Effective Ventilation making progress toward outcome

## 2018-01-09 NOTE — PROGRESS NOTES
Nephrology Note        Subjective     She is on high flow oxygen. Non verbal. No distress. No vomiting or diarrhea reported  She got tube feeds at 10 cc/hr and then she pulled out NG tube which has been replaced today    Objective     Vital Signs  Temp:  [98.7 °F (37.1 °C)-101.2 °F (38.4 °C)] 98.9 °F (37.2 °C)  Heart Rate:  [46-87] 77  Resp:  [18-24] 20  BP: (124-158)/(52-89) 124/89    I/O this shift:  In: 460 [Other:360; IV Piggyback:100]  Out: -   I/O last 3 completed shifts:  In: 1460 [I.V.:1000; Other:60; IV Piggyback:400]  Out: -     Physical Examination:    General Appearance : non verbal  Head : normocephalic  Eyes :  no pallor  Throat : oral mucosa dry  Neck:  no JVD  Lungs : clear to auscultation anteriorly  Heart : regular rhythm & normal rate, normal S1, S2, no murmur   Abdomen :  normal bowel sounds, soft non-tender  Extremities :   no edema  Pulses :  palpable and equal bilaterally  Neurologic : non verbal     Laboratory Data :      WBC WBC   Date Value Ref Range Status   01/08/2018 17.12 (H) 4.50 - 12.50 10*3/mm3 Final   01/07/2018 21.23 (H) 4.50 - 12.50 10*3/mm3 Final   01/07/2018 19.68 (H) 4.50 - 12.50 10*3/mm3 Final      HGB Hemoglobin   Date Value Ref Range Status   01/08/2018 13.2 12.0 - 16.0 g/dL Final   01/07/2018 15.2 12.0 - 16.0 g/dL Final   01/07/2018 15.6 12.0 - 16.0 g/dL Final      HCT Hematocrit   Date Value Ref Range Status   01/08/2018 43.8 37.0 - 47.0 % Final   01/07/2018 48.5 (H) 37.0 - 47.0 % Final   01/07/2018 49.5 (H) 37.0 - 47.0 % Final      Platlets No results found for: LABPLAT   MCV MCV   Date Value Ref Range Status   01/08/2018 96.1 (H) 80.0 - 94.0 fL Final   01/07/2018 93.8 80.0 - 94.0 fL Final   01/07/2018 93.9 80.0 - 94.0 fL Final          Sodium Sodium   Date Value Ref Range Status   01/09/2018 149 135 - 153 mmol/L Final   01/09/2018 149 135 - 153 mmol/L Final   01/09/2018 149 135 - 153 mmol/L Final   01/08/2018 155 (H) 135 - 153 mmol/L Final   01/08/2018 161 (C) 135 - 153  mmol/L Final   01/07/2018 162 (C) 135 - 153 mmol/L Final      Potassium Potassium   Date Value Ref Range Status   01/09/2018 3.2 (L) 3.5 - 5.3 mmol/L Final   01/09/2018 3.4 (L) 3.5 - 5.3 mmol/L Final     Comment:     1+ Hemolysis    01/08/2018 3.3 (L) 3.5 - 5.3 mmol/L Final   01/07/2018 3.7 3.5 - 5.3 mmol/L Final      Chloride Chloride   Date Value Ref Range Status   01/09/2018 120 (H) 99 - 112 mmol/L Final   01/08/2018 131 (H) 99 - 112 mmol/L Final   01/07/2018 126 (H) 99 - 112 mmol/L Final      CO2 CO2   Date Value Ref Range Status   01/09/2018 25.7 24.3 - 31.9 mmol/L Final   01/08/2018 21.7 (L) 24.3 - 31.9 mmol/L Final   01/07/2018 24.2 (L) 24.3 - 31.9 mmol/L Final      BUN BUN   Date Value Ref Range Status   01/09/2018 10 7 - 21 mg/dL Final   01/08/2018 15 7 - 21 mg/dL Final   01/07/2018 20 7 - 21 mg/dL Final      Creatinine Creatinine   Date Value Ref Range Status   01/09/2018 0.48 0.43 - 1.29 mg/dL Final   01/08/2018 0.63 0.43 - 1.29 mg/dL Final   01/07/2018 0.81 0.43 - 1.29 mg/dL Final      Calcium Calcium   Date Value Ref Range Status   01/09/2018 8.0 7.7 - 10.0 mg/dL Final   01/08/2018 7.8 7.7 - 10.0 mg/dL Final   01/07/2018 9.0 7.7 - 10.0 mg/dL Final      PO4 No results found for: CAPO4   Albumin Albumin   Date Value Ref Range Status   01/08/2018 3.20 (L) 3.40 - 4.80 g/dL Final   01/07/2018 3.80 3.40 - 4.80 g/dL Final      Magnesium Magnesium   Date Value Ref Range Status   01/09/2018 2.1 1.7 - 2.6 mg/dL Final   01/08/2018 2.2 1.7 - 2.6 mg/dL Final      Uric Acid No results found for: URICACID     Radiology results :     Imaging Results (last 72 hours)     Procedure Component Value Units Date/Time    XR Chest 1 View [[id number]] Collected:  01/07/18 2355     Updated:  01/07/18 2357    Narrative:       EXAMINATION: XR CHEST 1 VW-      CLINICAL INDICATION:     congestion, soa     TECHNIQUE:  XR CHEST 1 VW-      COMPARISON: NONE      FINDINGS:   Patchy right infrahilar opacities.   Heart and mediastinal  contours are unremarkable.   No pneumothorax.   No pleural effusion.   No acute osseous findings.            Impression:       Right infrahilar airspace disease.     This report was finalized on 1/7/2018 11:55 PM by Dr. Rubén Vickers MD.               Medications:        aspirin 81 mg Oral Daily   levothyroxine sodium 50 mcg Intravenous Daily   IVPB builder 3.375 g Intravenous Q8H   potassium & sodium phosphates 1 packet Oral BID AC   potassium chloride 40 mEq Per G Tube Once   potassium phosphate 15 mmol Intravenous Once   IVPB builder 750 mg Intravenous Q18H       custom IV infusion builder        Assessment/Plan     Active Problems:    Aspiration pneumonia of right lower lobe      1. Hypernatremia : secondary to poor po intake. Sodium corrected from 162 to 149 in 34 hours. Target to maintain around 146-149 in next 12 hours  Will change IVF to dextrose 1/2 NS + 30 KCL at 40 cc/hr. Will mix antibiotics in NS   check Na q 8 hrs. If sodium < 147 dc IVF   Do not correct > 6-8 meq in 24 hr period.     2. Hypokalemia and hypophosphatemia : start phos Nak as phosphorus is still low    3. alzheimer's dementia    4. Aspiration pneumonia    Prognosis is poor  I discussed the patients findings and my recommendations with family, nursing staff and Dr Elvira Kidd MD  01/09/18  11:37 AM

## 2018-01-10 LAB
ALBUMIN SERPL-MCNC: 3.2 G/DL (ref 3.4–4.8)
ALBUMIN/GLOB SERPL: 1 G/DL (ref 1.5–2.5)
ALP SERPL-CCNC: 66 U/L (ref 35–104)
ALT SERPL W P-5'-P-CCNC: 25 U/L (ref 10–36)
ANION GAP SERPL CALCULATED.3IONS-SCNC: 5.2 MMOL/L (ref 3.6–11.2)
ANION GAP SERPL CALCULATED.3IONS-SCNC: 7.3 MMOL/L (ref 3.6–11.2)
AST SERPL-CCNC: 24 U/L (ref 10–30)
BASOPHILS # BLD AUTO: 0.03 10*3/MM3 (ref 0–0.3)
BASOPHILS NFR BLD AUTO: 0.3 % (ref 0–2)
BILIRUB SERPL-MCNC: 1 MG/DL (ref 0.2–1.8)
BUN BLD-MCNC: 6 MG/DL (ref 7–21)
BUN BLD-MCNC: 6 MG/DL (ref 7–21)
BUN/CREAT SERPL: 13.3 (ref 7–25)
BUN/CREAT SERPL: 13.3 (ref 7–25)
CALCIUM SPEC-SCNC: 8.6 MG/DL (ref 7.7–10)
CALCIUM SPEC-SCNC: 8.7 MG/DL (ref 7.7–10)
CHLORIDE SERPL-SCNC: 111 MMOL/L (ref 99–112)
CHLORIDE SERPL-SCNC: 113 MMOL/L (ref 99–112)
CO2 SERPL-SCNC: 23.8 MMOL/L (ref 24.3–31.9)
CO2 SERPL-SCNC: 25.7 MMOL/L (ref 24.3–31.9)
CREAT BLD-MCNC: 0.45 MG/DL (ref 0.43–1.29)
CREAT BLD-MCNC: 0.45 MG/DL (ref 0.43–1.29)
CRP SERPL-MCNC: 5.68 MG/DL (ref 0–0.99)
DEPRECATED RDW RBC AUTO: 46 FL (ref 37–54)
EOSINOPHIL # BLD AUTO: 0.33 10*3/MM3 (ref 0–0.7)
EOSINOPHIL NFR BLD AUTO: 3.4 % (ref 0–5)
ERYTHROCYTE [DISTWIDTH] IN BLOOD BY AUTOMATED COUNT: 14.1 % (ref 11.5–14.5)
GFR SERPL CREATININE-BSD FRML MDRD: 141 ML/MIN/1.73
GFR SERPL CREATININE-BSD FRML MDRD: 141 ML/MIN/1.73
GLOBULIN UR ELPH-MCNC: 3.1 GM/DL
GLUCOSE BLD-MCNC: 118 MG/DL (ref 70–110)
GLUCOSE BLD-MCNC: 118 MG/DL (ref 70–110)
HCT VFR BLD AUTO: 39.9 % (ref 37–47)
HGB BLD-MCNC: 12.7 G/DL (ref 12–16)
IMM GRANULOCYTES # BLD: 0.01 10*3/MM3 (ref 0–0.03)
IMM GRANULOCYTES NFR BLD: 0.1 % (ref 0–0.5)
LYMPHOCYTES # BLD AUTO: 1.53 10*3/MM3 (ref 1–3)
LYMPHOCYTES NFR BLD AUTO: 15.8 % (ref 21–51)
MAGNESIUM SERPL-MCNC: 2.1 MG/DL (ref 1.7–2.6)
MCH RBC QN AUTO: 29.5 PG (ref 27–33)
MCHC RBC AUTO-ENTMCNC: 31.8 G/DL (ref 33–37)
MCV RBC AUTO: 92.8 FL (ref 80–94)
MONOCYTES # BLD AUTO: 0.45 10*3/MM3 (ref 0.1–0.9)
MONOCYTES NFR BLD AUTO: 4.7 % (ref 0–10)
NEUTROPHILS # BLD AUTO: 7.32 10*3/MM3 (ref 1.4–6.5)
NEUTROPHILS NFR BLD AUTO: 75.7 % (ref 30–70)
OSMOLALITY SERPL CALC.SUM OF ELEC: 281.8 MOSM/KG (ref 273–305)
OSMOLALITY SERPL CALC.SUM OF ELEC: 285.5 MOSM/KG (ref 273–305)
PHOSPHATE SERPL-MCNC: 3.3 MG/DL (ref 2.7–4.5)
PLATELET # BLD AUTO: 159 10*3/MM3 (ref 130–400)
PMV BLD AUTO: 12.3 FL (ref 6–10)
POTASSIUM BLD-SCNC: 3.6 MMOL/L (ref 3.5–5.3)
POTASSIUM BLD-SCNC: 3.8 MMOL/L (ref 3.5–5.3)
PROT SERPL-MCNC: 6.3 G/DL (ref 6–8)
RBC # BLD AUTO: 4.3 10*6/MM3 (ref 4.2–5.4)
SODIUM BLD-SCNC: 142 MMOL/L (ref 135–153)
SODIUM BLD-SCNC: 144 MMOL/L (ref 135–153)
WBC NRBC COR # BLD: 9.67 10*3/MM3 (ref 4.5–12.5)

## 2018-01-10 PROCEDURE — 83735 ASSAY OF MAGNESIUM: CPT | Performed by: INTERNAL MEDICINE

## 2018-01-10 PROCEDURE — 25010000002 VANCOMYCIN PER 500 MG: Performed by: INTERNAL MEDICINE

## 2018-01-10 PROCEDURE — 80048 BASIC METABOLIC PNL TOTAL CA: CPT | Performed by: INTERNAL MEDICINE

## 2018-01-10 PROCEDURE — 25010000002 PIPERACILLIN SOD-TAZOBACTAM PER 1 G: Performed by: INTERNAL MEDICINE

## 2018-01-10 PROCEDURE — 94799 UNLISTED PULMONARY SVC/PX: CPT

## 2018-01-10 PROCEDURE — 84100 ASSAY OF PHOSPHORUS: CPT | Performed by: INTERNAL MEDICINE

## 2018-01-10 PROCEDURE — 80053 COMPREHEN METABOLIC PANEL: CPT | Performed by: INTERNAL MEDICINE

## 2018-01-10 PROCEDURE — 99231 SBSQ HOSP IP/OBS SF/LOW 25: CPT | Performed by: INTERNAL MEDICINE

## 2018-01-10 PROCEDURE — 86140 C-REACTIVE PROTEIN: CPT | Performed by: INTERNAL MEDICINE

## 2018-01-10 PROCEDURE — 85025 COMPLETE CBC W/AUTO DIFF WBC: CPT | Performed by: INTERNAL MEDICINE

## 2018-01-10 PROCEDURE — 99233 SBSQ HOSP IP/OBS HIGH 50: CPT | Performed by: INTERNAL MEDICINE

## 2018-01-10 RX ORDER — L.ACID,PARA/B.BIFIDUM/S.THERM 8B CELL
1 CAPSULE ORAL DAILY
Status: DISCONTINUED | OUTPATIENT
Start: 2018-01-10 | End: 2018-01-17 | Stop reason: HOSPADM

## 2018-01-10 RX ORDER — POTASSIUM CHLORIDE 1.5 G/1.77G
40 POWDER, FOR SOLUTION ORAL EVERY 4 HOURS
Status: COMPLETED | OUTPATIENT
Start: 2018-01-10 | End: 2018-01-10

## 2018-01-10 RX ADMIN — PIPERACILLIN SODIUM,TAZOBACTAM SODIUM 3.38 G: 3; .375 INJECTION, POWDER, FOR SOLUTION INTRAVENOUS at 13:55

## 2018-01-10 RX ADMIN — LEVOTHYROXINE SODIUM 100 MCG: 100 TABLET ORAL at 05:52

## 2018-01-10 RX ADMIN — POTASSIUM & SODIUM PHOSPHATES POWDER PACK 280-160-250 MG 1 PACKET: 280-160-250 PACK at 16:46

## 2018-01-10 RX ADMIN — POTASSIUM CHLORIDE 40 MEQ: 1.5 POWDER, FOR SOLUTION ORAL at 12:49

## 2018-01-10 RX ADMIN — VANCOMYCIN HYDROCHLORIDE 750 MG: 1 INJECTION, POWDER, LYOPHILIZED, FOR SOLUTION INTRAVENOUS at 05:51

## 2018-01-10 RX ADMIN — POTASSIUM & SODIUM PHOSPHATES POWDER PACK 280-160-250 MG 1 PACKET: 280-160-250 PACK at 08:25

## 2018-01-10 RX ADMIN — ASPIRIN 81 MG: 81 TABLET, CHEWABLE ORAL at 09:27

## 2018-01-10 RX ADMIN — PIPERACILLIN SODIUM,TAZOBACTAM SODIUM 3.38 G: 3; .375 INJECTION, POWDER, FOR SOLUTION INTRAVENOUS at 14:12

## 2018-01-10 RX ADMIN — POTASSIUM CHLORIDE 40 MEQ: 1.5 POWDER, FOR SOLUTION ORAL at 08:24

## 2018-01-10 RX ADMIN — Medication 1 CAPSULE: at 12:43

## 2018-01-10 RX ADMIN — PIPERACILLIN SODIUM,TAZOBACTAM SODIUM 3.38 G: 3; .375 INJECTION, POWDER, FOR SOLUTION INTRAVENOUS at 05:51

## 2018-01-10 NOTE — PROGRESS NOTES
I have seen the patient in conjunction with Katia RN and       History of presenting illness:  Patient cannot give a history due to her medical condition.   is present.  He thinks she continues to look better.  Patient is tolerating tube feeds at goal rate    Vital Signs  Temp:  [97.7 °F (36.5 °C)-98.5 °F (36.9 °C)] 98.5 °F (36.9 °C)  Heart Rate:  [58-72] 61  Resp:  [20] 20  BP: (108-144)/(62-81) 108/79  Body mass index is 13.95 kg/(m^2).      Intake/Output Summary (Last 24 hours) at 01/10/18 1620  Last data filed at 01/10/18 1412   Gross per 24 hour   Intake              850 ml   Output                0 ml   Net              850 ml     Intake & Output (last 3 days)       01/07 0701 - 01/08 0700 01/08 0701 - 01/09 0700 01/09 0701 - 01/10 0700 01/10 0701 - 01/11 0700    P.O.   0     I.V. (mL/kg)  1000 (20.1) 160 (3.5)     Other  60 720     IV Piggyback  400 700 100    Total Intake(mL/kg)  1460 (29.4) 1580 (34.8) 100 (2.2)    Urine (mL/kg/hr)   0 (0)     Stool   0 (0)     Total Output     0      Net   +1460 +1580 +100            Unmeasured Urine Occurrence 1 x 8 x 8 x     Unmeasured Stool Occurrence 0 x 0 x            Physical exam:  Physical Exam   Constitutional: Well-developed, Thin female, She does move her hands and legs at times however no myoclonic jerks noted.  Color appears better.  Head: Normocephalic and atraumatic.  Mucous membranes more moist  Eyes:  Pupils are equal, round, no nystagmus noted today  Cardiovascular: Normal rate, regular rhythm and normal heart sounds.  No murmur rub or gallop  Pulmonary/Chest: Bilateral breath sounds overall clear today, FiO2 has been able to be weaned, current saturation 98%  Abdominal: Soft, flat, bowel sounds are active, nondistended nontender.  No peritoneal signs   Musculoskeletal: Cannot test strength, she has significant muscle wasting intraosseous in her hands   Neurological: No appropriate movement but she has some random movements more consistent  with her severe dementia  Skin: Skin is warm and dry.  the left hand streaks are improving      Telemetry: Sinus , inverted T-wave, reviewed    Results Review:  Lab Results   Component Value Date    WBC 9.67 01/10/2018    HGB 12.7 01/10/2018    HCT 39.9 01/10/2018    MCV 92.8 01/10/2018     01/10/2018     Lab Results   Component Value Date    GLUCOSE 118 (H) 01/10/2018    BUN 6 (L) 01/10/2018    CREATININE 0.45 01/10/2018    EGFRIFNONA 141 01/10/2018    EGFRIFAFRI 121 10/07/2016    BCR 13.3 01/10/2018    CO2 25.7 01/10/2018    CALCIUM 8.7 01/10/2018    ALBUMIN 3.20 (L) 01/10/2018    LABIL2 1.0 (L) 01/10/2018    AST 24 01/10/2018    ALT 25 01/10/2018       Imaging Results (last 72 hours)     Procedure Component Value Units Date/Time    XR Chest 1 View [[id number]] Collected:  01/07/18 2355     Updated:  01/07/18 0267    Narrative:       EXAMINATION: XR CHEST 1 VW-      CLINICAL INDICATION:     congestion, soa     TECHNIQUE:  XR CHEST 1 VW-      COMPARISON: NONE      FINDINGS:   Patchy right infrahilar opacities.   Heart and mediastinal contours are unremarkable.   No pneumothorax.   No pleural effusion.   No acute osseous findings.            Impression:       Right infrahilar airspace disease.     This report was finalized on 1/7/2018 11:55 PM by Dr. Rubén Vickers MD.           Yesterday x-ray showed improvement      Assessment/Plan     Active Problems:  Severe sepsis with hypoxic respiratory failure superimposed on right pneumonia probable aspiration. Cultures negative except for insignificant growth on her urine culture.  If present progress continues would consider de-escalating to Zosyn alone tomorrow.  Chest x-ray in a.m.Did CRP did take a trend upward, will follow this trend.  White count has normalized.      Myoclonic movements, resolved    Hypothyroidism, dose adjusted here    Nutrition, tolerating tube feeds at goal rate.    Alzheimer's disease, advanced     Hand vesicles, exact etiology is uncertain  healing    Hypernatremia, resolved IV fluids have been discontinued.    Hypokalemia, improved    Hypophosphatemia, improved    Abnormal EKG possible ischemia, on aspirin, no further intervention planned .    DVT prophylaxis, SCDs    I discussed with the patient's .  I've explained that I do not think she will get worse she can get adequate nutrition.  I've explained that in order for the patient to go back to the nursing facility she will need awake get adequate nutrition or be under hospice.  We will continue to support her and treat her pneumonia as well as used to feeds and fluids as needed.  Continue to wean oxygen, if she is unable to pass a swallowing evaluation in 3-4 days may need to consider permanent feeding tube.  Risks and benefits discussed.      Wil Felix MD  01/10/18  4:20 PM

## 2018-01-10 NOTE — PROGRESS NOTES
Nephrology Note        Subjective     No acute overnight events reported. She was still getting antibiotics in d5w    Objective     Vital Signs  Temp:  [97.7 °F (36.5 °C)-98.5 °F (36.9 °C)] 98.5 °F (36.9 °C)  Heart Rate:  [58-72] 61  Resp:  [20] 20  BP: (108-144)/(62-81) 108/79       I/O last 3 completed shifts:  In: 3040 [I.V.:1160; Other:780; IV Piggyback:1100]  Out: 0     Physical Examination:    General Appearance : non verbal  Eyes :  no pallor  Throat : oral mucosa dry  Neck:  no JVD  Lungs : clear to auscultation anteriorly  Heart : regular rhythm & normal rate, normal S1, S2, no murmur   Abdomen :  normal bowel sounds, soft non-tender  Extremities :   no edema  Neurologic : non verbal     Laboratory Data :      WBC WBC   Date Value Ref Range Status   01/10/2018 9.67 4.50 - 12.50 10*3/mm3 Final   01/08/2018 17.12 (H) 4.50 - 12.50 10*3/mm3 Final   01/07/2018 21.23 (H) 4.50 - 12.50 10*3/mm3 Final   01/07/2018 19.68 (H) 4.50 - 12.50 10*3/mm3 Final      HGB Hemoglobin   Date Value Ref Range Status   01/10/2018 12.7 12.0 - 16.0 g/dL Final   01/08/2018 13.2 12.0 - 16.0 g/dL Final   01/07/2018 15.2 12.0 - 16.0 g/dL Final   01/07/2018 15.6 12.0 - 16.0 g/dL Final      HCT Hematocrit   Date Value Ref Range Status   01/10/2018 39.9 37.0 - 47.0 % Final   01/08/2018 43.8 37.0 - 47.0 % Final   01/07/2018 48.5 (H) 37.0 - 47.0 % Final   01/07/2018 49.5 (H) 37.0 - 47.0 % Final      Platlets No results found for: LABPLAT   MCV MCV   Date Value Ref Range Status   01/10/2018 92.8 80.0 - 94.0 fL Final   01/08/2018 96.1 (H) 80.0 - 94.0 fL Final   01/07/2018 93.8 80.0 - 94.0 fL Final   01/07/2018 93.9 80.0 - 94.0 fL Final          Sodium Sodium   Date Value Ref Range Status   01/10/2018 144 135 - 153 mmol/L Final   01/10/2018 142 135 - 153 mmol/L Final   01/09/2018 144 135 - 153 mmol/L Final   01/09/2018 149 135 - 153 mmol/L Final   01/09/2018 149 135 - 153 mmol/L Final   01/09/2018 149 135 - 153 mmol/L Final   01/08/2018 155 (H)  135 - 153 mmol/L Final   01/08/2018 161 (C) 135 - 153 mmol/L Final   01/07/2018 162 (C) 135 - 153 mmol/L Final      Potassium Potassium   Date Value Ref Range Status   01/10/2018 3.8 3.5 - 5.3 mmol/L Final   01/10/2018 3.6 3.5 - 5.3 mmol/L Final   01/09/2018 3.9 3.5 - 5.3 mmol/L Final   01/09/2018 3.2 (L) 3.5 - 5.3 mmol/L Final   01/09/2018 3.4 (L) 3.5 - 5.3 mmol/L Final     Comment:     1+ Hemolysis    01/08/2018 3.3 (L) 3.5 - 5.3 mmol/L Final   01/07/2018 3.7 3.5 - 5.3 mmol/L Final      Chloride Chloride   Date Value Ref Range Status   01/10/2018 111 99 - 112 mmol/L Final   01/10/2018 113 (H) 99 - 112 mmol/L Final   01/09/2018 120 (H) 99 - 112 mmol/L Final   01/08/2018 131 (H) 99 - 112 mmol/L Final   01/07/2018 126 (H) 99 - 112 mmol/L Final      CO2 CO2   Date Value Ref Range Status   01/10/2018 25.7 24.3 - 31.9 mmol/L Final   01/10/2018 23.8 (L) 24.3 - 31.9 mmol/L Final   01/09/2018 25.7 24.3 - 31.9 mmol/L Final   01/08/2018 21.7 (L) 24.3 - 31.9 mmol/L Final   01/07/2018 24.2 (L) 24.3 - 31.9 mmol/L Final      BUN BUN   Date Value Ref Range Status   01/10/2018 6 (L) 7 - 21 mg/dL Final   01/10/2018 6 (L) 7 - 21 mg/dL Final   01/09/2018 10 7 - 21 mg/dL Final   01/08/2018 15 7 - 21 mg/dL Final   01/07/2018 20 7 - 21 mg/dL Final      Creatinine Creatinine   Date Value Ref Range Status   01/10/2018 0.45 0.43 - 1.29 mg/dL Final   01/10/2018 0.45 0.43 - 1.29 mg/dL Final   01/09/2018 0.48 0.43 - 1.29 mg/dL Final   01/08/2018 0.63 0.43 - 1.29 mg/dL Final   01/07/2018 0.81 0.43 - 1.29 mg/dL Final      Calcium Calcium   Date Value Ref Range Status   01/10/2018 8.7 7.7 - 10.0 mg/dL Final   01/10/2018 8.6 7.7 - 10.0 mg/dL Final   01/09/2018 8.0 7.7 - 10.0 mg/dL Final   01/08/2018 7.8 7.7 - 10.0 mg/dL Final   01/07/2018 9.0 7.7 - 10.0 mg/dL Final      PO4 No results found for: CAPO4   Albumin Albumin   Date Value Ref Range Status   01/10/2018 3.20 (L) 3.40 - 4.80 g/dL Final   01/08/2018 3.20 (L) 3.40 - 4.80 g/dL Final    01/07/2018 3.80 3.40 - 4.80 g/dL Final      Magnesium Magnesium   Date Value Ref Range Status   01/10/2018 2.1 1.7 - 2.6 mg/dL Final   01/09/2018 2.1 1.7 - 2.6 mg/dL Final   01/08/2018 2.2 1.7 - 2.6 mg/dL Final      Uric Acid No results found for: URICACID     Radiology results :     Imaging Results (last 72 hours)     Procedure Component Value Units Date/Time    XR Chest 1 View [[id number]] Collected:  01/07/18 2355     Updated:  01/07/18 2357    Narrative:       EXAMINATION: XR CHEST 1 VW-      CLINICAL INDICATION:     congestion, soa     TECHNIQUE:  XR CHEST 1 VW-      COMPARISON: NONE      FINDINGS:   Patchy right infrahilar opacities.   Heart and mediastinal contours are unremarkable.   No pneumothorax.   No pleural effusion.   No acute osseous findings.            Impression:       Right infrahilar airspace disease.     This report was finalized on 1/7/2018 11:55 PM by Dr. Rubén Vickers MD.           Imaging Results (last 24 hours)     ** No results found for the last 24 hours. **              Medications:        aspirin 81 mg Oral Daily   lactobacillus acidophilus 1 capsule Per G Tube Daily   levothyroxine 100 mcg Nasogastric Q AM   IVPB builder 3.375 g Intravenous Q8H   potassium & sodium phosphates 1 packet Oral BID AC   potassium chloride 40 mEq Oral Q4H   IVPB builder 750 mg Intravenous Q18H          Assessment/Plan     Active Problems:    Aspiration pneumonia of right lower lobe      1. Hypernatremia :  Sodium better at 144 today. IVF have been stopped. Watch on tube feedings   D/w RN to mix antibiotics in NS    2. Hypokalemia and hypophosphatemia : resolved    3. alzheimer's dementia    4. Aspiration pneumonia    Prognosis is poor  I discussed the patients findings and my recommendations with RN    Rafi Kidd MD  01/10/18  2:00 PM

## 2018-01-10 NOTE — PROGRESS NOTES
LOS: 2 days     Chief Complaint:  Pulmonology is following for respiratory failure    Subjective     Interval History: Ms. Eden is resting in bed.  No changes noted.    History taken from: chart RN    Review of Systems:     Review of Systems - History obtained from unobtainable from patient due to mental status                      Objective     Vital Signs  Temp:  [97.7 °F (36.5 °C)-99.2 °F (37.3 °C)] 98.1 °F (36.7 °C)  Heart Rate:  [58-83] 58  Resp:  [20] 20  BP: (116-144)/(62-84) 122/62  Body mass index is 13.95 kg/(m^2).    Intake/Output Summary (Last 24 hours) at 01/10/18 1216  Last data filed at 01/10/18 0551   Gross per 24 hour   Intake             1000 ml   Output                0 ml   Net             1000 ml          Physical Exam:  GENERAL APPEARANCE: frail, chronically ill appearing.    HEAD: normocephalic.    EYES: PERRL    NECK: Neck supple.     CARDIAC: Normal S1 and S2. No S3, S4 or murmurs. Rhythm is regular. There is no peripheral edema, cyanosis or pallor. Extremities are warm and well perfused. Capillary refill is less than 2 seconds. No carotid bruits.    RESPIRATORY: Clear to auscultation and percussion without rales, rhonchi, wheezing or diminished breath sounds.    GI: Positive bowel sounds. Soft, nondistended, nontender.     MUSCULOSKELTAL: No significant deformity or joint abnormality. No edema. Peripheral pulses intact.     NEUROLOGICAL: Unable to assess due to mental status.    PSYCHIATRIC: Unable to assess due to mental status.    Results Review:                I reviewed the patient's new clinical results.  I reviewed the patient's new imaging results and agree with the interpretation.    Results from last 7 days  Lab Units 01/10/18  0057 01/08/18  0231 01/07/18  2119   WBC 10*3/mm3 9.67 17.12* 19.68*  21.23*   HEMOGLOBIN g/dL 12.7 13.2 15.6  15.2   PLATELETS 10*3/mm3 159 208 283  296       Results from last 7 days  Lab Units 01/10/18  0713 01/10/18  0057 01/09/18  1617  01/09/18  0713 01/09/18  0522  01/08/18  0231   SODIUM mmol/L 144 142 144 149  149  --   < > 161*   POTASSIUM mmol/L 3.8 3.6 3.9 3.2* 3.4*  --  3.3*   CHLORIDE mmol/L 111 113*  --  120*  --   --  131*   CO2 mmol/L 25.7 23.8*  --  25.7  --   --  21.7*   BUN mg/dL 6* 6*  --  10  --   --  15   CREATININE mg/dL 0.45 0.45  --  0.48  --   --  0.63   CALCIUM mg/dL 8.7 8.6  --  8.0  --   --  7.8   GLUCOSE mg/dL 118* 118*  --  133*  --   --  154*   MAGNESIUM mg/dL  --  2.1  --   --  2.1  --  2.2   < > = values in this interval not displayed.  No results found for: INR, PROTIME    Results from last 7 days  Lab Units 01/10/18  0057 01/08/18  0231 01/07/18  2119   ALK PHOS U/L 66 66 82   BILIRUBIN mg/dL 1.0 0.7 0.6   ALT (SGPT) U/L 25 35 42*   AST (SGOT) U/L 24 29 35*       Results from last 7 days  Lab Units 01/08/18  1225   PH, ARTERIAL pH units 7.508*   PO2 ART mm Hg 155.0*   PCO2, ARTERIAL mm Hg 29.4*   HCO3 ART mmol/L 22.8     Imaging Results (last 24 hours)     ** No results found for the last 24 hours. **             Medication Review:   Scheduled Medications:    aspirin 81 mg Oral Daily   lactobacillus acidophilus 1 capsule Per G Tube Daily   levothyroxine 100 mcg Nasogastric Q AM   IVPB builder 3.375 g Intravenous Q8H   potassium & sodium phosphates 1 packet Oral BID AC   potassium chloride 40 mEq Oral Q4H   IVPB builder 750 mg Intravenous Q18H     Continuous infusions:       Assessment/Plan      Pneumonia:  Likely related to aspiration.    Patient remains on high flow oxygen.    Continue scheduled inhalants and PRN neb treatments.    Patient is a DNR/DNI, palliative care is following.    Pulmonary and critical care will sign off of case.  Please call with any further questions or concerns.      Patient Active Problem List   Diagnosis Code   • Aspiration pneumonia of right lower lobe J69.0             Nely Ball, APRN  01/10/18  12:16 PM        Attestation: Scribed for Dr. Lantigua, by Nely Ball,  JOANA DUARTE, Jef Lantigua M.D. attest that the above note accurately reflects the work and decisions made  by me.  Patient was seen and evaluated by Dr. Lantigua, including history of present illness, physical exam, assessment, and treatment plan.  The above note was reviewed and edited by Dr. Lantigua.

## 2018-01-10 NOTE — PLAN OF CARE
Problem: Skin Integrity Impairment, Risk/Actual (Adult)  Goal: Identify Related Risk Factors and Signs and Symptoms  Outcome: Ongoing (interventions implemented as appropriate)    Goal: Skin Integrity/Wound Healing  Outcome: Ongoing (interventions implemented as appropriate)    Goal: Identify Related Risk Factors and Signs and Symptoms  Outcome: Ongoing (interventions implemented as appropriate)      Problem: Fall Risk (Adult)  Goal: Identify Related Risk Factors and Signs and Symptoms  Outcome: Ongoing (interventions implemented as appropriate)    Goal: Absence of Falls  Outcome: Ongoing (interventions implemented as appropriate)

## 2018-01-10 NOTE — PROGRESS NOTES
Discharge Planning Assessment  WILL Alfaro     Patient Name: Baldo Eden  MRN: [id number]  Today's Date: 1/10/2018    Admit Date: 1/7/2018       Discharge Plan       01/10/18 1009    Case Management/Social Work Plan    Plan Pt was admitted from [facility] and had a 14 day skilled bed hold upon admission. SS to follow.         Discharge Placement     Facility/Agency Request Status Selected? Address Phone Number Fax Number    [facility] Pending - No Request Sent     [address] [place] [address] [phone]            Lu Mayo

## 2018-01-10 NOTE — PROGRESS NOTES
"Palliative Care Daily Progress Note     S: Medical record reviewed, followed up with pt, pt's , SW regarding patient's condition. Events noted. Pt remains minimally responsive. Tolerating NGT and TFs.  at bedside. See below for discussion.       O:   Palliative Performance Scale Score:     /78 (BP Location: Right arm, Patient Position: Lying)  Pulse 67  Temp 97.8 °F (36.6 °C) (Oral)   Resp 20  Ht 180.3 cm (71\")  Wt 45.4 kg (100 lb 0.3 oz)  SpO2 99%  BMI 13.95 kg/m2    Intake/Output Summary (Last 24 hours) at 01/10/18 6489  Last data filed at 01/10/18 1412   Gross per 24 hour   Intake              450 ml   Output                0 ml   Net              450 ml       PE:  General Appearance:    Chronically ill appearing, minimally responsive, NAD   HEENT:    NC/AT, without obvious abnormality, EOMI, anicteric, poor dentition, NGT in place    Neck:   Hyperextended and stiff, trachea midline, no JVD   Lungs:     CTAB without w/r/r    Heart:    RRR, normal S1 and S2, no M/R/G   Abdomen:     Soft, NT, ND, NABS    Extremities:   Moves all extremities spontaneously, no edema   Pulses:   Pulses palpable and equal bilaterally   Skin:   Warm, dry   Neurologic:   Alert at times but nonverbal, cooperative   Psych:   Calm, appropriate           Meds: Reviewed and changes noted    Labs:     Results from last 7 days  Lab Units 01/10/18  0057   WBC 10*3/mm3 9.67   HEMOGLOBIN g/dL 12.7   HEMATOCRIT % 39.9   PLATELETS 10*3/mm3 159       Results from last 7 days  Lab Units 01/10/18  0713   SODIUM mmol/L 144   POTASSIUM mmol/L 3.8   CHLORIDE mmol/L 111   CO2 mmol/L 25.7   BUN mg/dL 6*   CREATININE mg/dL 0.45   GLUCOSE mg/dL 118*   CALCIUM mg/dL 8.7       Results from last 7 days  Lab Units 01/10/18  0713 01/10/18  0057   SODIUM mmol/L 144 142   POTASSIUM mmol/L 3.8 3.6   CHLORIDE mmol/L 111 113*   CO2 mmol/L 25.7 23.8*   BUN mg/dL 6* 6*   CREATININE mg/dL 0.45 0.45   CALCIUM mg/dL 8.7 8.6   BILIRUBIN mg/dL  --  " 1.0   ALK PHOS U/L  --  66   ALT (SGPT) U/L  --  25   AST (SGOT) U/L  --  24   GLUCOSE mg/dL 118* 118*     Imaging Results (last 72 hours)     Procedure Component Value Units Date/Time    XR Chest 1 View [[id number]] Collected:  01/07/18 2355     Updated:  01/07/18 2357    Narrative:       EXAMINATION: XR CHEST 1 VW-      CLINICAL INDICATION:     congestion, soa     TECHNIQUE:  XR CHEST 1 VW-      COMPARISON: NONE      FINDINGS:   Patchy right infrahilar opacities.   Heart and mediastinal contours are unremarkable.   No pneumothorax.   No pleural effusion.   No acute osseous findings.            Impression:       Right infrahilar airspace disease.     This report was finalized on 1/7/2018 11:55 PM by Dr. Rubén Vickers MD.       XR Chest 1 View [[id number]] Collected:  01/09/18 0642     Updated:  01/09/18 0646    Narrative:       EXAMINATION: XR CHEST 1 VW-      CLINICAL INDICATION:     to verify NG tube placement; J69.0-Pneumonitis  due to inhalation of food and vomit; A41.9-Sepsis, unspecified organism     TECHNIQUE:  XR CHEST 1 VW-      COMPARISON: 1/7/2018      FINDINGS:   NG tube extends below diaphragm presumably into stomach.   Left basilar atelectasis.   Potential or congestion.   Right infrahilar opacities improved.   No effusion or pneumothorax.            Impression:       1. Interval placement of NG tube.  2. Improvement in right basilar airspace disease with increasing left  basilar atelectasis. Pulmonary vascular congestion noted.     This report was finalized on 1/9/2018 6:43 AM by Dr. Rubén Vickers MD.       XR Chest 1 View [[id number]] Collected:  01/09/18 0643     Updated:  01/09/18 0646    Narrative:       EXAMINATION: XR CHEST 1 VW-      CLINICAL INDICATION:     NG Tube Placement; J69.0-Pneumonitis due to  inhalation of food and vomit; A41.9-Sepsis, unspecified organism     TECHNIQUE:  XR CHEST 1 VW-      COMPARISON: 1/8/2018      FINDINGS:   Stable NG tube positioning. Basilar airspace disease  improved since  previous. Chest otherwise stable. No pneumothorax.       Impression:       Stable NG tube positioning. Improvement in basilar  opacities.     This report was finalized on 2018 6:44 AM by Dr. Rubén Vickers MD.               Diagnostics: Reviewed    A: Baldo Eden is a 62 y.o. yo female with RLL aspiration PNA, early onset Alzheimer's dementia      P:  Pt appears comfortable at present.  reports she does seem to be in pain at times when she grinds her teeth, but that tylenol or ibuprofen have been working for that.     Long discussion at bedside regarding GOC.  verbalizes that pt is not to be resuscitated or intubated. He is ok with the HFNC currently in place and BiPAP/CPAP if necessary. Code status has been updated to reflect this.      continues to struggle with deciding about long term feeding tube placement. He verbalizes that he knows that without adequate nutrition and hydration that the pt will not be able to survive. He reports that he isn't ready to give her up yet. He also verbalizes concerns about some of the risks involved, such as infection or continued aspiration, as pt's dad just  not longer after having one placed due to both infection at the insertion site and re-aspiration. He also feels like TFs do not provide the pleasure of the taste of food and that as her mental capacities are not going to improve, that it is only prolonging her suffering. However, he is not ready to decide definitely whether he would want to pursue PEG placement or not. He also reports that he was told a long time ago that pt needed a modified diet, and she lost weight on it and did horribly, but that once he started feeding her a regular diet, she was able to walk with him instead of being bedbound. We discussed that even with TFs, that pt will not regain ambulatory status based on the severity of her disease.     Regardless of PEG placement, pt may need to transition back to  the NH with hospice services. She can have hospice services with artifical nutrition in place. This was not discussed with the  today.     I had a 34 minute discussion face-to-face with the patient or healthcare surrogate regarding patient's medical condition, disease trajectory and prognosis, medications and side effects used for symptom management, and goals of care, including all level of care options and advanced care planning. An MOST form was not completed today, but would encourage one prior to discharge.     We will continue to follow along. Please do not hesitate to contact us regarding further sx mgmt or GOC needs, including after hours or on weekends via our on call provider at [phone].     Debora Izquierdo MD    1/10/2018

## 2018-01-10 NOTE — PLAN OF CARE
Problem: Patient Care Overview (Adult)  Goal: Plan of Care Review  Outcome: Ongoing (interventions implemented as appropriate)      Problem: Skin Integrity Impairment, Risk/Actual (Adult)  Goal: Identify Related Risk Factors and Signs and Symptoms  Outcome: Ongoing (interventions implemented as appropriate)    Goal: Skin Integrity/Wound Healing  Outcome: Ongoing (interventions implemented as appropriate)      Problem: Sepsis (Adult)  Goal: Signs and Symptoms of Listed Potential Problems Will be Absent or Manageable (Sepsis)  Outcome: Ongoing (interventions implemented as appropriate)      Problem: Fall Risk (Adult)  Goal: Identify Related Risk Factors and Signs and Symptoms  Outcome: Ongoing (interventions implemented as appropriate)    Goal: Absence of Falls  Outcome: Ongoing (interventions implemented as appropriate)      Problem: Infection, Risk/Actual (Adult)  Goal: Identify Related Risk Factors and Signs and Symptoms  Outcome: Ongoing (interventions implemented as appropriate)    Goal: Infection Prevention/Resolution  Outcome: Ongoing (interventions implemented as appropriate)      Problem: Pressure Ulcer (Adult)  Goal: Signs and Symptoms of Listed Potential Problems Will be Absent or Manageable (Pressure Ulcer)  Outcome: Ongoing (interventions implemented as appropriate)      Problem: Mobility, Physical Impaired (Adult)  Goal: Identify Related Risk Factors and Signs and Symptoms  Outcome: Ongoing (interventions implemented as appropriate)    Goal: Enhanced Mobility Skills  Outcome: Ongoing (interventions implemented as appropriate)    Goal: Enhanced Functionality Ability  Outcome: Ongoing (interventions implemented as appropriate)      Problem: Respiratory Insufficiency (Adult)  Goal: Identify Related Risk Factors and Signs and Symptoms  Outcome: Ongoing (interventions implemented as appropriate)    Goal: Acid/Base Balance  Outcome: Ongoing (interventions implemented as appropriate)    Goal: Effective  Ventilation  Outcome: Ongoing (interventions implemented as appropriate)

## 2018-01-11 ENCOUNTER — APPOINTMENT (OUTPATIENT)
Dept: GENERAL RADIOLOGY | Facility: HOSPITAL | Age: 63
End: 2018-01-11

## 2018-01-11 LAB
ANION GAP SERPL CALCULATED.3IONS-SCNC: 5.7 MMOL/L (ref 3.6–11.2)
BACTERIA SPEC AEROBE CULT: ABNORMAL
BASOPHILS # BLD AUTO: 0.03 10*3/MM3 (ref 0–0.3)
BASOPHILS NFR BLD AUTO: 0.3 % (ref 0–2)
BUN BLD-MCNC: 6 MG/DL (ref 7–21)
BUN/CREAT SERPL: 13.3 (ref 7–25)
CALCIUM SPEC-SCNC: 8.7 MG/DL (ref 7.7–10)
CHLORIDE SERPL-SCNC: 111 MMOL/L (ref 99–112)
CO2 SERPL-SCNC: 24.3 MMOL/L (ref 24.3–31.9)
CREAT BLD-MCNC: 0.45 MG/DL (ref 0.43–1.29)
DEPRECATED RDW RBC AUTO: 45.2 FL (ref 37–54)
EOSINOPHIL # BLD AUTO: 0.52 10*3/MM3 (ref 0–0.7)
EOSINOPHIL NFR BLD AUTO: 6 % (ref 0–5)
ERYTHROCYTE [DISTWIDTH] IN BLOOD BY AUTOMATED COUNT: 14.1 % (ref 11.5–14.5)
GFR SERPL CREATININE-BSD FRML MDRD: 141 ML/MIN/1.73
GLUCOSE BLD-MCNC: 121 MG/DL (ref 70–110)
HCT VFR BLD AUTO: 41.3 % (ref 37–47)
HGB BLD-MCNC: 13.4 G/DL (ref 12–16)
IMM GRANULOCYTES # BLD: 0.02 10*3/MM3 (ref 0–0.03)
IMM GRANULOCYTES NFR BLD: 0.2 % (ref 0–0.5)
LYMPHOCYTES # BLD AUTO: 1.46 10*3/MM3 (ref 1–3)
LYMPHOCYTES NFR BLD AUTO: 16.8 % (ref 21–51)
MAGNESIUM SERPL-MCNC: 2.3 MG/DL (ref 1.7–2.6)
MCH RBC QN AUTO: 29.6 PG (ref 27–33)
MCHC RBC AUTO-ENTMCNC: 32.4 G/DL (ref 33–37)
MCV RBC AUTO: 91.2 FL (ref 80–94)
MONOCYTES # BLD AUTO: 0.71 10*3/MM3 (ref 0.1–0.9)
MONOCYTES NFR BLD AUTO: 8.2 % (ref 0–10)
NEUTROPHILS # BLD AUTO: 5.93 10*3/MM3 (ref 1.4–6.5)
NEUTROPHILS NFR BLD AUTO: 68.5 % (ref 30–70)
OSMOLALITY SERPL CALC.SUM OF ELEC: 280.1 MOSM/KG (ref 273–305)
PHOSPHATE SERPL-MCNC: 3.4 MG/DL (ref 2.7–4.5)
PLATELET # BLD AUTO: 190 10*3/MM3 (ref 130–400)
PMV BLD AUTO: 12.1 FL (ref 6–10)
POTASSIUM BLD-SCNC: 3.8 MMOL/L (ref 3.5–5.3)
RBC # BLD AUTO: 4.53 10*6/MM3 (ref 4.2–5.4)
SODIUM BLD-SCNC: 141 MMOL/L (ref 135–153)
WBC NRBC COR # BLD: 8.67 10*3/MM3 (ref 4.5–12.5)

## 2018-01-11 PROCEDURE — 94799 UNLISTED PULMONARY SVC/PX: CPT

## 2018-01-11 PROCEDURE — 85025 COMPLETE CBC W/AUTO DIFF WBC: CPT | Performed by: INTERNAL MEDICINE

## 2018-01-11 PROCEDURE — 83735 ASSAY OF MAGNESIUM: CPT | Performed by: INTERNAL MEDICINE

## 2018-01-11 PROCEDURE — 25010000002 PIPERACILLIN-TAZOBACTAM: Performed by: INTERNAL MEDICINE

## 2018-01-11 PROCEDURE — 71045 X-RAY EXAM CHEST 1 VIEW: CPT | Performed by: RADIOLOGY

## 2018-01-11 PROCEDURE — 84100 ASSAY OF PHOSPHORUS: CPT | Performed by: INTERNAL MEDICINE

## 2018-01-11 PROCEDURE — 25010000002 VANCOMYCIN PER 500 MG: Performed by: INTERNAL MEDICINE

## 2018-01-11 PROCEDURE — 80048 BASIC METABOLIC PNL TOTAL CA: CPT | Performed by: INTERNAL MEDICINE

## 2018-01-11 PROCEDURE — 99232 SBSQ HOSP IP/OBS MODERATE 35: CPT | Performed by: INTERNAL MEDICINE

## 2018-01-11 PROCEDURE — 71045 X-RAY EXAM CHEST 1 VIEW: CPT

## 2018-01-11 RX ADMIN — POTASSIUM & SODIUM PHOSPHATES POWDER PACK 280-160-250 MG 1 PACKET: 280-160-250 PACK at 05:22

## 2018-01-11 RX ADMIN — VANCOMYCIN HYDROCHLORIDE 750 MG: 5 INJECTION, POWDER, LYOPHILIZED, FOR SOLUTION INTRAVENOUS at 02:09

## 2018-01-11 RX ADMIN — LEVOTHYROXINE SODIUM 100 MCG: 100 TABLET ORAL at 05:22

## 2018-01-11 RX ADMIN — PIPERACILLIN SODIUM,TAZOBACTAM SODIUM 3.38 G: 3; .375 INJECTION, POWDER, FOR SOLUTION INTRAVENOUS at 20:35

## 2018-01-11 RX ADMIN — Medication 1 CAPSULE: at 09:05

## 2018-01-11 RX ADMIN — POTASSIUM CHLORIDE 40 MEQ: 1.5 POWDER, FOR SOLUTION ORAL at 20:35

## 2018-01-11 RX ADMIN — ASPIRIN 81 MG: 81 TABLET, CHEWABLE ORAL at 09:05

## 2018-01-11 NOTE — PLAN OF CARE
Problem: Patient Care Overview (Adult)  Goal: Plan of Care Review  Outcome: Ongoing (interventions implemented as appropriate)   01/08/18 0240 01/10/18 2100   Coping/Psychosocial Response Interventions   Plan Of Care Reviewed With --  patient   Patient Care Overview   Progress progress toward functional goals as expected --

## 2018-01-11 NOTE — PROGRESS NOTES
I have seen the patient in conjunction with Katia RN and       History of presenting illness:  Patient cannot give a history due to her medical condition.   is present.  No significant change overnight, she is tolerating her tube feeds at goal rate    Vital Signs  Temp:  [97.1 °F (36.2 °C)-100.4 °F (38 °C)] 98.4 °F (36.9 °C)  Heart Rate:  [61-73] 64  Resp:  [22] 22  BP: ()/(59-78) 99/59  Body mass index is 15.37 kg/(m^2).    No intake or output data in the 24 hours ending 01/11/18 1704  Intake & Output (last 3 days)       01/08 0701 - 01/09 0700 01/09 0701 - 01/10 0700 01/10 0701 - 01/11 0700 01/11 0701 - 01/12 0700    P.O.  0      I.V. (mL/kg) 1000 (20.1) 160 (3.5)      Other 60 720      IV Piggyback 400 700 100     Total Intake(mL/kg) 1460 (29.4) 1580 (34.8) 100 (2)     Urine (mL/kg/hr)  0 (0)      Stool  0 (0)      Total Output   0        Net +1460 +1580 +100              Unmeasured Urine Occurrence 8 x 8 x 9 x 1 x    Unmeasured Stool Occurrence 0 x  1 x           Physical exam:  Physical Exam   Constitutional: Well-developed, Thin female, She does move her hands and legs at times however no myoclonic jerks noted.  Color appears better.  Head: Normocephalic and atraumatic.  Mucous membranes more moist  Eyes:  Pupils are equal, round, no nystagmus noted today  Cardiovascular: Normal rate, regular rhythm and normal heart sounds.  No murmur rub or gallop  Pulmonary/Chest: Bilateral breath sounds overall clear  Now on nasal cannula and saturations are good.  Abdominal: Soft, flat, bowel sounds are active, nondistended nontender.  No peritoneal signs   Musculoskeletal: Cannot test strength  Neurological: No myoclonic movements noted, she appears comfortable  Skin: Skin is warm and dry.  the left hand streaking and vesicles continuing to improve    Telemetry: Sinus , inverted T-wave, reviewed    Results Review:  Lab Results   Component Value Date    WBC 8.67 01/11/2018    HGB 13.4 01/11/2018    HCT  41.3 01/11/2018    MCV 91.2 01/11/2018     01/11/2018     Lab Results   Component Value Date    GLUCOSE 121 (H) 01/11/2018    BUN 6 (L) 01/11/2018    CREATININE 0.45 01/11/2018    EGFRIFNONA 141 01/11/2018    EGFRIFAFRI 121 10/07/2016    BCR 13.3 01/11/2018    CO2 24.3 01/11/2018    CALCIUM 8.7 01/11/2018    ALBUMIN 3.20 (L) 01/10/2018    LABIL2 1.0 (L) 01/10/2018    AST 24 01/10/2018    ALT 25 01/10/2018       Imaging Results (last 72 hours)     Procedure Component Value Units Date/Time    XR Chest 1 View [[id number]] Collected:  01/07/18 2355     Updated:  01/07/18 2357    Narrative:       EXAMINATION: XR CHEST 1 VW-      CLINICAL INDICATION:     congestion, soa     TECHNIQUE:  XR CHEST 1 VW-      COMPARISON: NONE      FINDINGS:   Patchy right infrahilar opacities.   Heart and mediastinal contours are unremarkable.   No pneumothorax.   No pleural effusion.   No acute osseous findings.            Impression:       Right infrahilar airspace disease.     This report was finalized on 1/7/2018 11:55 PM by Dr. Rubén Vickers MD.           Chest x-ray reviewed, still some right airspace disease.      Assessment/Plan     Active Problems:  Severe sepsis with hypoxic respiratory failure superimposed on right pneumonia probable aspiration. White count has come down, recheck CRP in the a.m.  continue Zosyn pending, if current progress continues considered discontinue vancomycin tomorrow.    Myoclonic movements, remain resolved    Hypothyroidism, dose adjusted here    Nutrition, tolerating tube feeds at goal rate.    Alzheimer's disease, advanced     Hand vesicles, continuing to heal    Hypernatremia, resolved    Abnormal EKG possible ischemia, on aspirin, no further intervention planned .  Does not appear to be in any discomfort    DVT prophylaxis, SCDs    Long-term goals still being determined,  is going to discuss with family, will allow the patient tube feedings through NG tube with weekend, if present  progress continues would ask speech therapy to reevaluate her at that time.  If she still has to be held nothing by mouth because of risk of aspiration then decision about a PEG tube would have to be made at that time.   understands.      Wil Felix MD  01/11/18  5:04 PM

## 2018-01-11 NOTE — PROGRESS NOTES
Nephrology Note        Subjective     No acute overnight events reported  No distres    Objective     Vital Signs  Temp:  [97.1 °F (36.2 °C)-100.4 °F (38 °C)] 98.4 °F (36.9 °C)  Heart Rate:  [61-73] 64  Resp:  [20-22] 22  BP: ()/(59-78) 99/59       I/O last 3 completed shifts:  In: 450 [IV Piggyback:450]  Out: 0     Physical Examination:    General Appearance : non verbal  Eyes :  no pallor  Throat : oral mucosa moist  Neck:  no JVD  Lungs : clear to auscultation anteriorly  Heart : regular rhythm & normal rate, normal S1, S2, no murmur   Abdomen :  normal bowel sounds, soft non-tender  Extremities :   no edema  Neurologic : non verbal     Laboratory Data :      WBC WBC   Date Value Ref Range Status   01/11/2018 8.67 4.50 - 12.50 10*3/mm3 Final   01/10/2018 9.67 4.50 - 12.50 10*3/mm3 Final      HGB Hemoglobin   Date Value Ref Range Status   01/11/2018 13.4 12.0 - 16.0 g/dL Final   01/10/2018 12.7 12.0 - 16.0 g/dL Final      HCT Hematocrit   Date Value Ref Range Status   01/11/2018 41.3 37.0 - 47.0 % Final   01/10/2018 39.9 37.0 - 47.0 % Final      Platlets No results found for: LABPLAT   MCV MCV   Date Value Ref Range Status   01/11/2018 91.2 80.0 - 94.0 fL Final   01/10/2018 92.8 80.0 - 94.0 fL Final          Sodium Sodium   Date Value Ref Range Status   01/11/2018 141 135 - 153 mmol/L Final   01/10/2018 144 135 - 153 mmol/L Final   01/10/2018 142 135 - 153 mmol/L Final   01/09/2018 144 135 - 153 mmol/L Final   01/09/2018 149 135 - 153 mmol/L Final   01/09/2018 149 135 - 153 mmol/L Final   01/09/2018 149 135 - 153 mmol/L Final   01/08/2018 155 (H) 135 - 153 mmol/L Final      Potassium Potassium   Date Value Ref Range Status   01/11/2018 3.8 3.5 - 5.3 mmol/L Final   01/10/2018 3.8 3.5 - 5.3 mmol/L Final   01/10/2018 3.6 3.5 - 5.3 mmol/L Final   01/09/2018 3.9 3.5 - 5.3 mmol/L Final   01/09/2018 3.2 (L) 3.5 - 5.3 mmol/L Final   01/09/2018 3.4 (L) 3.5 - 5.3 mmol/L Final     Comment:     1+ Hemolysis        Chloride Chloride   Date Value Ref Range Status   01/11/2018 111 99 - 112 mmol/L Final   01/10/2018 111 99 - 112 mmol/L Final   01/10/2018 113 (H) 99 - 112 mmol/L Final   01/09/2018 120 (H) 99 - 112 mmol/L Final      CO2 CO2   Date Value Ref Range Status   01/11/2018 24.3 24.3 - 31.9 mmol/L Final   01/10/2018 25.7 24.3 - 31.9 mmol/L Final   01/10/2018 23.8 (L) 24.3 - 31.9 mmol/L Final   01/09/2018 25.7 24.3 - 31.9 mmol/L Final      BUN BUN   Date Value Ref Range Status   01/11/2018 6 (L) 7 - 21 mg/dL Final   01/10/2018 6 (L) 7 - 21 mg/dL Final   01/10/2018 6 (L) 7 - 21 mg/dL Final   01/09/2018 10 7 - 21 mg/dL Final      Creatinine Creatinine   Date Value Ref Range Status   01/11/2018 0.45 0.43 - 1.29 mg/dL Final   01/10/2018 0.45 0.43 - 1.29 mg/dL Final   01/10/2018 0.45 0.43 - 1.29 mg/dL Final   01/09/2018 0.48 0.43 - 1.29 mg/dL Final      Calcium Calcium   Date Value Ref Range Status   01/11/2018 8.7 7.7 - 10.0 mg/dL Final   01/10/2018 8.7 7.7 - 10.0 mg/dL Final   01/10/2018 8.6 7.7 - 10.0 mg/dL Final   01/09/2018 8.0 7.7 - 10.0 mg/dL Final      PO4 No results found for: CAPO4   Albumin Albumin   Date Value Ref Range Status   01/10/2018 3.20 (L) 3.40 - 4.80 g/dL Final      Magnesium Magnesium   Date Value Ref Range Status   01/11/2018 2.3 1.7 - 2.6 mg/dL Final   01/10/2018 2.1 1.7 - 2.6 mg/dL Final   01/09/2018 2.1 1.7 - 2.6 mg/dL Final      Uric Acid No results found for: URICACID     Radiology results :   Imaging Results (last 24 hours)     Procedure Component Value Units Date/Time    XR Chest AP [[id number]] Collected:  01/11/18 0536     Updated:  01/11/18 0539    Narrative:       EXAMINATION: XR CHEST AP-      CLINICAL INDICATION:     Follow up Pneumonia; J69.0-Pneumonitis due to  inhalation of food and vomit; A41.9-Sepsis, unspecified organism     TECHNIQUE:  XR CHEST AP-      COMPARISON: 01/09/2018      FINDINGS:   Bilateral interstitial opacities stable with more focal opacities right  lung base now noted.  NG tube extends into stomach. No effusion or  pneumothorax.       Impression:       Developing right basilar airspace disease. Stable  interstitial thickening. NG tube with tip in stomach.     This report was finalized on 1/11/2018 5:37 AM by Dr. Rubén Vickers MD.                 Medications:        aspirin 81 mg Oral Daily   lactobacillus acidophilus 1 capsule Per G Tube Daily   levothyroxine 100 mcg Nasogastric Q AM   piperacillin-tazobactam 3.375 g Intravenous Q8H   potassium & sodium phosphates 1 packet Oral BID AC          Assessment/Plan     Active Problems:    Aspiration pneumonia of right lower lobe      1. Hypernatremia :  resolved    2. Hypokalemia and hypophosphatemia : resolved    3. alzheimer's dementia    4. Aspiration pneumonia    Will sign off  I discussed the patients findings and my recommendations with SHOLA Kidd MD  01/11/18  2:17 PM

## 2018-01-12 LAB
ALBUMIN SERPL-MCNC: 3.4 G/DL (ref 3.4–4.8)
ALBUMIN/GLOB SERPL: 1 G/DL (ref 1.5–2.5)
ALP SERPL-CCNC: 66 U/L (ref 35–104)
ALT SERPL W P-5'-P-CCNC: 20 U/L (ref 10–36)
ANION GAP SERPL CALCULATED.3IONS-SCNC: 5.7 MMOL/L (ref 3.6–11.2)
AST SERPL-CCNC: 22 U/L (ref 10–30)
BACTERIA SPEC AEROBE CULT: NORMAL
BACTERIA SPEC AEROBE CULT: NORMAL
BASOPHILS # BLD AUTO: 0.03 10*3/MM3 (ref 0–0.3)
BASOPHILS NFR BLD AUTO: 0.4 % (ref 0–2)
BILIRUB SERPL-MCNC: 0.5 MG/DL (ref 0.2–1.8)
BUN BLD-MCNC: 7 MG/DL (ref 7–21)
BUN/CREAT SERPL: 14 (ref 7–25)
CALCIUM SPEC-SCNC: 9.1 MG/DL (ref 7.7–10)
CHLORIDE SERPL-SCNC: 109 MMOL/L (ref 99–112)
CO2 SERPL-SCNC: 23.3 MMOL/L (ref 24.3–31.9)
CREAT BLD-MCNC: 0.5 MG/DL (ref 0.43–1.29)
CRP SERPL-MCNC: 2.7 MG/DL (ref 0–0.99)
DEPRECATED RDW RBC AUTO: 46.1 FL (ref 37–54)
EOSINOPHIL # BLD AUTO: 0.32 10*3/MM3 (ref 0–0.7)
EOSINOPHIL NFR BLD AUTO: 4.6 % (ref 0–5)
ERYTHROCYTE [DISTWIDTH] IN BLOOD BY AUTOMATED COUNT: 14.2 % (ref 11.5–14.5)
GFR SERPL CREATININE-BSD FRML MDRD: 125 ML/MIN/1.73
GLOBULIN UR ELPH-MCNC: 3.5 GM/DL
GLUCOSE BLD-MCNC: 103 MG/DL (ref 70–110)
HCT VFR BLD AUTO: 43.1 % (ref 37–47)
HGB BLD-MCNC: 13.7 G/DL (ref 12–16)
IMM GRANULOCYTES # BLD: 0.02 10*3/MM3 (ref 0–0.03)
IMM GRANULOCYTES NFR BLD: 0.3 % (ref 0–0.5)
LYMPHOCYTES # BLD AUTO: 1.4 10*3/MM3 (ref 1–3)
LYMPHOCYTES NFR BLD AUTO: 20.2 % (ref 21–51)
MAGNESIUM SERPL-MCNC: 2.6 MG/DL (ref 1.7–2.6)
MCH RBC QN AUTO: 29.1 PG (ref 27–33)
MCHC RBC AUTO-ENTMCNC: 31.8 G/DL (ref 33–37)
MCV RBC AUTO: 91.5 FL (ref 80–94)
MONOCYTES # BLD AUTO: 0.6 10*3/MM3 (ref 0.1–0.9)
MONOCYTES NFR BLD AUTO: 8.7 % (ref 0–10)
NEUTROPHILS # BLD AUTO: 4.56 10*3/MM3 (ref 1.4–6.5)
NEUTROPHILS NFR BLD AUTO: 65.8 % (ref 30–70)
OSMOLALITY SERPL CALC.SUM OF ELEC: 273.9 MOSM/KG (ref 273–305)
PLATELET # BLD AUTO: 225 10*3/MM3 (ref 130–400)
PMV BLD AUTO: 12.7 FL (ref 6–10)
POTASSIUM BLD-SCNC: 4.3 MMOL/L (ref 3.5–5.3)
PROT SERPL-MCNC: 6.9 G/DL (ref 6–8)
RBC # BLD AUTO: 4.71 10*6/MM3 (ref 4.2–5.4)
SODIUM BLD-SCNC: 138 MMOL/L (ref 135–153)
WBC NRBC COR # BLD: 6.93 10*3/MM3 (ref 4.5–12.5)

## 2018-01-12 PROCEDURE — 80053 COMPREHEN METABOLIC PANEL: CPT | Performed by: INTERNAL MEDICINE

## 2018-01-12 PROCEDURE — 25010000002 PIPERACILLIN-TAZOBACTAM: Performed by: INTERNAL MEDICINE

## 2018-01-12 PROCEDURE — 94799 UNLISTED PULMONARY SVC/PX: CPT

## 2018-01-12 PROCEDURE — 99232 SBSQ HOSP IP/OBS MODERATE 35: CPT | Performed by: INTERNAL MEDICINE

## 2018-01-12 PROCEDURE — 83735 ASSAY OF MAGNESIUM: CPT | Performed by: INTERNAL MEDICINE

## 2018-01-12 PROCEDURE — 85025 COMPLETE CBC W/AUTO DIFF WBC: CPT | Performed by: INTERNAL MEDICINE

## 2018-01-12 PROCEDURE — 86140 C-REACTIVE PROTEIN: CPT | Performed by: INTERNAL MEDICINE

## 2018-01-12 RX ADMIN — POTASSIUM & SODIUM PHOSPHATES POWDER PACK 280-160-250 MG 1 PACKET: 280-160-250 PACK at 18:42

## 2018-01-12 RX ADMIN — POTASSIUM & SODIUM PHOSPHATES POWDER PACK 280-160-250 MG 1 PACKET: 280-160-250 PACK at 06:41

## 2018-01-12 RX ADMIN — LEVOTHYROXINE SODIUM 100 MCG: 100 TABLET ORAL at 06:38

## 2018-01-12 RX ADMIN — ASPIRIN 81 MG: 81 TABLET, CHEWABLE ORAL at 08:15

## 2018-01-12 RX ADMIN — IPRATROPIUM BROMIDE AND ALBUTEROL SULFATE 3 ML: .5; 3 SOLUTION RESPIRATORY (INHALATION) at 08:45

## 2018-01-12 RX ADMIN — PIPERACILLIN SODIUM,TAZOBACTAM SODIUM 3.38 G: 3; .375 INJECTION, POWDER, FOR SOLUTION INTRAVENOUS at 06:38

## 2018-01-12 RX ADMIN — POTASSIUM CHLORIDE 40 MEQ: 1.5 POWDER, FOR SOLUTION ORAL at 06:38

## 2018-01-12 RX ADMIN — PIPERACILLIN SODIUM,TAZOBACTAM SODIUM 3.38 G: 3; .375 INJECTION, POWDER, FOR SOLUTION INTRAVENOUS at 21:32

## 2018-01-12 RX ADMIN — Medication 1 CAPSULE: at 08:15

## 2018-01-12 RX ADMIN — PIPERACILLIN SODIUM,TAZOBACTAM SODIUM 3.38 G: 3; .375 INJECTION, POWDER, FOR SOLUTION INTRAVENOUS at 16:25

## 2018-01-12 NOTE — PROGRESS NOTES
Nutrition Services    Patient Name:  Baldo Eden  YOB: 1955  MRN: [id number]  Admit Date:  1/7/2018    Tf of Jevity 1.2 tomás at goal of 30 ml/hr to provide: 864 kcal (19 kcal/kg r/t risk for refeeding syndrome), 40 g pro (0.8 g/kg), 581 cc/d fluid.  IVF now dc'd.  Will ADD H2O flush of 25 ml/hr to provide: 600 cc/d.  With TF and flush, fluid provided at 1181 cc/d.  RD will follow.    Electronically signed by:  Tami Price RD  01/12/18 3:20 PM

## 2018-01-12 NOTE — PLAN OF CARE
Problem: Patient Care Overview (Adult)  Goal: Plan of Care Review   01/12/18 0519   Coping/Psychosocial Response Interventions   Plan Of Care Reviewed With patient   Patient Care Overview   Progress no change

## 2018-01-12 NOTE — PROGRESS NOTES
Antimicrobial Length of Therapy:    Day 5 Zosyn (ends after 1/15 doses)    Thank you.  Rand Benavidez, Pharm.D.  1/12/2018  9:43 AM

## 2018-01-12 NOTE — PROGRESS NOTES
I have seen the patient in conjunction with Katia RN and       History of presenting illness:  Patient cannot give a history due to her medical condition.   thinks for her condition she is doing much better.    Vital Signs  Temp:  [97.8 °F (36.6 °C)-99.3 °F (37.4 °C)] 98.6 °F (37 °C)  Heart Rate:  [57-79] 77  Resp:  [18-22] 18  BP: (108-122)/(54-75) 108/59  Body mass index is 14.1 kg/(m^2).      Intake/Output Summary (Last 24 hours) at 01/12/18 1608  Last data filed at 01/12/18 1517   Gross per 24 hour   Intake              507 ml   Output                0 ml   Net              507 ml     Intake & Output (last 3 days)       01/09 0701 - 01/10 0700 01/10 0701 - 01/11 0700 01/11 0701 - 01/12 0700 01/12 0701 - 01/13 0700    P.O. 0  0 0    I.V. (mL/kg) 160 (3.5)       Other 720  60     NG/GT   347     IV Piggyback 700 100 100     Total Intake(mL/kg) 1580 (34.8) 100 (2) 507 (11.1) 0 (0)    Urine (mL/kg/hr) 0 (0)       Stool 0 (0)       Total Output 0          Net +1580 +100 +507 0            Unmeasured Urine Occurrence 8 x 9 x 5 x 3 x    Unmeasured Stool Occurrence  1 x 0 x           Physical exam:  Physical Exam   Constitutional: Well-developed, Thin female, When her  talk to her time she does make a sound  Head: Normocephalic and atraumatic.  Mucous membranes more moist  Eyes:  Pupils are equal, round, no scleral icterus  Cardiovascular: Normal rate, regular rhythm and normal heart sounds.  No murmur rub or gallop  Pulmonary/Chest: Bilateral breath sounds clear today  Abdominal: Soft, flat, bowel sounds are active, nondistended nontender.  No peritoneal signs   Musculoskeletal: Cannot test strength, SCDs on  Neurological: No myoclonic movements noted  Skin: Skin is warm and dry.  the left hand streaking and vesicles resolving    Telemetry: Sinus , inverted T-wave, reviewed    Results Review:  Lab Results   Component Value Date    WBC 6.93 01/12/2018    HGB 13.7 01/12/2018    HCT 43.1 01/12/2018     MCV 91.5 01/12/2018     01/12/2018     Lab Results   Component Value Date    GLUCOSE 103 01/12/2018    BUN 7 01/12/2018    CREATININE 0.50 01/12/2018    EGFRIFNONA 125 01/12/2018    EGFRIFAFRI 121 10/07/2016    BCR 14.0 01/12/2018    CO2 23.3 (L) 01/12/2018    CALCIUM 9.1 01/12/2018    ALBUMIN 3.40 01/12/2018    LABIL2 1.0 (L) 01/12/2018    AST 22 01/12/2018    ALT 20 01/12/2018       Imaging Results (last 72 hours)     Procedure Component Value Units Date/Time    XR Chest 1 View [[id number]] Collected:  01/07/18 2355     Updated:  01/07/18 2357    Narrative:       EXAMINATION: XR CHEST 1 VW-      CLINICAL INDICATION:     congestion, soa     TECHNIQUE:  XR CHEST 1 VW-      COMPARISON: NONE      FINDINGS:   Patchy right infrahilar opacities.   Heart and mediastinal contours are unremarkable.   No pneumothorax.   No pleural effusion.   No acute osseous findings.            Impression:       Right infrahilar airspace disease.     This report was finalized on 1/7/2018 11:55 PM by Dr. Rubén Vickers MD.                 Assessment/Plan     Active Problems:  Severe sepsis with hypoxic respiratory failure superimposed on right pneumonia probable aspiration. CRP resolving, continue Zosyn.  Cultures remain negative,     Myoclonic movements, resolved    Hypothyroidism, dose adjusted here    Nutrition, tolerating tube feeds at goal rate.    Alzheimer's disease, advanced     Hand vesicles, continuing to heal    Hypernatremia, resolved    Abnormal EKG possible ischemia, on aspirin, no further intervention planned .  Does not appear to be in any discomfort     Long-term goals again discussed, support the patient through the weekend, speech therapy to reevaluate on Monday, if remains nothing by mouth decision will have to be made about a permanent feeding tube.  He has talked 1 daughter but is awaiting another daughter to come in.    DVT prophylaxis, SCDs are on      Wil Felix MD  01/12/18  4:08 PM

## 2018-01-12 NOTE — PROGRESS NOTES
"Palliative Care Daily Progress Note     S: Medical record reviewed, followed up with RN, SW, Floor RN regarding patient's condition. Events noted.  Patient lying in bed. She has no family present throughout our visit.  Staff reports that she is tolerating her tube feeds at this time.  Staff reports no significant changes in patient.        O:   Palliative Performance Scale Score: 20%   /59 (BP Location: Right arm, Patient Position: Lying)  Pulse 77  Temp 98.6 °F (37 °C) (Axillary)   Resp 18  Ht 180.3 cm (71\")  Wt 45.9 kg (101 lb 1.6 oz)  SpO2 95%  BMI 14.1 kg/m2    Intake/Output Summary (Last 24 hours) at 01/12/18 1402  Last data filed at 01/12/18 0638   Gross per 24 hour   Intake              507 ml   Output                0 ml   Net              507 ml       Physical exam:   Constitutional:  Well-developed, yet thin.  No respiratory distress.      HENT:  Head: Normocephalic and atraumatic.  Mouth:  Moist mucous membranes.    Eyes:  Conjunctivae and EOM are normal.  Pupils are equal, round, and reactive to light.  No scleral icterus.  Neck:  Neck supple.  No JVD present.  No tracheal deviation.  Cardiovascular:  Normal rate, regular rhythm and normal heart sounds with no murmur.  Pulmonary/Chest:  No respiratory distress, no wheezes, no crackles, with normal breath sounds and good air movement.  Abdominal:  Soft.  Bowel sounds are normal.  No distension and no tenderness.   Musculoskeletal:  Unable to examine due to patients LOC  Neurological:  She appears comfortable.   Skin:  Skin is warm and dry.    Peripheral vascular:  No edema and pulses on all 4 extremities.  Genitourinary:  --------------------        Meds: Reviewed     Labs:     Results from last 7 days  Lab Units 01/12/18  0435   WBC 10*3/mm3 6.93   HEMOGLOBIN g/dL 13.7   HEMATOCRIT % 43.1   PLATELETS 10*3/mm3 225       Results from last 7 days  Lab Units 01/12/18  0435   SODIUM mmol/L 138   POTASSIUM mmol/L 4.3   CHLORIDE mmol/L 109   CO2 " mmol/L 23.3*   BUN mg/dL 7   CREATININE mg/dL 0.50   GLUCOSE mg/dL 103   CALCIUM mg/dL 9.1       Results from last 7 days  Lab Units 01/12/18  0435   SODIUM mmol/L 138   POTASSIUM mmol/L 4.3   CHLORIDE mmol/L 109   CO2 mmol/L 23.3*   BUN mg/dL 7   CREATININE mg/dL 0.50   CALCIUM mg/dL 9.1   BILIRUBIN mg/dL 0.5   ALK PHOS U/L 66   ALT (SGPT) U/L 20   AST (SGOT) U/L 22   GLUCOSE mg/dL 103     Imaging Results (last 72 hours)     Procedure Component Value Units Date/Time    XR Chest AP [[id number]] Collected:  01/11/18 0536     Updated:  01/11/18 0539    Narrative:       EXAMINATION: XR CHEST AP-      CLINICAL INDICATION:     Follow up Pneumonia; J69.0-Pneumonitis due to  inhalation of food and vomit; A41.9-Sepsis, unspecified organism     TECHNIQUE:  XR CHEST AP-      COMPARISON: 01/09/2018      FINDINGS:   Bilateral interstitial opacities stable with more focal opacities right  lung base now noted. NG tube extends into stomach. No effusion or  pneumothorax.       Impression:       Developing right basilar airspace disease. Stable  interstitial thickening. NG tube with tip in stomach.     This report was finalized on 1/11/2018 5:37 AM by Dr. Rubén Vickers MD.               Diagnostics: Reviewed    Assessment/Plan:  Patient is a 62 year old female with aspiration pneumonia. Patient is tolerating tube feeds at this time.      Continue POC    Have further discussion with patients  about GOC.      Educate on GOC and determine what he has decided about PEG tube placement if she is unable to pass swallow tests.    Active Problems:    Aspiration pneumonia of right lower lobe      We will continue to follow along. Please do not hesitate to contact us regarding further sx mgmt or GOC needs, including after hours or on weekends via our on call provider at [phone].        Lisa Douglas, APRN    1/12/2018

## 2018-01-12 NOTE — PROGRESS NOTES
Discharge Planning Assessment  WILL Alfaro     Patient Name: Baldo Eden  MRN: [id number]  Today's Date: 1/12/2018    Admit Date: 1/7/2018       Discharge Plan       01/12/18 1426    Case Management/Social Work Plan    Plan Pt was admitted from [facility] and had a 14 day skilled bed hold upon admission. SS to follow.         Discharge Placement     Facility/Agency Request Status Selected? Address Phone Number Fax Number    [facility] Pending - No Request Sent     [address] [place] [address] [phone]        Expected Discharge Date and Time     Expected Discharge Date Expected Discharge Time    Omar 15, 2018           Lu Mayo

## 2018-01-13 LAB
ALBUMIN SERPL-MCNC: 3.7 G/DL (ref 3.4–4.8)
ALBUMIN/GLOB SERPL: 1.1 G/DL (ref 1.5–2.5)
ALP SERPL-CCNC: 66 U/L (ref 35–104)
ALT SERPL W P-5'-P-CCNC: 21 U/L (ref 10–36)
ANION GAP SERPL CALCULATED.3IONS-SCNC: 4.3 MMOL/L (ref 3.6–11.2)
AST SERPL-CCNC: 19 U/L (ref 10–30)
BASOPHILS # BLD AUTO: 0.06 10*3/MM3 (ref 0–0.3)
BASOPHILS NFR BLD AUTO: 0.9 % (ref 0–2)
BILIRUB SERPL-MCNC: 0.5 MG/DL (ref 0.2–1.8)
BUN BLD-MCNC: 9 MG/DL (ref 7–21)
BUN/CREAT SERPL: 16.7 (ref 7–25)
CALCIUM SPEC-SCNC: 8.9 MG/DL (ref 7.7–10)
CHLORIDE SERPL-SCNC: 109 MMOL/L (ref 99–112)
CO2 SERPL-SCNC: 25.7 MMOL/L (ref 24.3–31.9)
CREAT BLD-MCNC: 0.54 MG/DL (ref 0.43–1.29)
DEPRECATED RDW RBC AUTO: 46.1 FL (ref 37–54)
EOSINOPHIL # BLD AUTO: 0.32 10*3/MM3 (ref 0–0.7)
EOSINOPHIL NFR BLD AUTO: 4.8 % (ref 0–5)
ERYTHROCYTE [DISTWIDTH] IN BLOOD BY AUTOMATED COUNT: 14.3 % (ref 11.5–14.5)
GFR SERPL CREATININE-BSD FRML MDRD: 114 ML/MIN/1.73
GLOBULIN UR ELPH-MCNC: 3.3 GM/DL
GLUCOSE BLD-MCNC: 109 MG/DL (ref 70–110)
HCT VFR BLD AUTO: 40.4 % (ref 37–47)
HGB BLD-MCNC: 12.8 G/DL (ref 12–16)
IMM GRANULOCYTES # BLD: 0.02 10*3/MM3 (ref 0–0.03)
IMM GRANULOCYTES NFR BLD: 0.3 % (ref 0–0.5)
LYMPHOCYTES # BLD AUTO: 1.77 10*3/MM3 (ref 1–3)
LYMPHOCYTES NFR BLD AUTO: 26.7 % (ref 21–51)
MAGNESIUM SERPL-MCNC: 2.6 MG/DL (ref 1.7–2.6)
MCH RBC QN AUTO: 29.4 PG (ref 27–33)
MCHC RBC AUTO-ENTMCNC: 31.7 G/DL (ref 33–37)
MCV RBC AUTO: 92.9 FL (ref 80–94)
MONOCYTES # BLD AUTO: 0.7 10*3/MM3 (ref 0.1–0.9)
MONOCYTES NFR BLD AUTO: 10.6 % (ref 0–10)
NEUTROPHILS # BLD AUTO: 3.75 10*3/MM3 (ref 1.4–6.5)
NEUTROPHILS NFR BLD AUTO: 56.7 % (ref 30–70)
OSMOLALITY SERPL CALC.SUM OF ELEC: 276.8 MOSM/KG (ref 273–305)
PHOSPHATE SERPL-MCNC: 3.8 MG/DL (ref 2.7–4.5)
PLATELET # BLD AUTO: 257 10*3/MM3 (ref 130–400)
PMV BLD AUTO: 12.2 FL (ref 6–10)
POTASSIUM BLD-SCNC: 4 MMOL/L (ref 3.5–5.3)
PROT SERPL-MCNC: 7 G/DL (ref 6–8)
RBC # BLD AUTO: 4.35 10*6/MM3 (ref 4.2–5.4)
SODIUM BLD-SCNC: 139 MMOL/L (ref 135–153)
WBC NRBC COR # BLD: 6.62 10*3/MM3 (ref 4.5–12.5)

## 2018-01-13 PROCEDURE — 84100 ASSAY OF PHOSPHORUS: CPT | Performed by: INTERNAL MEDICINE

## 2018-01-13 PROCEDURE — 85025 COMPLETE CBC W/AUTO DIFF WBC: CPT | Performed by: INTERNAL MEDICINE

## 2018-01-13 PROCEDURE — 80053 COMPREHEN METABOLIC PANEL: CPT | Performed by: INTERNAL MEDICINE

## 2018-01-13 PROCEDURE — 99232 SBSQ HOSP IP/OBS MODERATE 35: CPT | Performed by: INTERNAL MEDICINE

## 2018-01-13 PROCEDURE — 94799 UNLISTED PULMONARY SVC/PX: CPT

## 2018-01-13 PROCEDURE — 25010000002 PIPERACILLIN-TAZOBACTAM: Performed by: INTERNAL MEDICINE

## 2018-01-13 PROCEDURE — 83735 ASSAY OF MAGNESIUM: CPT | Performed by: INTERNAL MEDICINE

## 2018-01-13 RX ORDER — BACITRACIN ZINC 500 [USP'U]/G
OINTMENT TOPICAL EVERY 12 HOURS SCHEDULED
Status: DISCONTINUED | OUTPATIENT
Start: 2018-01-13 | End: 2018-01-17 | Stop reason: HOSPADM

## 2018-01-13 RX ADMIN — PIPERACILLIN SODIUM,TAZOBACTAM SODIUM 3.38 G: 3; .375 INJECTION, POWDER, FOR SOLUTION INTRAVENOUS at 04:56

## 2018-01-13 RX ADMIN — BACITRACIN ZINC: 500 OINTMENT TOPICAL at 14:35

## 2018-01-13 RX ADMIN — BACITRACIN ZINC: 500 OINTMENT TOPICAL at 21:15

## 2018-01-13 RX ADMIN — PIPERACILLIN SODIUM,TAZOBACTAM SODIUM 3.38 G: 3; .375 INJECTION, POWDER, FOR SOLUTION INTRAVENOUS at 21:15

## 2018-01-13 RX ADMIN — LEVOTHYROXINE SODIUM 100 MCG: 100 TABLET ORAL at 04:55

## 2018-01-13 RX ADMIN — IPRATROPIUM BROMIDE AND ALBUTEROL SULFATE 3 ML: .5; 3 SOLUTION RESPIRATORY (INHALATION) at 07:12

## 2018-01-13 RX ADMIN — Medication 1 CAPSULE: at 08:47

## 2018-01-13 RX ADMIN — ASPIRIN 81 MG: 81 TABLET, CHEWABLE ORAL at 08:47

## 2018-01-13 RX ADMIN — PIPERACILLIN SODIUM,TAZOBACTAM SODIUM 3.38 G: 3; .375 INJECTION, POWDER, FOR SOLUTION INTRAVENOUS at 14:35

## 2018-01-13 RX ADMIN — POTASSIUM & SODIUM PHOSPHATES POWDER PACK 280-160-250 MG 1 PACKET: 280-160-250 PACK at 17:13

## 2018-01-13 RX ADMIN — POTASSIUM & SODIUM PHOSPHATES POWDER PACK 280-160-250 MG 1 PACKET: 280-160-250 PACK at 08:47

## 2018-01-13 RX ADMIN — IPRATROPIUM BROMIDE AND ALBUTEROL SULFATE 3 ML: .5; 3 SOLUTION RESPIRATORY (INHALATION) at 18:45

## 2018-01-13 NOTE — NURSING NOTE
Photos taken of purple fluid filled/open blisters to patients left hand, fingers, & palm. Photos also taken of yellow open area/pressure ulcer to left lateral foot. Bactroban applied with gauze to left hand/finger/palm per wound care nurse order. Honey & mepilex applied to Left lateral foot & coccyx per order. Mepilexes applied to spine, lumbar region of back, bilateral hips, & right ankle for protection. Blue boots applied to both feet per wound care nurse recommendation.

## 2018-01-13 NOTE — PLAN OF CARE
Problem: Patient Care Overview (Adult)  Goal: Plan of Care Review  Outcome: Ongoing (interventions implemented as appropriate)   01/13/18 0427 01/13/18 0900   Coping/Psychosocial Response Interventions   Plan Of Care Reviewed With --  patient   Patient Care Overview   Progress progress toward functional goals as expected --      Goal: Adult Individualization and Mutuality  Outcome: Ongoing (interventions implemented as appropriate)    Goal: Discharge Needs Assessment  Outcome: Ongoing (interventions implemented as appropriate)      Problem: Skin Integrity Impairment, Risk/Actual (Adult)  Goal: Identify Related Risk Factors and Signs and Symptoms  Outcome: Ongoing (interventions implemented as appropriate)    Goal: Skin Integrity/Wound Healing  Outcome: Ongoing (interventions implemented as appropriate)    Goal: Identify Related Risk Factors and Signs and Symptoms  Outcome: Ongoing (interventions implemented as appropriate)    Goal: Skin Integrity/Wound Healing  Outcome: Ongoing (interventions implemented as appropriate)      Problem: Sepsis (Adult)  Goal: Signs and Symptoms of Listed Potential Problems Will be Absent or Manageable (Sepsis)  Outcome: Ongoing (interventions implemented as appropriate)      Problem: Fall Risk (Adult)  Goal: Identify Related Risk Factors and Signs and Symptoms  Outcome: Ongoing (interventions implemented as appropriate)    Goal: Absence of Falls  Outcome: Ongoing (interventions implemented as appropriate)      Problem: Dysphagia (Adult)  Goal: Identify Related Risk Factors and Signs and Symptoms  Outcome: Ongoing (interventions implemented as appropriate)    Goal: Functional/Safe Swallow  Outcome: Ongoing (interventions implemented as appropriate)    Goal: Compensatory Techniques to Improve Safety/Function with Swallowing  Outcome: Ongoing (interventions implemented as appropriate)      Problem: Anxiety (Adult)  Goal: Identify Related Risk Factors and Signs and Symptoms  Outcome:  Ongoing (interventions implemented as appropriate)    Goal: Reduction/Resolution  Outcome: Ongoing (interventions implemented as appropriate)      Problem: Infection, Risk/Actual (Adult)  Goal: Identify Related Risk Factors and Signs and Symptoms  Outcome: Ongoing (interventions implemented as appropriate)    Goal: Infection Prevention/Resolution  Outcome: Ongoing (interventions implemented as appropriate)      Problem: Pressure Ulcer (Adult)  Goal: Signs and Symptoms of Listed Potential Problems Will be Absent or Manageable (Pressure Ulcer)  Outcome: Ongoing (interventions implemented as appropriate)      Problem: Mobility, Physical Impaired (Adult)  Goal: Identify Related Risk Factors and Signs and Symptoms  Outcome: Ongoing (interventions implemented as appropriate)    Goal: Enhanced Mobility Skills  Outcome: Ongoing (interventions implemented as appropriate)    Goal: Enhanced Functionality Ability  Outcome: Ongoing (interventions implemented as appropriate)      Problem: Respiratory Insufficiency (Adult)  Goal: Identify Related Risk Factors and Signs and Symptoms  Outcome: Ongoing (interventions implemented as appropriate)    Goal: Acid/Base Balance  Outcome: Ongoing (interventions implemented as appropriate)    Goal: Effective Ventilation  Outcome: Ongoing (interventions implemented as appropriate)

## 2018-01-13 NOTE — PLAN OF CARE
Problem: Patient Care Overview (Adult)  Goal: Plan of Care Review  Outcome: Ongoing (interventions implemented as appropriate)   01/13/18 0427   Coping/Psychosocial Response Interventions   Plan Of Care Reviewed With patient   Patient Care Overview   Progress progress toward functional goals as expected     Goal: Adult Individualization and Mutuality  Outcome: Ongoing (interventions implemented as appropriate)      Problem: Skin Integrity Impairment, Risk/Actual (Adult)  Goal: Identify Related Risk Factors and Signs and Symptoms  Outcome: Ongoing (interventions implemented as appropriate)   01/10/18 1813   Skin Integrity Impairment, Risk/Actual   Skin Integrity Impairment, Risk/Actual: Related Risk Factors cognitive impairment;fluid/nutrition status;immobility   Signs and Symptoms (Skin Integrity Impairment) bulla/blister/vesicle     Goal: Skin Integrity/Wound Healing  Outcome: Ongoing (interventions implemented as appropriate)   01/13/18 0427   Skin Integrity Impairment, Risk/Actual (Adult)   Skin Integrity/Wound Healing making progress toward outcome     Goal: Identify Related Risk Factors and Signs and Symptoms  Outcome: Ongoing (interventions implemented as appropriate)   01/10/18 1813   Skin Integrity Impairment, Risk/Actual   Skin Integrity Impairment, Risk/Actual: Related Risk Factors cognitive impairment;fluid/nutrition status;immobility   Signs and Symptoms (Skin Integrity Impairment) bulla/blister/vesicle     Goal: Skin Integrity/Wound Healing  Outcome: Ongoing (interventions implemented as appropriate)   01/13/18 0427   Skin Integrity Impairment, Risk/Actual (Adult)   Skin Integrity/Wound Healing making progress toward outcome       Problem: Sepsis (Adult)  Goal: Signs and Symptoms of Listed Potential Problems Will be Absent or Manageable (Sepsis)  Outcome: Ongoing (interventions implemented as appropriate)   01/13/18 0427   Sepsis   Problems Assessed (Sepsis) all   Problems Present (Sepsis) malnutrition  (undernutrition);progression of infection       Problem: Fall Risk (Adult)  Goal: Identify Related Risk Factors and Signs and Symptoms  Outcome: Ongoing (interventions implemented as appropriate)   01/10/18 1813 01/12/18 0519   Fall Risk   Fall Risk: Related Risk Factors gait/mobility problems;confusion/agitation;neuro disease/injury --    Fall Risk: Signs and Symptoms --  presence of risk factors     Goal: Absence of Falls  Outcome: Ongoing (interventions implemented as appropriate)   01/13/18 0427   Fall Risk (Adult)   Absence of Falls making progress toward outcome       Problem: Dysphagia (Adult)  Goal: Identify Related Risk Factors and Signs and Symptoms  Outcome: Ongoing (interventions implemented as appropriate)   01/08/18 0240 01/13/18 0427   Dysphagia   Dysphagia: Related Risk Factors functional decline;mental status altered --    General Observations Signs and Symptoms (Dysphagia) --  recurring pneumonia   Oral Phase Dysphagia Signs and Symptoms (Dysphagia) --  facial droop;poor tongue control   Pharyngeal Phase Dysphagia Signs and Symptoms (Dysphagia) --  aspiration clinically evident   Upper Esophageal Phase Dysphagia Signs and Symptoms (Dysphagia) --  difficulty with solid foods     Goal: Functional/Safe Swallow  Outcome: Ongoing (interventions implemented as appropriate)   01/13/18 0427   Dysphagia (Adult)   Functional/Safe Swallow making progress toward outcome     Goal: Compensatory Techniques to Improve Safety/Function with Swallowing  Outcome: Ongoing (interventions implemented as appropriate)   01/13/18 0427   Dysphagia (Adult)   Compensatory Techniques to Improve Safety/Function with Swallowing making progress toward outcome       Problem: Anxiety (Adult)  Goal: Identify Related Risk Factors and Signs and Symptoms  Outcome: Ongoing (interventions implemented as appropriate)   01/08/18 0240   Anxiety   Related Risk Factors (Anxiety) cognitive status;knowledge deficit;environment change   Signs and  Symptoms (Anxiety) body tremors/twitching/restless legs     Goal: Reduction/Resolution  Outcome: Ongoing (interventions implemented as appropriate)   01/13/18 0427   Anxiety (Adult)   Reduction/Resolution making progress toward outcome       Problem: Infection, Risk/Actual (Adult)  Goal: Identify Related Risk Factors and Signs and Symptoms  Outcome: Ongoing (interventions implemented as appropriate)   01/08/18 0240   Infection, Risk/Actual   Infection, Risk/Actual: Related Risk Factors age extremes;chronic illness/condition;exposure to microbes;malnutrition;skin integrity impairment   Signs and Symptoms (Infection, Risk/Actual) malaise;weakness;respiratory rate increase     Goal: Infection Prevention/Resolution  Outcome: Ongoing (interventions implemented as appropriate)   01/13/18 0427   Infection, Risk/Actual (Adult)   Infection Prevention/Resolution making progress toward outcome       Problem: Pressure Ulcer (Adult)  Goal: Signs and Symptoms of Listed Potential Problems Will be Absent or Manageable (Pressure Ulcer)  Outcome: Ongoing (interventions implemented as appropriate)   01/13/18 0427   Pressure Ulcer   Problems Assessed (Pressure Ulcer) all   Problems Present (Pressure Ulcer) wound progression/extension       Problem: Mobility, Physical Impaired (Adult)  Goal: Identify Related Risk Factors and Signs and Symptoms  Outcome: Ongoing (interventions implemented as appropriate)   01/09/18 0046 01/10/18 0107   Mobility, Physical Impaired   Physical Mobility, Impaired: Related Risk Factors --  activity intolerance;disease process;malnutrition;respiratory insufficiency   Signs and Symptoms (Physical Mobility Impaired) limited ability to perform gross/fine motor skills;unsafe transfers/ambulation --      Goal: Enhanced Mobility Skills  Outcome: Ongoing (interventions implemented as appropriate)   01/13/18 0427   Mobility, Physical Impaired (Adult)   Enhanced Mobility Skills making progress toward outcome     Goal:  Enhanced Functionality Ability  Outcome: Ongoing (interventions implemented as appropriate)   01/13/18 0427   Mobility, Physical Impaired (Adult)   Enhanced Functionality Ability making progress toward outcome       Problem: Respiratory Insufficiency (Adult)  Goal: Identify Related Risk Factors and Signs and Symptoms  Outcome: Ongoing (interventions implemented as appropriate)   01/09/18 0046 01/10/18 0107   Respiratory Insufficiency   Related Risk Factors (Respiratory Insufficiency) --  activity intolerance;bedrest   Signs and Symptoms (Respiratory Insufficiency) abnormal breath sounds;cough (productive/ineffective);decreased oxygen saturation;level of consciousness change;respiratory rate alterations;sleep apnea/periodic apnea;sleeplessness/fatigue;use of accessory muscles --      Goal: Acid/Base Balance  Outcome: Ongoing (interventions implemented as appropriate)   01/13/18 0427   Respiratory Insufficiency (Adult)   Acid/Base Balance making progress toward outcome     Goal: Effective Ventilation  Outcome: Ongoing (interventions implemented as appropriate)   01/13/18 0427   Respiratory Insufficiency (Adult)   Effective Ventilation making progress toward outcome

## 2018-01-13 NOTE — PLAN OF CARE
Problem: Pressure Ulcer Risk (Mike Scale) (Adult,Obstetrics,Pediatric)  Goal: Identify Related Risk Factors and Signs and Symptoms  Outcome: Ongoing (interventions implemented as appropriate)   01/13/18 1059   Pressure Ulcer Risk (Mike Scale)   Related Risk Factors (Pressure Ulcer Risk (Mike Scale)) cognitive impairment;mental impairment;mobility impaired;nutritional deficiencies     Goal: Skin Integrity  Outcome: Ongoing (interventions implemented as appropriate)

## 2018-01-13 NOTE — PROGRESS NOTES
I have seen the patient in conjunction with [facility]      History of presenting illness:  Patient cannot give a history due to her medical condition.     Vital Signs  Temp:  [98 °F (36.7 °C)-98.8 °F (37.1 °C)] 98.6 °F (37 °C)  Heart Rate:  [] 105  Resp:  [18] 18  BP: (102-147)/(54-76) 104/54  Body mass index is 14.1 kg/(m^2).      Intake/Output Summary (Last 24 hours) at 01/13/18 1535  Last data filed at 01/13/18 1435   Gross per 24 hour   Intake             2356 ml   Output                0 ml   Net             2356 ml     Intake & Output (last 3 days)       01/10 0701 - 01/11 0700 01/11 0701 - 01/12 0700 01/12 0701 - 01/13 0700 01/13 0701 - 01/14 0700    P.O.  0 0 0    I.V. (mL/kg)        Other  60 548 25    NG/GT  347 1583     IV Piggyback 100 100 100 100    Total Intake(mL/kg) 100 (2) 507 (11.1) 2231 (48.7) 125 (2.7)    Urine (mL/kg/hr)        Stool        Total Output            Net +100 +507 +2231 +125            Unmeasured Urine Occurrence 9 x 5 x 6 x     Unmeasured Stool Occurrence 1 x 0 x 0 x           Physical exam:  Physical Exam   Constitutional: Well-developed, Thin female I did note a purposeful movement she reached her right arm up and scratched the left side of her neck.  Head: Normocephalic and atraumatic.  Mucous membranes more moist  Eyes:  Pupils are equal, round, no scleral icterus  Cardiovascular: Normal rate, regular rhythm and normal heart sounds.  No murmur rub or gallop  Pulmonary/Chest: Bilateral breath sounds clear no rhonchi rales or wheezing her today  Abdominal: Soft, flat, bowel sounds are active, nondistended nontender.  No peritoneal signs   Musculoskeletal: Cannot test strength, SCDs on  Neurological: No significant change some purposeful movement as above  Skin: Skin is warm and dry.  Left hand is wrapped, pictures reviewed, these appear to be about the same.    Telemetry: Sinus and sinus bradycardia, inverted T-wave    Results Review:  Lab Results   Component Value Date     WBC 6.62 01/13/2018    HGB 12.8 01/13/2018    HCT 40.4 01/13/2018    MCV 92.9 01/13/2018     01/13/2018     Lab Results   Component Value Date    GLUCOSE 109 01/13/2018    BUN 9 01/13/2018    CREATININE 0.54 01/13/2018    EGFRIFNONA 114 01/13/2018    EGFRIFAFRI 121 10/07/2016    BCR 16.7 01/13/2018    CO2 25.7 01/13/2018    CALCIUM 8.9 01/13/2018    ALBUMIN 3.70 01/13/2018    LABIL2 1.1 (L) 01/13/2018    AST 19 01/13/2018    ALT 21 01/13/2018       Imaging Results (last 72 hours)     Procedure Component Value Units Date/Time    XR Chest 1 View [[id number]] Collected:  01/07/18 2355     Updated:  01/07/18 2357    Narrative:       EXAMINATION: XR CHEST 1 VW-      CLINICAL INDICATION:     congestion, soa     TECHNIQUE:  XR CHEST 1 VW-      COMPARISON: NONE      FINDINGS:   Patchy right infrahilar opacities.   Heart and mediastinal contours are unremarkable.   No pneumothorax.   No pleural effusion.   No acute osseous findings.            Impression:       Right infrahilar airspace disease.     This report was finalized on 1/7/2018 11:55 PM by Dr. Rubén Vickers MD.                 Assessment/Plan     Active Problems:  Severe sepsis with hypoxic respiratory failure superimposed on right pneumonia probable aspiration. On Zosyn, recheck CRP in the a.m., cultures negative, recheck chest x-ray in the a.m.    Myoclonic movements, resolved    Hypothyroidism, slightly over replaced and dose adjusted here    Nutrition, tolerating tube feeds at goal rate.    Alzheimer's disease, advanced     Hand vesicles, continuing to hea, wound care has ordered Bactroban     Hypernatremia, resolved    Bradycardia, blood pressure is adequate, follow she is not on any beta blocker    Abnormal EKG possible ischemia, on aspirin, no further intervention planned .  Does not appear to be in any discomfort     Insignificant urine colony count of Klebsiella, no further intervention warranted    DVT prophylaxis, SCDs are on      Wil F. Heallie,  MD  01/13/18  3:35 PM

## 2018-01-13 NOTE — NURSING NOTE
"Patient has 9 separate wounds to left hand including open, moist wounds; blood filled blisters; ruptured blister; and yellow slough covered wounds, to medial fingers.  Patient also has 4 deep purple discolorations to palm of hand.  Spoke to Elena Butler about wounds as she saw patient on admission (see note).  She does not feel these wounds are pressure related.  Treatment ordered.  Attempted to obtain \"carrot\" for postioning use in hand but was unable to obtain one from OT.      Patient also noted to have a moist, yellow slough covered wound to left lateral foot that is documented as scab on admission.  Treatment ordered.  "

## 2018-01-14 ENCOUNTER — APPOINTMENT (OUTPATIENT)
Dept: GENERAL RADIOLOGY | Facility: HOSPITAL | Age: 63
End: 2018-01-14

## 2018-01-14 LAB
ANION GAP SERPL CALCULATED.3IONS-SCNC: 7.3 MMOL/L (ref 3.6–11.2)
BASOPHILS # BLD AUTO: 0.07 10*3/MM3 (ref 0–0.3)
BASOPHILS NFR BLD AUTO: 1.1 % (ref 0–2)
BUN BLD-MCNC: 11 MG/DL (ref 7–21)
BUN/CREAT SERPL: 15.7 (ref 7–25)
CALCIUM SPEC-SCNC: 8.9 MG/DL (ref 7.7–10)
CHLORIDE SERPL-SCNC: 105 MMOL/L (ref 99–112)
CO2 SERPL-SCNC: 27.7 MMOL/L (ref 24.3–31.9)
CREAT BLD-MCNC: 0.7 MG/DL (ref 0.43–1.29)
DEPRECATED RDW RBC AUTO: 46.2 FL (ref 37–54)
EOSINOPHIL # BLD AUTO: 0.33 10*3/MM3 (ref 0–0.7)
EOSINOPHIL NFR BLD AUTO: 5.1 % (ref 0–5)
ERYTHROCYTE [DISTWIDTH] IN BLOOD BY AUTOMATED COUNT: 14.1 % (ref 11.5–14.5)
GFR SERPL CREATININE-BSD FRML MDRD: 85 ML/MIN/1.73
GLUCOSE BLD-MCNC: 107 MG/DL (ref 70–110)
HCT VFR BLD AUTO: 41.9 % (ref 37–47)
HGB BLD-MCNC: 13.2 G/DL (ref 12–16)
IMM GRANULOCYTES # BLD: 0.02 10*3/MM3 (ref 0–0.03)
IMM GRANULOCYTES NFR BLD: 0.3 % (ref 0–0.5)
LYMPHOCYTES # BLD AUTO: 1.6 10*3/MM3 (ref 1–3)
LYMPHOCYTES NFR BLD AUTO: 24.6 % (ref 21–51)
MAGNESIUM SERPL-MCNC: 2.6 MG/DL (ref 1.7–2.6)
MCH RBC QN AUTO: 29.1 PG (ref 27–33)
MCHC RBC AUTO-ENTMCNC: 31.5 G/DL (ref 33–37)
MCV RBC AUTO: 92.5 FL (ref 80–94)
MONOCYTES # BLD AUTO: 0.77 10*3/MM3 (ref 0.1–0.9)
MONOCYTES NFR BLD AUTO: 11.8 % (ref 0–10)
NEUTROPHILS # BLD AUTO: 3.72 10*3/MM3 (ref 1.4–6.5)
NEUTROPHILS NFR BLD AUTO: 57.1 % (ref 30–70)
OSMOLALITY SERPL CALC.SUM OF ELEC: 279.3 MOSM/KG (ref 273–305)
PLATELET # BLD AUTO: 274 10*3/MM3 (ref 130–400)
PMV BLD AUTO: 12.2 FL (ref 6–10)
POTASSIUM BLD-SCNC: 4.9 MMOL/L (ref 3.5–5.3)
RBC # BLD AUTO: 4.53 10*6/MM3 (ref 4.2–5.4)
SODIUM BLD-SCNC: 140 MMOL/L (ref 135–153)
T4 FREE SERPL-MCNC: 1.08 NG/DL (ref 0.89–1.76)
TSH SERPL DL<=0.05 MIU/L-ACNC: 5 MIU/ML (ref 0.55–4.78)
WBC NRBC COR # BLD: 6.51 10*3/MM3 (ref 4.5–12.5)

## 2018-01-14 PROCEDURE — 94799 UNLISTED PULMONARY SVC/PX: CPT

## 2018-01-14 PROCEDURE — 25010000002 PIPERACILLIN-TAZOBACTAM: Performed by: INTERNAL MEDICINE

## 2018-01-14 PROCEDURE — 71045 X-RAY EXAM CHEST 1 VIEW: CPT

## 2018-01-14 PROCEDURE — 99232 SBSQ HOSP IP/OBS MODERATE 35: CPT | Performed by: INTERNAL MEDICINE

## 2018-01-14 PROCEDURE — 85025 COMPLETE CBC W/AUTO DIFF WBC: CPT | Performed by: INTERNAL MEDICINE

## 2018-01-14 PROCEDURE — 84443 ASSAY THYROID STIM HORMONE: CPT | Performed by: INTERNAL MEDICINE

## 2018-01-14 PROCEDURE — 83735 ASSAY OF MAGNESIUM: CPT | Performed by: INTERNAL MEDICINE

## 2018-01-14 PROCEDURE — 80048 BASIC METABOLIC PNL TOTAL CA: CPT | Performed by: INTERNAL MEDICINE

## 2018-01-14 PROCEDURE — 84439 ASSAY OF FREE THYROXINE: CPT | Performed by: INTERNAL MEDICINE

## 2018-01-14 PROCEDURE — 71045 X-RAY EXAM CHEST 1 VIEW: CPT | Performed by: RADIOLOGY

## 2018-01-14 RX ORDER — AMOXICILLIN AND CLAVULANATE POTASSIUM 500; 125 MG/1; MG/1
1 TABLET, FILM COATED ORAL EVERY 12 HOURS SCHEDULED
Status: DISCONTINUED | OUTPATIENT
Start: 2018-01-14 | End: 2018-01-17 | Stop reason: HOSPADM

## 2018-01-14 RX ADMIN — PIPERACILLIN SODIUM,TAZOBACTAM SODIUM 3.38 G: 3; .375 INJECTION, POWDER, FOR SOLUTION INTRAVENOUS at 05:38

## 2018-01-14 RX ADMIN — ASPIRIN 81 MG: 81 TABLET, CHEWABLE ORAL at 09:00

## 2018-01-14 RX ADMIN — POTASSIUM & SODIUM PHOSPHATES POWDER PACK 280-160-250 MG 1 PACKET: 280-160-250 PACK at 09:00

## 2018-01-14 RX ADMIN — LEVOTHYROXINE SODIUM 100 MCG: 100 TABLET ORAL at 05:38

## 2018-01-14 RX ADMIN — IPRATROPIUM BROMIDE AND ALBUTEROL SULFATE 3 ML: .5; 3 SOLUTION RESPIRATORY (INHALATION) at 20:13

## 2018-01-14 RX ADMIN — BACITRACIN ZINC: 500 OINTMENT TOPICAL at 09:01

## 2018-01-14 RX ADMIN — POTASSIUM & SODIUM PHOSPHATES POWDER PACK 280-160-250 MG 1 PACKET: 280-160-250 PACK at 17:42

## 2018-01-14 RX ADMIN — Medication 1 CAPSULE: at 09:00

## 2018-01-14 RX ADMIN — AMOXICILLIN AND CLAVULANATE POTASSIUM 500 MG: 500; 125 TABLET, FILM COATED ORAL at 20:14

## 2018-01-14 RX ADMIN — BACITRACIN ZINC: 500 OINTMENT TOPICAL at 22:00

## 2018-01-14 NOTE — PLAN OF CARE
Problem: Patient Care Overview (Adult)  Goal: Plan of Care Review  Outcome: Ongoing (interventions implemented as appropriate)   01/14/18 0119 01/14/18 0900   Coping/Psychosocial Response Interventions   Plan Of Care Reviewed With --  patient   Patient Care Overview   Progress progress toward functional goals as expected --      Goal: Adult Individualization and Mutuality  Outcome: Ongoing (interventions implemented as appropriate)    Goal: Discharge Needs Assessment  Outcome: Ongoing (interventions implemented as appropriate)      Problem: Skin Integrity Impairment, Risk/Actual (Adult)  Goal: Identify Related Risk Factors and Signs and Symptoms  Outcome: Ongoing (interventions implemented as appropriate)    Goal: Skin Integrity/Wound Healing  Outcome: Ongoing (interventions implemented as appropriate)    Goal: Identify Related Risk Factors and Signs and Symptoms  Outcome: Ongoing (interventions implemented as appropriate)    Goal: Skin Integrity/Wound Healing  Outcome: Ongoing (interventions implemented as appropriate)      Problem: Sepsis (Adult)  Goal: Signs and Symptoms of Listed Potential Problems Will be Absent or Manageable (Sepsis)  Outcome: Ongoing (interventions implemented as appropriate)      Problem: Fall Risk (Adult)  Goal: Identify Related Risk Factors and Signs and Symptoms  Outcome: Ongoing (interventions implemented as appropriate)    Goal: Absence of Falls  Outcome: Ongoing (interventions implemented as appropriate)      Problem: Dysphagia (Adult)  Goal: Identify Related Risk Factors and Signs and Symptoms  Outcome: Ongoing (interventions implemented as appropriate)    Goal: Functional/Safe Swallow  Outcome: Ongoing (interventions implemented as appropriate)    Goal: Compensatory Techniques to Improve Safety/Function with Swallowing  Outcome: Ongoing (interventions implemented as appropriate)      Problem: Anxiety (Adult)  Goal: Identify Related Risk Factors and Signs and Symptoms  Outcome:  Ongoing (interventions implemented as appropriate)    Goal: Reduction/Resolution  Outcome: Ongoing (interventions implemented as appropriate)      Problem: Infection, Risk/Actual (Adult)  Goal: Identify Related Risk Factors and Signs and Symptoms  Outcome: Ongoing (interventions implemented as appropriate)    Goal: Infection Prevention/Resolution  Outcome: Ongoing (interventions implemented as appropriate)      Problem: Pressure Ulcer (Adult)  Goal: Signs and Symptoms of Listed Potential Problems Will be Absent or Manageable (Pressure Ulcer)  Outcome: Ongoing (interventions implemented as appropriate)      Problem: Mobility, Physical Impaired (Adult)  Goal: Identify Related Risk Factors and Signs and Symptoms  Outcome: Ongoing (interventions implemented as appropriate)    Goal: Enhanced Mobility Skills  Outcome: Ongoing (interventions implemented as appropriate)    Goal: Enhanced Functionality Ability  Outcome: Ongoing (interventions implemented as appropriate)      Problem: Respiratory Insufficiency (Adult)  Goal: Identify Related Risk Factors and Signs and Symptoms  Outcome: Ongoing (interventions implemented as appropriate)    Goal: Acid/Base Balance  Outcome: Ongoing (interventions implemented as appropriate)    Goal: Effective Ventilation  Outcome: Ongoing (interventions implemented as appropriate)      Problem: Pressure Ulcer Risk (Mike Scale) (Adult,Obstetrics,Pediatric)  Goal: Skin Integrity  Outcome: Ongoing (interventions implemented as appropriate)

## 2018-01-14 NOTE — PROGRESS NOTES
I have seen the patient in conjunction with       History of presenting illness:  Patient cannot give a history due to her medical condition.   is here and states that he think she is doing good.    Vital Signs  Temp:  [97.6 °F (36.4 °C)-98.8 °F (37.1 °C)] 98.8 °F (37.1 °C)  Heart Rate:  [51-80] 51  Resp:  [18] 18  BP: ()/(59-65) 121/59  Body mass index is 14.1 kg/(m^2).      Intake/Output Summary (Last 24 hours) at 01/14/18 1533  Last data filed at 01/14/18 0453   Gross per 24 hour   Intake              555 ml   Output                0 ml   Net              555 ml     Intake & Output (last 3 days)       01/11 0701 - 01/12 0700 01/12 0701 - 01/13 0700 01/13 0701 - 01/14 0700 01/14 0701 - 01/15 0700    P.O. 0 0 0     Other 60 548 282     NG/ 1583 298     IV Piggyback 100 100 100     Total Intake(mL/kg) 507 (11.1) 2231 (48.7) 680 (14.8)     Net +507 +2231 +680              Unmeasured Urine Occurrence 5 x 6 x 7 x     Unmeasured Stool Occurrence 0 x 0 x 0 x           Physical exam:  Physical Exam   Constitutional: Well-developed, Thin   Head: Normocephalic and atraumatic.  Mucous membranes more moist  Eyes:  Pupils are equal, round, no scleral icterus  Cardiovascular: Normal rate, regular rhythm and normal heart sounds.  No murmur rub or gallop  Pulmonary/Chest: Bilateral breath sounds clear no rhonchi rales or wheezing her today  Abdominal: Soft, flat, bowel sounds are active, nondistended nontender.  No peritoneal signs   Musculoskeletal: Cannot test strength, SCDs are on, left hand is wrapped  Neurological: No significant change , feel like she is at her baseline.  Skin: Skin is warm and dry.     Telemetry: Sinus rhythm, reviewed, inverted T-wave saturation in the 90s were reviewed    Results Review:  Lab Results   Component Value Date    WBC 6.51 01/14/2018    HGB 13.2 01/14/2018    HCT 41.9 01/14/2018    MCV 92.5 01/14/2018     01/14/2018     Lab Results   Component Value Date     GLUCOSE 107 01/14/2018    BUN 11 01/14/2018    CREATININE 0.70 01/14/2018    EGFRIFNONA 85 01/14/2018    EGFRIFAFRI 121 10/07/2016    BCR 15.7 01/14/2018    CO2 27.7 01/14/2018    CALCIUM 8.9 01/14/2018    ALBUMIN 3.70 01/13/2018    LABIL2 1.1 (L) 01/13/2018    AST 19 01/13/2018    ALT 21 01/13/2018       Imaging Results (last 72 hours)     Procedure Component Value Units Date/Time    XR Chest 1 View [[id number]] Collected:  01/07/18 2355     Updated:  01/07/18 2357    Narrative:       EXAMINATION: XR CHEST 1 VW-      CLINICAL INDICATION:     congestion, soa     TECHNIQUE:  XR CHEST 1 VW-      COMPARISON: NONE      FINDINGS:   Patchy right infrahilar opacities.   Heart and mediastinal contours are unremarkable.   No pneumothorax.   No pleural effusion.   No acute osseous findings.            Impression:       Right infrahilar airspace disease.     This report was finalized on 1/7/2018 11:55 PM by Dr. Rubén Vickers MD.           Chest x-ray image reviewed, improved      Assessment/Plan     Active Problems:  Severe sepsis with hypoxic respiratory failure superimposed on right pneumonia probable aspiration. Patient has shown significant improvement, Zosyn stopped, cultures negative, changed to Augmentin for 7 more days.  Chest x-ray has cleared.  Clinically patient has improved.    Myoclonic movements present on admission, resolved    Hypothyroidism, earlier in the stay Synthroid was slightly over replaced and TSH was low, now it is borderline high, this can be further followed up in about 3 weeks the nursing facility to further sort this out.    Nutrition, tolerating tube feeds at goal rate.    Alzheimer's disease, advanced     Hand vesicles, Bactroban    Hypernatremia, resolved    Bradycardia, blood pressure is adequate, monitor blood pressure remains adequate    Abnormal EKG possible ischemia, on aspirin, no further intervention planned .  Does not appear to be in any discomfort     Insignificant urine colony count  of Klebsiella, no further intervention warranted    Patient is nothing by mouth and has an NG tube.   has been able to discuss with daughters.  In the event that she cannot swallow by repeat speech evaluation tomorrow, he is in agreement with feeding tube.  Therefore if patient cannot pass her swallowing evaluation would recommend feeding tube.    DVT prophylaxis, SCDs are on      Wil Felix MD  01/14/18  3:33 PM

## 2018-01-14 NOTE — PLAN OF CARE
Problem: Patient Care Overview (Adult)  Goal: Plan of Care Review  Outcome: Ongoing (interventions implemented as appropriate)   01/14/18 0119   Coping/Psychosocial Response Interventions   Plan Of Care Reviewed With patient   Patient Care Overview   Progress progress toward functional goals as expected     Goal: Adult Individualization and Mutuality  Outcome: Ongoing (interventions implemented as appropriate)      Problem: Skin Integrity Impairment, Risk/Actual (Adult)  Goal: Identify Related Risk Factors and Signs and Symptoms  Outcome: Ongoing (interventions implemented as appropriate)   01/10/18 1813   Skin Integrity Impairment, Risk/Actual   Skin Integrity Impairment, Risk/Actual: Related Risk Factors cognitive impairment;fluid/nutrition status;immobility   Signs and Symptoms (Skin Integrity Impairment) bulla/blister/vesicle     Goal: Skin Integrity/Wound Healing  Outcome: Ongoing (interventions implemented as appropriate)   01/14/18 0119   Skin Integrity Impairment, Risk/Actual (Adult)   Skin Integrity/Wound Healing making progress toward outcome     Goal: Identify Related Risk Factors and Signs and Symptoms  Outcome: Ongoing (interventions implemented as appropriate)   01/10/18 1813   Skin Integrity Impairment, Risk/Actual   Skin Integrity Impairment, Risk/Actual: Related Risk Factors cognitive impairment;fluid/nutrition status;immobility   Signs and Symptoms (Skin Integrity Impairment) bulla/blister/vesicle     Goal: Skin Integrity/Wound Healing  Outcome: Ongoing (interventions implemented as appropriate)   01/14/18 0119   Skin Integrity Impairment, Risk/Actual (Adult)   Skin Integrity/Wound Healing making progress toward outcome       Problem: Sepsis (Adult)  Goal: Signs and Symptoms of Listed Potential Problems Will be Absent or Manageable (Sepsis)  Outcome: Ongoing (interventions implemented as appropriate)   01/13/18 0427 01/14/18 0119   Sepsis   Problems Assessed (Sepsis) --  all   Problems Present  (Sepsis) malnutrition (undernutrition);progression of infection --        Problem: Fall Risk (Adult)  Goal: Identify Related Risk Factors and Signs and Symptoms  Outcome: Ongoing (interventions implemented as appropriate)   01/10/18 1813 01/12/18 0519   Fall Risk   Fall Risk: Related Risk Factors gait/mobility problems;confusion/agitation;neuro disease/injury --    Fall Risk: Signs and Symptoms --  presence of risk factors     Goal: Absence of Falls  Outcome: Ongoing (interventions implemented as appropriate)   01/14/18 0119   Fall Risk (Adult)   Absence of Falls making progress toward outcome       Problem: Dysphagia (Adult)  Goal: Identify Related Risk Factors and Signs and Symptoms  Outcome: Ongoing (interventions implemented as appropriate)   01/08/18 0240 01/13/18 0427   Dysphagia   Dysphagia: Related Risk Factors functional decline;mental status altered --    General Observations Signs and Symptoms (Dysphagia) --  recurring pneumonia   Oral Phase Dysphagia Signs and Symptoms (Dysphagia) --  facial droop;poor tongue control   Pharyngeal Phase Dysphagia Signs and Symptoms (Dysphagia) --  aspiration clinically evident   Upper Esophageal Phase Dysphagia Signs and Symptoms (Dysphagia) --  difficulty with solid foods     Goal: Functional/Safe Swallow  Outcome: Ongoing (interventions implemented as appropriate)   01/14/18 0119   Dysphagia (Adult)   Functional/Safe Swallow making progress toward outcome     Goal: Compensatory Techniques to Improve Safety/Function with Swallowing  Outcome: Ongoing (interventions implemented as appropriate)   01/14/18 0119   Dysphagia (Adult)   Compensatory Techniques to Improve Safety/Function with Swallowing making progress toward outcome       Problem: Anxiety (Adult)  Goal: Identify Related Risk Factors and Signs and Symptoms  Outcome: Ongoing (interventions implemented as appropriate)   01/08/18 0240   Anxiety   Related Risk Factors (Anxiety) cognitive status;knowledge  deficit;environment change   Signs and Symptoms (Anxiety) body tremors/twitching/restless legs     Goal: Reduction/Resolution  Outcome: Ongoing (interventions implemented as appropriate)   01/14/18 0119   Anxiety (Adult)   Reduction/Resolution making progress toward outcome       Problem: Infection, Risk/Actual (Adult)  Goal: Identify Related Risk Factors and Signs and Symptoms  Outcome: Ongoing (interventions implemented as appropriate)   01/08/18 0240   Infection, Risk/Actual   Infection, Risk/Actual: Related Risk Factors age extremes;chronic illness/condition;exposure to microbes;malnutrition;skin integrity impairment   Signs and Symptoms (Infection, Risk/Actual) malaise;weakness;respiratory rate increase     Goal: Infection Prevention/Resolution  Outcome: Ongoing (interventions implemented as appropriate)   01/14/18 0119   Infection, Risk/Actual (Adult)   Infection Prevention/Resolution making progress toward outcome       Problem: Pressure Ulcer (Adult)  Goal: Signs and Symptoms of Listed Potential Problems Will be Absent or Manageable (Pressure Ulcer)  Outcome: Ongoing (interventions implemented as appropriate)   01/13/18 0427   Pressure Ulcer   Problems Assessed (Pressure Ulcer) all   Problems Present (Pressure Ulcer) wound progression/extension       Problem: Mobility, Physical Impaired (Adult)  Goal: Identify Related Risk Factors and Signs and Symptoms  Outcome: Ongoing (interventions implemented as appropriate)   01/09/18 0046 01/10/18 0107   Mobility, Physical Impaired   Physical Mobility, Impaired: Related Risk Factors --  activity intolerance;disease process;malnutrition;respiratory insufficiency   Signs and Symptoms (Physical Mobility Impaired) limited ability to perform gross/fine motor skills;unsafe transfers/ambulation --      Goal: Enhanced Mobility Skills  Outcome: Ongoing (interventions implemented as appropriate)   01/14/18 0119   Mobility, Physical Impaired (Adult)   Enhanced Mobility Skills  making progress toward outcome     Goal: Enhanced Functionality Ability  Outcome: Ongoing (interventions implemented as appropriate)   01/14/18 0119   Mobility, Physical Impaired (Adult)   Enhanced Functionality Ability making progress toward outcome       Problem: Respiratory Insufficiency (Adult)  Goal: Identify Related Risk Factors and Signs and Symptoms  Outcome: Ongoing (interventions implemented as appropriate)   01/09/18 0046 01/10/18 0107   Respiratory Insufficiency   Related Risk Factors (Respiratory Insufficiency) --  activity intolerance;bedrest   Signs and Symptoms (Respiratory Insufficiency) abnormal breath sounds;cough (productive/ineffective);decreased oxygen saturation;level of consciousness change;respiratory rate alterations;sleep apnea/periodic apnea;sleeplessness/fatigue;use of accessory muscles --      Goal: Acid/Base Balance  Outcome: Ongoing (interventions implemented as appropriate)   01/14/18 0119   Respiratory Insufficiency (Adult)   Acid/Base Balance making progress toward outcome     Goal: Effective Ventilation  Outcome: Ongoing (interventions implemented as appropriate)   01/14/18 0119   Respiratory Insufficiency (Adult)   Effective Ventilation making progress toward outcome       Problem: Pressure Ulcer Risk (Mike Scale) (Adult,Obstetrics,Pediatric)  Goal: Identify Related Risk Factors and Signs and Symptoms  Outcome: Ongoing (interventions implemented as appropriate)   01/13/18 1059   Pressure Ulcer Risk (Mike Scale)   Related Risk Factors (Pressure Ulcer Risk (Mike Scale)) cognitive impairment;mental impairment;mobility impaired;nutritional deficiencies     Goal: Skin Integrity  Outcome: Ongoing (interventions implemented as appropriate)   01/14/18 0119   Pressure Ulcer Risk (Mike Scale) (Adult,Obstetrics,Pediatric)   Skin Integrity making progress toward outcome

## 2018-01-15 LAB
ANION GAP SERPL CALCULATED.3IONS-SCNC: 6.8 MMOL/L (ref 3.6–11.2)
BASOPHILS # BLD AUTO: 0.04 10*3/MM3 (ref 0–0.3)
BASOPHILS NFR BLD AUTO: 0.5 % (ref 0–2)
BUN BLD-MCNC: 10 MG/DL (ref 7–21)
BUN/CREAT SERPL: 18.9 (ref 7–25)
CALCIUM SPEC-SCNC: 8.7 MG/DL (ref 7.7–10)
CHLORIDE SERPL-SCNC: 105 MMOL/L (ref 99–112)
CO2 SERPL-SCNC: 27.2 MMOL/L (ref 24.3–31.9)
CREAT BLD-MCNC: 0.53 MG/DL (ref 0.43–1.29)
CRP SERPL-MCNC: 0.59 MG/DL (ref 0–0.99)
DEPRECATED RDW RBC AUTO: 45.9 FL (ref 37–54)
EOSINOPHIL # BLD AUTO: 0.19 10*3/MM3 (ref 0–0.7)
EOSINOPHIL NFR BLD AUTO: 2.5 % (ref 0–5)
ERYTHROCYTE [DISTWIDTH] IN BLOOD BY AUTOMATED COUNT: 13.9 % (ref 11.5–14.5)
GFR SERPL CREATININE-BSD FRML MDRD: 117 ML/MIN/1.73
GLUCOSE BLD-MCNC: 112 MG/DL (ref 70–110)
HCT VFR BLD AUTO: 40.4 % (ref 37–47)
HGB BLD-MCNC: 12.9 G/DL (ref 12–16)
IMM GRANULOCYTES # BLD: 0.02 10*3/MM3 (ref 0–0.03)
IMM GRANULOCYTES NFR BLD: 0.3 % (ref 0–0.5)
LYMPHOCYTES # BLD AUTO: 1.78 10*3/MM3 (ref 1–3)
LYMPHOCYTES NFR BLD AUTO: 23.8 % (ref 21–51)
MCH RBC QN AUTO: 29.1 PG (ref 27–33)
MCHC RBC AUTO-ENTMCNC: 31.9 G/DL (ref 33–37)
MCV RBC AUTO: 91.2 FL (ref 80–94)
MONOCYTES # BLD AUTO: 1.03 10*3/MM3 (ref 0.1–0.9)
MONOCYTES NFR BLD AUTO: 13.8 % (ref 0–10)
NEUTROPHILS # BLD AUTO: 4.41 10*3/MM3 (ref 1.4–6.5)
NEUTROPHILS NFR BLD AUTO: 59.1 % (ref 30–70)
OSMOLALITY SERPL CALC.SUM OF ELEC: 277.3 MOSM/KG (ref 273–305)
PLATELET # BLD AUTO: 296 10*3/MM3 (ref 130–400)
PMV BLD AUTO: 11.8 FL (ref 6–10)
POTASSIUM BLD-SCNC: 4.2 MMOL/L (ref 3.5–5.3)
RBC # BLD AUTO: 4.43 10*6/MM3 (ref 4.2–5.4)
SODIUM BLD-SCNC: 139 MMOL/L (ref 135–153)
WBC NRBC COR # BLD: 7.47 10*3/MM3 (ref 4.5–12.5)

## 2018-01-15 PROCEDURE — 80048 BASIC METABOLIC PNL TOTAL CA: CPT | Performed by: INTERNAL MEDICINE

## 2018-01-15 PROCEDURE — 86140 C-REACTIVE PROTEIN: CPT | Performed by: INTERNAL MEDICINE

## 2018-01-15 PROCEDURE — 94799 UNLISTED PULMONARY SVC/PX: CPT

## 2018-01-15 PROCEDURE — 99232 SBSQ HOSP IP/OBS MODERATE 35: CPT | Performed by: INTERNAL MEDICINE

## 2018-01-15 PROCEDURE — 85025 COMPLETE CBC W/AUTO DIFF WBC: CPT | Performed by: INTERNAL MEDICINE

## 2018-01-15 RX ADMIN — POTASSIUM & SODIUM PHOSPHATES POWDER PACK 280-160-250 MG 1 PACKET: 280-160-250 PACK at 16:33

## 2018-01-15 RX ADMIN — AMOXICILLIN AND CLAVULANATE POTASSIUM 500 MG: 500; 125 TABLET, FILM COATED ORAL at 08:19

## 2018-01-15 RX ADMIN — Medication 1 CAPSULE: at 08:19

## 2018-01-15 RX ADMIN — POTASSIUM & SODIUM PHOSPHATES POWDER PACK 280-160-250 MG 1 PACKET: 280-160-250 PACK at 08:19

## 2018-01-15 RX ADMIN — LEVOTHYROXINE SODIUM 100 MCG: 100 TABLET ORAL at 05:57

## 2018-01-15 RX ADMIN — BACITRACIN ZINC: 500 OINTMENT TOPICAL at 08:19

## 2018-01-15 RX ADMIN — AMOXICILLIN AND CLAVULANATE POTASSIUM 500 MG: 500; 125 TABLET, FILM COATED ORAL at 22:06

## 2018-01-15 RX ADMIN — ASPIRIN 81 MG: 81 TABLET, CHEWABLE ORAL at 08:19

## 2018-01-15 RX ADMIN — BACITRACIN ZINC: 500 OINTMENT TOPICAL at 22:06

## 2018-01-15 NOTE — PLAN OF CARE
Problem: Patient Care Overview (Adult)  Goal: Plan of Care Review  Outcome: Ongoing (interventions implemented as appropriate)    Goal: Adult Individualization and Mutuality  Outcome: Ongoing (interventions implemented as appropriate)      Problem: Skin Integrity Impairment, Risk/Actual (Adult)  Goal: Identify Related Risk Factors and Signs and Symptoms  Outcome: Ongoing (interventions implemented as appropriate)    Goal: Skin Integrity/Wound Healing  Outcome: Ongoing (interventions implemented as appropriate)      Problem: Sepsis (Adult)  Goal: Signs and Symptoms of Listed Potential Problems Will be Absent or Manageable (Sepsis)  Outcome: Ongoing (interventions implemented as appropriate)      Problem: Fall Risk (Adult)  Goal: Identify Related Risk Factors and Signs and Symptoms  Outcome: Ongoing (interventions implemented as appropriate)

## 2018-01-15 NOTE — PROGRESS NOTES
[facility]     Patient Identification:  Name:  Baldo Eden  Age:  62 y.o.  Sex:  female  :  1955  MRN:  [id number]  Visit Number:  [id number]  Primary Care Provider:  Kashmir Garcia MD    Length of stay:  7    Chief complaint:  Unable to obtain    Subjective:  Mrs. eden is essentially nonresponsive and unable to give any history.  She's shows no grimacing or moaning on exam.  The nursing staff has reported no new associated symptoms.  No family is available  ----------------------------------------------------------------------------------------------------------------------  Current Hospital Meds:    amoxicillin-clavulanate 1 tablet Nasogastric Q12H   aspirin 81 mg Oral Daily   bacitracin  Topical Q12H   lactobacillus acidophilus 1 capsule Per G Tube Daily   levothyroxine 100 mcg Nasogastric Q AM   potassium & sodium phosphates 1 packet Oral BID AC        ----------------------------------------------------------------------------------------------------------------------  Vital Signs:  Temp:  [97.7 °F (36.5 °C)-98.9 °F (37.2 °C)] 97.7 °F (36.5 °C)  Heart Rate:  [54-78] 63  Resp:  [18-20] 18  BP: (103-119)/(50-72) 105/50  Last 3 weights    01/10/18  0500 18  0517 18  0500   Weight: 45.4 kg (100 lb 0.3 oz) 50 kg (110 lb 3.2 oz) 45.9 kg (101 lb 1.6 oz)     Body mass index is 14.1 kg/(m^2).    Intake/Output Summary (Last 24 hours) at 01/15/18 1801  Last data filed at 01/15/18 0613   Gross per 24 hour   Intake              720 ml   Output                0 ml   Net              720 ml     Jevity 1.2 Juan David; Tube Feeding Type: Continuous; Continuous Tube Feeding Start Rate (mL/hr): 10; Then Advance Rate By (mL/hr): 10; Every __ Hours: 6; To Goal Rate of (mL/hr): 30; Patient is NPO (No Tray)  ----------------------------------------------------------------------------------------------------------------------  Physical exam:  Constitutional:  Severely  cachectic and minimally responsive.  Opens eyes briefly to verbal stimuli    HENT:  Head:  Normocephalic and atraumatic.  Mouth:  Moist mucous membranes.    Eyes:  Conjunctivae and EOM are normal.  Pupils are equal, round, and reactive to light.  No scleral icterus.    Neck:  Neck supple.  No JVD present.    Cardiovascular:  Normal rate, regular rhythm and normal heart sounds with no murmur.  Pulmonary/Chest:  No respiratory distress, no wheezes, no crackles, with normal breath sounds and good air movement.  Abdominal:  Scaphoid Soft.  Bowel sounds are normal.  No distension and no tenderness.   Musculoskeletal: Contractures.  No edema, no tenderness, and no deformity.  No red or swollen joints anywhere.    Neurological:   No cranial nerve deficit.  No tongue deviation.  No facial droop.  Moves all extremities  Skin:  Skin is warm and dry. No rash noted. No pallor.   Peripheral vascular:  Operable pulses in all 4 extremities with no clubbing, no cyanosis, no edema.  Genitourinary:  ----------------------------------------------------------------------------------------------------------------------  Tele:    ----------------------------------------------------------------------------------------------------------------------    Results from last 7 days  Lab Units 01/09/18  0021   CK TOTAL U/L 189*   CKMB ng/mL 0.18   CK MB INDEX % 0.1   TROPONIN I ng/mL 0.016       Results from last 7 days  Lab Units 01/15/18  0156 01/14/18  0413 01/13/18  0153 01/12/18  0435  01/10/18  0057   CRP mg/dL 0.59  --   --  2.70*  --  5.68*   WBC 10*3/mm3 7.47 6.51 6.62 6.93  < > 9.67   HEMOGLOBIN g/dL 12.9 13.2 12.8 13.7  < > 12.7   HEMATOCRIT % 40.4 41.9 40.4 43.1  < > 39.9   MCV fL 91.2 92.5 92.9 91.5  < > 92.8   MCHC g/dL 31.9* 31.5* 31.7* 31.8*  < > 31.8*   PLATELETS 10*3/mm3 296 274 257 225  < > 159   < > = values in this interval not displayed.        Results from last 7 days  Lab Units 01/15/18  0156 01/14/18  0413 01/13/18  0153  01/12/18  0435  01/10/18  0057   SODIUM mmol/L 139 140 139 138  < > 142   POTASSIUM mmol/L 4.2 4.9 4.0 4.3  < > 3.6   MAGNESIUM mg/dL  --  2.6 2.6 2.6  < > 2.1   CHLORIDE mmol/L 105 105 109 109  < > 113*   CO2 mmol/L 27.2 27.7 25.7 23.3*  < > 23.8*   BUN mg/dL 10 11 9 7  < > 6*   CREATININE mg/dL 0.53 0.70 0.54 0.50  < > 0.45   EGFR IF NONAFRICN AM mL/min/1.73 117 85 114 125  < > 141   CALCIUM mg/dL 8.7 8.9 8.9 9.1  < > 8.6   GLUCOSE mg/dL 112* 107 109 103  < > 118*   ALBUMIN g/dL  --   --  3.70 3.40  --  3.20*   BILIRUBIN mg/dL  --   --  0.5 0.5  --  1.0   ALK PHOS U/L  --   --  66 66  --  66   AST (SGOT) U/L  --   --  19 22  --  24   ALT (SGPT) U/L  --   --  21 20  --  25   < > = values in this interval not displayed.Estimated Creatinine Clearance: 79.7 mL/min (by C-G formula based on Cr of 0.53).    No results found for: AMMONIA                    I have personally looked at the labs and they are summarized above.  ----------------------------------------------------------------------------------------------------------------------  Imaging Results (last 24 hours)     ** No results found for the last 24 hours. **        ----------------------------------------------------------------------------------------------------------------------  Assessment and Plan:  Aspiration pneumonia  Acute hypoxic respiratory failure  Severe sepsis, resolved  Oxygen, supportive care, when necessary bronchodilators  Complete course of Augmentin    End-stage Alzheimer's dementia  Adult failure to thrive  Severe protein calorie malnutrition  The family is highly contemplating the feeding tube.  We'll find out the 's decision tomorrow and proceed accordingly.  Currently working on placement since her  does not want to return to their previous nursing home  Continue tube feeding, Supportive care and symptom control  Palliative care is involved    DVT prophylaxis SCDs      Yordan Knox MD  01/15/18  6:01 PM

## 2018-01-15 NOTE — SIGNIFICANT NOTE
01/15/18 0917   Rehab Treatment   Discipline speech language pathologist   Rehab Evaluation   Evaluation Not Performed other (see comments)   Recommendations   SLP - Next Appointment 01/15/18     Consult received. Chart reviewed. Mrs. Eden is familiar to SLP for previous f/u, however, unable to safely accept po or functionally participate in instrumental dysphagia evaluation. MD provided v/o to discontinue further SLP f/u at this time pending improvement in medical status. She continues NPO w/ NG tube for alternative method of nutrition/hydration/medication. Noted MD progress note indicating SLP order to reassess candidacy for po intake per improvement in overall medical status w/ PEG placement pending. Noted CXR 1/14/18 w/ improvement in R lung interstitial opacities per radiologist.       Mrs. Eden is seen at bedside this am. Sitter is present. She tolerates O2 via NC. She is observed resting comfortably in bed w/ open mouth posture. NG tube in place. Pt is not arousable to max verbal and tactile/sternal stimulation this am. Sitter reports this status is consistent across this am. Per this status, pt is unable to functionally participate in formal dysphagia evaluation at this time. Please continue NPO w/ alternative methods at this time, meds via non-oral method, universal aspiration precautions, ORQUIDEA precautions, oral care protocol.     SLP to f/u for pt status/reassessment w/ formal dysphagia evaluation pending improvement in status to functionally participate.     D/w RNRichelle, via telephone pt status w/ verbal understanding and agreement. She verbalizes agreement to contact SLP is pt presents w/ a/a status to participate in dysphagia evaluation.     Thank you-  Yuki Holman M.A., CCC-SLP

## 2018-01-15 NOTE — PLAN OF CARE
Problem: Patient Care Overview (Adult)  Goal: Plan of Care Review  Outcome: Ongoing (interventions implemented as appropriate)   01/14/18 2200   Coping/Psychosocial Response Interventions   Plan Of Care Reviewed With patient   Patient Care Overview   Progress progress toward functional goals as expected     Goal: Adult Individualization and Mutuality  Outcome: Ongoing (interventions implemented as appropriate)      Problem: Skin Integrity Impairment, Risk/Actual (Adult)  Goal: Identify Related Risk Factors and Signs and Symptoms  Outcome: Ongoing (interventions implemented as appropriate)   01/10/18 1813   Skin Integrity Impairment, Risk/Actual   Skin Integrity Impairment, Risk/Actual: Related Risk Factors cognitive impairment;fluid/nutrition status;immobility   Signs and Symptoms (Skin Integrity Impairment) bulla/blister/vesicle     Goal: Skin Integrity/Wound Healing  Outcome: Ongoing (interventions implemented as appropriate)   01/14/18 2200   Skin Integrity Impairment, Risk/Actual (Adult)   Skin Integrity/Wound Healing making progress toward outcome     Goal: Identify Related Risk Factors and Signs and Symptoms  Outcome: Ongoing (interventions implemented as appropriate)   01/10/18 1813   Skin Integrity Impairment, Risk/Actual   Skin Integrity Impairment, Risk/Actual: Related Risk Factors cognitive impairment;fluid/nutrition status;immobility   Signs and Symptoms (Skin Integrity Impairment) bulla/blister/vesicle     Goal: Skin Integrity/Wound Healing  Outcome: Ongoing (interventions implemented as appropriate)   01/14/18 2200   Skin Integrity Impairment, Risk/Actual (Adult)   Skin Integrity/Wound Healing making progress toward outcome       Problem: Sepsis (Adult)  Goal: Signs and Symptoms of Listed Potential Problems Will be Absent or Manageable (Sepsis)  Outcome: Ongoing (interventions implemented as appropriate)   01/13/18 0427 01/14/18 0119   Sepsis   Problems Assessed (Sepsis) --  all   Problems Present  (Sepsis) malnutrition (undernutrition);progression of infection --        Problem: Fall Risk (Adult)  Goal: Identify Related Risk Factors and Signs and Symptoms  Outcome: Ongoing (interventions implemented as appropriate)   01/10/18 1813 01/12/18 0519   Fall Risk   Fall Risk: Related Risk Factors gait/mobility problems;confusion/agitation;neuro disease/injury --    Fall Risk: Signs and Symptoms --  presence of risk factors     Goal: Absence of Falls  Outcome: Ongoing (interventions implemented as appropriate)   01/14/18 2200   Fall Risk (Adult)   Absence of Falls making progress toward outcome       Problem: Dysphagia (Adult)  Goal: Identify Related Risk Factors and Signs and Symptoms  Outcome: Ongoing (interventions implemented as appropriate)   01/08/18 0240 01/13/18 0427   Dysphagia   Dysphagia: Related Risk Factors functional decline;mental status altered --    General Observations Signs and Symptoms (Dysphagia) --  recurring pneumonia   Oral Phase Dysphagia Signs and Symptoms (Dysphagia) --  facial droop;poor tongue control   Pharyngeal Phase Dysphagia Signs and Symptoms (Dysphagia) --  aspiration clinically evident   Upper Esophageal Phase Dysphagia Signs and Symptoms (Dysphagia) --  difficulty with solid foods     Goal: Functional/Safe Swallow  Outcome: Ongoing (interventions implemented as appropriate)   01/14/18 2200   Dysphagia (Adult)   Functional/Safe Swallow making progress toward outcome     Goal: Compensatory Techniques to Improve Safety/Function with Swallowing  Outcome: Ongoing (interventions implemented as appropriate)   01/14/18 2200   Dysphagia (Adult)   Compensatory Techniques to Improve Safety/Function with Swallowing making progress toward outcome       Problem: Anxiety (Adult)  Goal: Identify Related Risk Factors and Signs and Symptoms  Outcome: Ongoing (interventions implemented as appropriate)   01/08/18 0240   Anxiety   Related Risk Factors (Anxiety) cognitive status;knowledge  deficit;environment change   Signs and Symptoms (Anxiety) body tremors/twitching/restless legs     Goal: Reduction/Resolution  Outcome: Ongoing (interventions implemented as appropriate)   01/14/18 2200   Anxiety (Adult)   Reduction/Resolution making progress toward outcome       Problem: Infection, Risk/Actual (Adult)  Goal: Identify Related Risk Factors and Signs and Symptoms  Outcome: Ongoing (interventions implemented as appropriate)   01/08/18 0240   Infection, Risk/Actual   Infection, Risk/Actual: Related Risk Factors age extremes;chronic illness/condition;exposure to microbes;malnutrition;skin integrity impairment   Signs and Symptoms (Infection, Risk/Actual) malaise;weakness;respiratory rate increase     Goal: Infection Prevention/Resolution  Outcome: Ongoing (interventions implemented as appropriate)   01/14/18 2200   Infection, Risk/Actual (Adult)   Infection Prevention/Resolution making progress toward outcome       Problem: Pressure Ulcer (Adult)  Goal: Signs and Symptoms of Listed Potential Problems Will be Absent or Manageable (Pressure Ulcer)  Outcome: Ongoing (interventions implemented as appropriate)   01/14/18 2200   Pressure Ulcer   Problems Assessed (Pressure Ulcer) all   Problems Present (Pressure Ulcer) wound progression/extension       Problem: Mobility, Physical Impaired (Adult)  Goal: Identify Related Risk Factors and Signs and Symptoms  Outcome: Ongoing (interventions implemented as appropriate)   01/09/18 0046 01/10/18 0107   Mobility, Physical Impaired   Physical Mobility, Impaired: Related Risk Factors --  activity intolerance;disease process;malnutrition;respiratory insufficiency   Signs and Symptoms (Physical Mobility Impaired) limited ability to perform gross/fine motor skills;unsafe transfers/ambulation --      Goal: Enhanced Mobility Skills  Outcome: Ongoing (interventions implemented as appropriate)   01/14/18 2200   Mobility, Physical Impaired (Adult)   Enhanced Mobility Skills  making progress toward outcome     Goal: Enhanced Functionality Ability  Outcome: Ongoing (interventions implemented as appropriate)   01/14/18 2200   Mobility, Physical Impaired (Adult)   Enhanced Functionality Ability making progress toward outcome       Problem: Respiratory Insufficiency (Adult)  Goal: Identify Related Risk Factors and Signs and Symptoms  Outcome: Ongoing (interventions implemented as appropriate)   01/09/18 0046 01/10/18 0107   Respiratory Insufficiency   Related Risk Factors (Respiratory Insufficiency) --  activity intolerance;bedrest   Signs and Symptoms (Respiratory Insufficiency) abnormal breath sounds;cough (productive/ineffective);decreased oxygen saturation;level of consciousness change;respiratory rate alterations;sleep apnea/periodic apnea;sleeplessness/fatigue;use of accessory muscles --      Goal: Acid/Base Balance  Outcome: Ongoing (interventions implemented as appropriate)   01/14/18 2200   Respiratory Insufficiency (Adult)   Acid/Base Balance making progress toward outcome     Goal: Effective Ventilation  Outcome: Ongoing (interventions implemented as appropriate)   01/14/18 2200   Respiratory Insufficiency (Adult)   Effective Ventilation making progress toward outcome       Problem: Pressure Ulcer Risk (Mike Scale) (Adult,Obstetrics,Pediatric)  Goal: Identify Related Risk Factors and Signs and Symptoms   01/13/18 1059   Pressure Ulcer Risk (Mike Scale)   Related Risk Factors (Pressure Ulcer Risk (Mike Scale)) cognitive impairment;mental impairment;mobility impaired;nutritional deficiencies     Goal: Skin Integrity  Outcome: Ongoing (interventions implemented as appropriate)   01/14/18 2200   Pressure Ulcer Risk (Mike Scale) (Adult,Obstetrics,Pediatric)   Skin Integrity making progress toward outcome

## 2018-01-15 NOTE — PROGRESS NOTES
Discharge Planning Assessment   Dominic     Patient Name: Baldo Eden  MRN: [id number]  Today's Date: 1/15/2018    Admit Date: 1/7/2018       Discharge Plan       01/15/18 0845    Case Management/Social Work Plan    Plan SS received consult patient's  on discharge wishes to switch her from [place] to [facility]. SS attempted contact with [facility] per Moises and left voice message. SS to follow.     11:10 SS spoke to Yaneth at [facility] who states there is no bed available. SS contacted spouse, Nika Beard at [phone] who is agreeable for pt to return at discharge to [facility]. Spouse request pt be placed on waiting list at [facility]. SS contacted [facility] and left voice message regarding pt being placed on waiting list. SS to follow.         Discharge Placement     Facility/Agency Request Status Selected? Address Phone Number Fax Number    [facility] Pending - No Request Sent     [id number] [place] [address] [phone]        Expected Discharge Date and Time     Expected Discharge Date Expected Discharge Time    Omar 15, 2018           Lu Mayo

## 2018-01-15 NOTE — PROGRESS NOTES
Pt appears comfortable. No family at bedside. Review of notes indicates  agreeable to PEG placement if necessary.     Also note possible change to the [facility] if bed is available at 's request.     We will continue to follow along. Please do not hesitate to contact us regarding further sx mgmt or GOC needs, including after hours or on weekends via our on call provider at [phone].        Debora Izquierdo MD    1/15/2018

## 2018-01-16 PROCEDURE — 94799 UNLISTED PULMONARY SVC/PX: CPT

## 2018-01-16 PROCEDURE — 99254 IP/OBS CNSLTJ NEW/EST MOD 60: CPT | Performed by: PHYSICIAN ASSISTANT

## 2018-01-16 PROCEDURE — 99232 SBSQ HOSP IP/OBS MODERATE 35: CPT | Performed by: INTERNAL MEDICINE

## 2018-01-16 RX ADMIN — POTASSIUM & SODIUM PHOSPHATES POWDER PACK 280-160-250 MG 1 PACKET: 280-160-250 PACK at 17:05

## 2018-01-16 RX ADMIN — BACITRACIN ZINC: 500 OINTMENT TOPICAL at 19:26

## 2018-01-16 RX ADMIN — IPRATROPIUM BROMIDE AND ALBUTEROL SULFATE 3 ML: .5; 3 SOLUTION RESPIRATORY (INHALATION) at 07:42

## 2018-01-16 RX ADMIN — ACETAMINOPHEN 650 MG: 650 SOLUTION ORAL at 14:12

## 2018-01-16 RX ADMIN — AMOXICILLIN AND CLAVULANATE POTASSIUM 500 MG: 500; 125 TABLET, FILM COATED ORAL at 09:01

## 2018-01-16 RX ADMIN — POTASSIUM & SODIUM PHOSPHATES POWDER PACK 280-160-250 MG 1 PACKET: 280-160-250 PACK at 19:25

## 2018-01-16 RX ADMIN — POTASSIUM & SODIUM PHOSPHATES POWDER PACK 280-160-250 MG 1 PACKET: 280-160-250 PACK at 06:14

## 2018-01-16 RX ADMIN — ASPIRIN 81 MG: 81 TABLET, CHEWABLE ORAL at 09:01

## 2018-01-16 RX ADMIN — Medication 1 CAPSULE: at 09:01

## 2018-01-16 RX ADMIN — LEVOTHYROXINE SODIUM 100 MCG: 100 TABLET ORAL at 06:14

## 2018-01-16 RX ADMIN — ACETAMINOPHEN 650 MG: 650 SOLUTION ORAL at 20:39

## 2018-01-16 RX ADMIN — AMOXICILLIN AND CLAVULANATE POTASSIUM 500 MG: 500; 125 TABLET, FILM COATED ORAL at 19:25

## 2018-01-16 RX ADMIN — BACITRACIN ZINC: 500 OINTMENT TOPICAL at 09:01

## 2018-01-16 NOTE — PLAN OF CARE
Problem: Skin Integrity Impairment, Risk/Actual (Adult)  Goal: Identify Related Risk Factors and Signs and Symptoms  Outcome: Ongoing (interventions implemented as appropriate)    Goal: Skin Integrity/Wound Healing  Outcome: Ongoing (interventions implemented as appropriate)    Goal: Identify Related Risk Factors and Signs and Symptoms  Outcome: Ongoing (interventions implemented as appropriate)    Goal: Skin Integrity/Wound Healing  Outcome: Ongoing (interventions implemented as appropriate)      Problem: Fall Risk (Adult)  Goal: Identify Related Risk Factors and Signs and Symptoms  Outcome: Ongoing (interventions implemented as appropriate)    Goal: Absence of Falls  Outcome: Ongoing (interventions implemented as appropriate)      Problem: Dysphagia (Adult)  Goal: Identify Related Risk Factors and Signs and Symptoms  Outcome: Ongoing (interventions implemented as appropriate)    Goal: Functional/Safe Swallow  Outcome: Ongoing (interventions implemented as appropriate)      Problem: Anxiety (Adult)  Goal: Identify Related Risk Factors and Signs and Symptoms  Outcome: Ongoing (interventions implemented as appropriate)    Goal: Reduction/Resolution  Outcome: Ongoing (interventions implemented as appropriate)      Problem: Infection, Risk/Actual (Adult)  Goal: Identify Related Risk Factors and Signs and Symptoms  Outcome: Ongoing (interventions implemented as appropriate)    Goal: Infection Prevention/Resolution  Outcome: Ongoing (interventions implemented as appropriate)      Problem: Pressure Ulcer (Adult)  Goal: Signs and Symptoms of Listed Potential Problems Will be Absent or Manageable (Pressure Ulcer)  Outcome: Ongoing (interventions implemented as appropriate)      Problem: Mobility, Physical Impaired (Adult)  Goal: Identify Related Risk Factors and Signs and Symptoms  Outcome: Ongoing (interventions implemented as appropriate)    Goal: Enhanced Mobility Skills  Outcome: Ongoing (interventions implemented as  appropriate)    Goal: Enhanced Functionality Ability  Outcome: Ongoing (interventions implemented as appropriate)      Problem: Pressure Ulcer Risk (Mike Scale) (Adult,Obstetrics,Pediatric)  Goal: Identify Related Risk Factors and Signs and Symptoms  Outcome: Ongoing (interventions implemented as appropriate)    Goal: Skin Integrity  Outcome: Ongoing (interventions implemented as appropriate)

## 2018-01-16 NOTE — NURSING NOTE
SPOKE WITH SPOUSE OF PT, LEON THO. HE STATES THAT HIM AND THE FAMILY HAVE DISCUSSED THE G-TUBE AND THEY WANT PT TO HAVE ONE. RELAYED TO DR CARLOS.

## 2018-01-16 NOTE — PROGRESS NOTES
[facility]     Patient Identification:  Name:  Baldo Eden  Age:  62 y.o.  Sex:  female  :  1955  MRN:  [id number]  Visit Number:  [id number]  Primary Care Provider:  Kashmir Garcia MD    Length of stay:  8    Chief complaint:  Unable to assess    Subjective:  Mrs. Eden is nonverbal.  She is doing a little bit of grunting and moaning as a nurse performs oral care.  There is no family available at bedside today.  The nursing staff has noticed no new issues.  ----------------------------------------------------------------------------------------------------------------------  Current Hospital Meds:    amoxicillin-clavulanate 1 tablet Nasogastric Q12H   aspirin 81 mg Oral Daily   bacitracin  Topical Q12H   lactobacillus acidophilus 1 capsule Per G Tube Daily   levothyroxine 100 mcg Nasogastric Q AM   potassium & sodium phosphates 1 packet Oral BID AC        ----------------------------------------------------------------------------------------------------------------------  Vital Signs:  Temp:  [97.9 °F (36.6 °C)-98.3 °F (36.8 °C)] 98.3 °F (36.8 °C)  Heart Rate:  [51-82] 77  Resp:  [18-20] 20  BP: ()/(41-68) 90/60  Last 3 weights    01/10/18  0500 18  0517 18  0500   Weight: 45.4 kg (100 lb 0.3 oz) 50 kg (110 lb 3.2 oz) 45.9 kg (101 lb 1.6 oz)     Body mass index is 14.1 kg/(m^2).    Intake/Output Summary (Last 24 hours) at 18 1336  Last data filed at 18 0600   Gross per 24 hour   Intake             1383 ml   Output                0 ml   Net             1383 ml     Jevity 1.2 Juan David; Tube Feeding Type: Continuous; Continuous Tube Feeding Start Rate (mL/hr): 10; Then Advance Rate By (mL/hr): 10; Every __ Hours: 6; To Goal Rate of (mL/hr): 30; Patient is NPO (No Tray)  ----------------------------------------------------------------------------------------------------------------------  Physical exam:  Constitutional:  Cachectic and  nonverbal.  No apparent distress. Appropriately interactive.  Mood appears normal.     HENT:  Head:  Normocephalic and atraumatic.  Mouth:  Moist mucous membranes.  Grinding teeth during examination  Eyes:  Conjunctivae and EOM are normal.  Pupils are equal, round, and reactive to light.  No scleral icterus.    Neck:  Neck supple.  No JVD present.    Cardiovascular:  Normal rate, regular rhythm and normal heart sounds with no murmur.  Pulmonary/Chest:  No respiratory distress, no wheezes, no crackles, with normal breath sounds and good air movement.  Abdominal:  Soft.  Bowel sounds are normal.  No distension and no tenderness.   Musculoskeletal:  Significant muscle atrophy throughout.  No edema, no tenderness, and no deformity.  No red or swollen joints anywhere.    Neurological:  All extremities equally.  No cranial nerve deficit.  No tongue deviation.  No facial droop.   Skin:  Skin is warm and dry. No rash noted. No pallor.   Peripheral vascular:  Strong pulses in all 4 extremities with no clubbing, no cyanosis, no edema.  Genitourinary:  ----------------------------------------------------------------------------------------------------------------------  Tele:  View by me and shows sinus rhythm with occasional PJCs  ----------------------------------------------------------------------------------------------------------------------        Results from last 7 days  Lab Units 01/15/18  0156 01/14/18  0413 01/13/18  0153 01/12/18  0435  01/10/18  0057   CRP mg/dL 0.59  --   --  2.70*  --  5.68*   WBC 10*3/mm3 7.47 6.51 6.62 6.93  < > 9.67   HEMOGLOBIN g/dL 12.9 13.2 12.8 13.7  < > 12.7   HEMATOCRIT % 40.4 41.9 40.4 43.1  < > 39.9   MCV fL 91.2 92.5 92.9 91.5  < > 92.8   MCHC g/dL 31.9* 31.5* 31.7* 31.8*  < > 31.8*   PLATELETS 10*3/mm3 296 274 257 225  < > 159   < > = values in this interval not displayed.        Results from last 7 days  Lab Units 01/15/18  0156 01/14/18  0413 01/13/18  0153 01/12/18  0435   01/10/18  0057   SODIUM mmol/L 139 140 139 138  < > 142   POTASSIUM mmol/L 4.2 4.9 4.0 4.3  < > 3.6   MAGNESIUM mg/dL  --  2.6 2.6 2.6  < > 2.1   CHLORIDE mmol/L 105 105 109 109  < > 113*   CO2 mmol/L 27.2 27.7 25.7 23.3*  < > 23.8*   BUN mg/dL 10 11 9 7  < > 6*   CREATININE mg/dL 0.53 0.70 0.54 0.50  < > 0.45   EGFR IF NONAFRICN AM mL/min/1.73 117 85 114 125  < > 141   CALCIUM mg/dL 8.7 8.9 8.9 9.1  < > 8.6   GLUCOSE mg/dL 112* 107 109 103  < > 118*   ALBUMIN g/dL  --   --  3.70 3.40  --  3.20*   BILIRUBIN mg/dL  --   --  0.5 0.5  --  1.0   ALK PHOS U/L  --   --  66 66  --  66   AST (SGOT) U/L  --   --  19 22  --  24   ALT (SGPT) U/L  --   --  21 20  --  25   < > = values in this interval not displayed.Estimated Creatinine Clearance: 79.7 mL/min (by C-G formula based on Cr of 0.53).    No results found for: AMMONIA                    I have personally looked at the labs and they are summarized above.  ----------------------------------------------------------------------------------------------------------------------  Imaging Results (last 24 hours)     ** No results found for the last 24 hours. **        ----------------------------------------------------------------------------------------------------------------------  Assessment and Plan:  Aspiration pneumonia  Acute hypoxic respiratory failure  Severe sepsis, resolved  Oxygen, supportive care, when necessary bronchodilators  Complete course of Augmentin (2 more days)     End-stage Alzheimer's dementia  Adult failure to thrive  Severe protein calorie malnutrition  We are still awaiting the 's decision on feeding tube placement we will try to contact them by phone  Currently working on placement since her  does not want to return to their previous nursing home  Continue NG tube feeding, Supportive care and symptom control  Palliative care is following as well     DVT prophylaxis SCDs        Yordan Knox MD  01/16/18  1:36 PM

## 2018-01-16 NOTE — CONSULTS
01/16/18  Chief Complaint   Patient presents with   • Aspiration     Baldo Eden is a 62 y.o. female who presents today at the request of Dr. Knox for G-tube placement for malnutrition    HPI  The patient was seen for a GI evaluation for G-tube placement for malnutrition.  She is a resident of [facility].  Patient has dementia and is nonverbal and unable to provide case history information.  History was obtained from medical chart and staff.  Patient was transferred from the nursing home to the ED on 1/7/18 due to choking with PO intake.  She was seen by speech therapy and was declared unsafe for PO intake.  Patient has developed aspiration pneumonia due to aspiration.  She has been receiving nutrition/hydration via NG tube.  Medical, surgical, social, and family histories were reviewed and are listed below.        Review of Systems  History unable to be obtained from the patient due to dementia      ACTIVE PROBLEMS:   Specialty Problems     None          PAST MEDICAL HISTORY:  Past Medical History:   Diagnosis Date   • Alzheimer disease    • Anxiety    • Dementia    • Dermatopolymyositis    • Dysphagia    • Hypertension    • Hypothyroid    • Malnutrition    • Muscle spasm    • Quadriparesis    • Vitamin D deficiency        SURGICAL HISTORY:  History reviewed. No pertinent surgical history.    FAMILY HISTORY:  Family History   Problem Relation Age of Onset   • Family history unknown: Yes       SOCIAL HISTORY:  Social History   Substance Use Topics   • Smoking status: Never Smoker   • Smokeless tobacco: Never Used   • Alcohol use No       CURRENT MEDICATION:    Current Facility-Administered Medications:   •  acetaminophen (TYLENOL) 160 MG/5ML solution 650 mg, 650 mg, Nasogastric, Q6H PRN, Wil Felix MD, 650 mg at 01/16/18 1412  •  amoxicillin-clavulanate (AUGMENTIN) 500-125 MG per tablet 500 mg, 1 tablet, Nasogastric, Q12H, Wil Felix MD, 500 mg at 01/16/18 0901  •  aspirin chewable tablet 81  mg, 81 mg, Oral, Daily, Wil Felix MD, 81 mg at 01/16/18 0901  •  bacitracin ointment, , Topical, Q12H, Wil Felix MD  •  ipratropium-albuterol (DUO-NEB) nebulizer solution 3 mL, 3 mL, Nebulization, Q6H PRN, Tammy Bueno MD, 3 mL at 01/16/18 0742  •  lactobacillus acidophilus (RISAQUAD) capsule 1 capsule, 1 capsule, Per G Tube, Daily, Rand Benavidez, [facility], 1 capsule at 01/16/18 0901  •  levothyroxine (SYNTHROID, LEVOTHROID) tablet 100 mcg, 100 mcg, Nasogastric, Q AM, Wil Felix MD, 100 mcg at 01/16/18 0614  •  Magnesium Sulfate 2 gram Bolus, followed by 8 gram infusion (total Mg dose 10 grams)- Mg less than or equal to 1mg/dL, 2 g, Intravenous, PRN **OR** Magnesium Sulfate 6 gram Infusion (2 gm x 3) -Mg 1.1 -1.5 mg/dL, 2 g, Intravenous, PRN **OR** magnesium sulfate 4 gram infusion- Mg 1.6-1.9 mg/dL, 4 g, Intravenous, PRN, Alba Johnson PA-C  •  potassium & sodium phosphates (PHOS-NAK) oral packet, 1 packet, Oral, BID AC, Rafi Kidd MD, 1 packet at 01/16/18 0614  •  potassium chloride (MICRO-K) CR capsule 40 mEq, 40 mEq, Oral, PRN **OR** potassium chloride (KLOR-CON) packet 40 mEq, 40 mEq, Oral, PRN, 40 mEq at 01/12/18 0638 **OR** potassium chloride 10 mEq in 100 mL IVPB, 10 mEq, Intravenous, Q1H PRN, Alba Johnson PA-C  •  potassium phosphate 45 mmol in sodium chloride 0.9 % 500 mL infusion, 45 mmol, Intravenous, PRN **OR** potassium phosphate 30 mmol in sodium chloride 0.9 % 250 mL infusion, 30 mmol, Intravenous, PRN **OR** potassium phosphate 15 mmol in sodium chloride 0.9 % 100 mL infusion, 15 mmol, Intravenous, PRN **OR** sodium phosphates 45 mmol in sodium chloride 0.9 % 500 mL IVPB, 45 mmol, Intravenous, PRN **OR** sodium phosphates 30 mmol in sodium chloride 0.9 % 250 mL IVPB, 30 mmol, Intravenous, PRN **OR** sodium phosphates 15 mmol in sodium chloride 0.9 % 250 mL IVPB, 15 mmol, Intravenous, PRN, Alba Johnson PA-C  •  sodium chloride 0.9 % flush 1-10 mL, 1-10 mL,  Intravenous, PRN, Tammy Bueno MD  •  Insert peripheral IV, , , Once **AND** sodium chloride 0.9 % flush 10 mL, 10 mL, Intravenous, PRN, JOANA Velásquez    ALLERGIES:  Aricept [donepezil hcl]     VITAL SIGNS:  Vital Signs (last 24 hours)       01/15 0700  -  01/16 0659 01/16 0700  -  01/16 1542   Most Recent    Temp (°F) 97.7 -  98.2    97.9 -  98.4     98.4 (36.9)    Heart Rate 54 -  82    51 -  77     75    Resp   18    18 -  20     20    BP 95/68 -  109/58    90/60 -  113/77     113/77    SpO2 (%) 96 -  99      99     99          PHYSICAL EXAMINATION    General Appearance:    Alert, nonverbal, in no acute distress; thin-appearing   Head:    Normocephalic, without obvious abnormality, atraumatic   Eyes:            Lids and lashes normal, conjunctivae and sclerae normal, no   icterus, no pallor, corneas clear, PERRLA   Ears:    Ears appear intact with no abnormalities noted   Throat:   No oral lesions, no thrush, oral mucosa moist   Neck:   No adenopathy, supple, trachea midline, no thyromegaly, no   carotid bruit, no JVD   Back:     No kyphosis present, no scoliosis present, no skin lesions,      erythema or scars, no tenderness to percussion or                   palpation,   range of motion normal   Lungs:     Clear to auscultation,respirations regular, even and                  unlabored    Heart:    Regular rhythm and normal rate, normal S1 and S2, no            murmur, no gallop, no rub, no click   Chest Wall:    No abnormalities observed   Abdomen:     Normal bowel sounds, no masses, no organomegaly, soft        non-tender, non-distended, no guarding, no rebound                tenderness   Rectal:     Deferred   Extremities:   Decreased strength;  no edema, no cyanosis, no             redness   Pulses:   Pulses palpable and equal bilaterally   Skin:   No bleeding, bruising or rash   Lymph nodes:   No palpable adenopathy   Neurologic:   Confused and nonverbal due to dementia       LABS  Lab  Results (last 24 hours)     ** No results found for the last 24 hours. **          IMAGING  Imaging Results (last 72 hours)     Procedure Component Value Units Date/Time    XR Chest AP [[id number]] Collected:  01/14/18 0811     Updated:  01/14/18 0820    Narrative:       EXAMINATION: XR CHEST AP-      CLINICAL INDICATION:     follow up pneumonia; J69.0-Pneumonitis due to  inhalation of food and vomit; A41.9-Sepsis, unspecified organism     TECHNIQUE:  XR CHEST AP-      COMPARISON: 01/11/2018      FINDINGS:   NG tube within stomach. Chronic interstitial changes stable. Right lung  interstitial opacities improved.       Impression:       Improvement in right lung opacities. NG tube within stomach.     This report was finalized on 1/14/2018 8:18 AM by Dr. Rubén Vickers MD.             Assessment/Plan   -dysphagia  -aspiration pneumonia  -Alzheimer's dementia  -acute hypoxic respiratory failure  -Severe sepsis  -Adult failure to thrive  -severe protein calorie malnutrition  -Hypertension  -Hypothyroidism    The patient will be scheduled for a G-tube placement for 1/17/18.  Thank you for allowing us to participate in the care of your patient.    The patient was seen and evaluated by Dr. Sinclair including history of present illness, physical examination, assessment and treatment plan.  The note above was reviewed by Dr. Sinclair -- agree with all findings and recommendations.      Electronically signed by: ANKUR Aaron     CC:  Dr. Yordan Knox

## 2018-01-16 NOTE — PLAN OF CARE
Problem: Patient Care Overview (Adult)  Goal: Plan of Care Review  Outcome: Ongoing (interventions implemented as appropriate)    Goal: Discharge Needs Assessment  Outcome: Ongoing (interventions implemented as appropriate)      Problem: Skin Integrity Impairment, Risk/Actual (Adult)  Goal: Identify Related Risk Factors and Signs and Symptoms  Outcome: Ongoing (interventions implemented as appropriate)    Goal: Skin Integrity/Wound Healing  Outcome: Ongoing (interventions implemented as appropriate)    Goal: Identify Related Risk Factors and Signs and Symptoms  Outcome: Ongoing (interventions implemented as appropriate)    Goal: Skin Integrity/Wound Healing  Outcome: Ongoing (interventions implemented as appropriate)      Problem: Sepsis (Adult)  Goal: Signs and Symptoms of Listed Potential Problems Will be Absent or Manageable (Sepsis)  Outcome: Ongoing (interventions implemented as appropriate)      Problem: Fall Risk (Adult)  Goal: Identify Related Risk Factors and Signs and Symptoms  Outcome: Ongoing (interventions implemented as appropriate)    Goal: Absence of Falls  Outcome: Ongoing (interventions implemented as appropriate)      Problem: Dysphagia (Adult)  Goal: Identify Related Risk Factors and Signs and Symptoms  Outcome: Ongoing (interventions implemented as appropriate)    Goal: Functional/Safe Swallow  Outcome: Ongoing (interventions implemented as appropriate)    Goal: Compensatory Techniques to Improve Safety/Function with Swallowing  Outcome: Ongoing (interventions implemented as appropriate)      Problem: Anxiety (Adult)  Goal: Identify Related Risk Factors and Signs and Symptoms  Outcome: Ongoing (interventions implemented as appropriate)    Goal: Reduction/Resolution  Outcome: Ongoing (interventions implemented as appropriate)      Problem: Infection, Risk/Actual (Adult)  Goal: Identify Related Risk Factors and Signs and Symptoms  Outcome: Ongoing (interventions implemented as  appropriate)    Goal: Infection Prevention/Resolution  Outcome: Ongoing (interventions implemented as appropriate)      Problem: Pressure Ulcer (Adult)  Goal: Signs and Symptoms of Listed Potential Problems Will be Absent or Manageable (Pressure Ulcer)  Outcome: Ongoing (interventions implemented as appropriate)      Problem: Mobility, Physical Impaired (Adult)  Goal: Identify Related Risk Factors and Signs and Symptoms  Outcome: Ongoing (interventions implemented as appropriate)    Goal: Enhanced Mobility Skills  Outcome: Ongoing (interventions implemented as appropriate)    Goal: Enhanced Functionality Ability  Outcome: Ongoing (interventions implemented as appropriate)      Problem: Respiratory Insufficiency (Adult)  Goal: Identify Related Risk Factors and Signs and Symptoms  Outcome: Ongoing (interventions implemented as appropriate)    Goal: Acid/Base Balance  Outcome: Ongoing (interventions implemented as appropriate)    Goal: Effective Ventilation  Outcome: Ongoing (interventions implemented as appropriate)      Problem: Pressure Ulcer Risk (Mike Scale) (Adult,Obstetrics,Pediatric)  Goal: Identify Related Risk Factors and Signs and Symptoms  Outcome: Ongoing (interventions implemented as appropriate)    Goal: Skin Integrity  Outcome: Ongoing (interventions implemented as appropriate)

## 2018-01-16 NOTE — PROGRESS NOTES
"Palliative Care Daily Progress Note     S: Medical record reviewed, followed up with pt, sitter at bedside, SW regarding patient's condition. Events noted. Pt remains minimally responsive. Tolerating NGT and TFs. No family at bedside.       O:   Palliative Performance Scale Score: 20%   /77 (BP Location: Right arm, Patient Position: Sitting)  Pulse 75  Temp 98.4 °F (36.9 °C) (Axillary)   Resp 20  Ht 180.3 cm (71\")  Wt 45.9 kg (101 lb 1.6 oz)  SpO2 99%  BMI 14.1 kg/m2    Intake/Output Summary (Last 24 hours) at 01/16/18 1745  Last data filed at 01/16/18 1400   Gross per 24 hour   Intake             1838 ml   Output                0 ml   Net             1838 ml       PE:  General Appearance:    Chronically ill appearing, minimally responsive, NAD   HEENT:    NC/AT, without obvious abnormality, EOMI, anicteric, poor dentition, NGT in place    Neck:   Hyperextended and stiff, trachea midline, no JVD   Lungs:     CTAB without w/r/r    Heart:    RRR, normal S1 and S2, no M/R/G   Abdomen:     Soft, NT, ND, NABS    Extremities:   Moves all extremities spontaneously, no edema   Pulses:   Pulses palpable and equal bilaterally   Skin:   Warm, dry   Neurologic:   Alert at times but nonverbal, cooperative   Psych:   Calm, appropriate           Meds: Reviewed and changes noted    Labs:     Results from last 7 days  Lab Units 01/15/18  0156   WBC 10*3/mm3 7.47   HEMOGLOBIN g/dL 12.9   HEMATOCRIT % 40.4   PLATELETS 10*3/mm3 296       Results from last 7 days  Lab Units 01/15/18  0156   SODIUM mmol/L 139   POTASSIUM mmol/L 4.2   CHLORIDE mmol/L 105   CO2 mmol/L 27.2   BUN mg/dL 10   CREATININE mg/dL 0.53   GLUCOSE mg/dL 112*   CALCIUM mg/dL 8.7       Results from last 7 days  Lab Units 01/15/18  0156  01/13/18  0153   SODIUM mmol/L 139  < > 139   POTASSIUM mmol/L 4.2  < > 4.0   CHLORIDE mmol/L 105  < > 109   CO2 mmol/L 27.2  < > 25.7   BUN mg/dL 10  < > 9   CREATININE mg/dL 0.53  < > 0.54   CALCIUM mg/dL 8.7  < > 8.9 "   BILIRUBIN mg/dL  --   --  0.5   ALK PHOS U/L  --   --  66   ALT (SGPT) U/L  --   --  21   AST (SGOT) U/L  --   --  19   GLUCOSE mg/dL 112*  < > 109   < > = values in this interval not displayed.  Imaging Results (last 72 hours)     Procedure Component Value Units Date/Time    XR Chest 1 View [[id number]] Collected:  01/07/18 2355     Updated:  01/07/18 2357    Narrative:       EXAMINATION: XR CHEST 1 VW-      CLINICAL INDICATION:     congestion, soa     TECHNIQUE:  XR CHEST 1 VW-      COMPARISON: NONE      FINDINGS:   Patchy right infrahilar opacities.   Heart and mediastinal contours are unremarkable.   No pneumothorax.   No pleural effusion.   No acute osseous findings.            Impression:       Right infrahilar airspace disease.     This report was finalized on 1/7/2018 11:55 PM by Dr. Rubén Vickers MD.       XR Chest 1 View [[id number]] Collected:  01/09/18 0642     Updated:  01/09/18 0646    Narrative:       EXAMINATION: XR CHEST 1 VW-      CLINICAL INDICATION:     to verify NG tube placement; J69.0-Pneumonitis  due to inhalation of food and vomit; A41.9-Sepsis, unspecified organism     TECHNIQUE:  XR CHEST 1 VW-      COMPARISON: 1/7/2018      FINDINGS:   NG tube extends below diaphragm presumably into stomach.   Left basilar atelectasis.   Potential or congestion.   Right infrahilar opacities improved.   No effusion or pneumothorax.            Impression:       1. Interval placement of NG tube.  2. Improvement in right basilar airspace disease with increasing left  basilar atelectasis. Pulmonary vascular congestion noted.     This report was finalized on 1/9/2018 6:43 AM by Dr. Rubén Vickers MD.       XR Chest 1 View [[id number]] Collected:  01/09/18 0643     Updated:  01/09/18 0646    Narrative:       EXAMINATION: XR CHEST 1 VW-      CLINICAL INDICATION:     NG Tube Placement; J69.0-Pneumonitis due to  inhalation of food and vomit; A41.9-Sepsis, unspecified organism     TECHNIQUE:  XR CHEST 1 VW-       COMPARISON: 1/8/2018      FINDINGS:   Stable NG tube positioning. Basilar airspace disease improved since  previous. Chest otherwise stable. No pneumothorax.       Impression:       Stable NG tube positioning. Improvement in basilar  opacities.     This report was finalized on 1/9/2018 6:44 AM by Dr. Rubén Vickers MD.               Diagnostics: Reviewed    A: Baldo Eden is a 62 y.o. yo female with RLL aspiration PNA, early onset Alzheimer's dementia      P:  Pt appears comfortable at present. Sitter at bedside reports no concerns.     No family at bedside to discuss GOC. Will attempt to contact  to discuss possible PEG placement.     SW working on alternative NH placement at 's request.     Regardless of PEG placement, pt may need to transition back to the NH with hospice services. She can have hospice services with artifical nutrition in place. This was not discussed with the  today.     We will continue to follow along. Please do not hesitate to contact us regarding further sx mgmt or GOC needs, including after hours or on weekends via our on call provider at [phone].     Debora Izquierdo MD    1/16/2018

## 2018-01-16 NOTE — PROGRESS NOTES
Acute Care - Speech Language Pathology   Swallow Follow Up [facility]     Patient Name: Baldo Eden  : 1955  MRN: [id number]  Today's Date: 2018     Admit Date: 2018    Mrs. Eden is seen at bedside this am on 3N. RN, Bre, is present at bedside and denies any significant change/improvement in status. Mrs. Eden continues generally nonresponsive to stimuli, non-interactive, unable to follow commands.  She is resting comfortably on SLP entry, primary oral respiration pattern. She continues w/ NG tube for alternative nutrition/hydration/medication. She continues unsafe for po intake at this time. Noted in MD progress note plan to proceed w/ long term alternative nutritional support pending discussion w/ family. Per prolonged status of pt unable to functionally participate in formal dysphagia evaluation across this hospitalization, no further SLP f/u warranted at this time.     D/w RN pt status w/ verbal agreement.     Thank you-  Yuki Holman M.A., CCC-SLP     Visit Dx:     ICD-10-CM ICD-9-CM   1. Aspiration pneumonia of right lower lobe, unspecified aspiration pneumonia type J69.0 507.0   2. Sepsis, due to unspecified organism A41.9 038.9     995.91     Patient Active Problem List   Diagnosis   • Aspiration pneumonia of right lower lobe     Past Medical History:   Diagnosis Date   • Alzheimer disease    • Anxiety    • Dementia    • Dermatopolymyositis    • Dysphagia    • Hypertension    • Hypothyroid    • Malnutrition    • Muscle spasm    • Quadriparesis    • Vitamin D deficiency      History reviewed. No pertinent surgical history.    EDUCATION  The patient has been educated in the following areas:   Dysphagia (Swallowing Impairment) Oral Care/Hydration NPO rationale.    SLP Recommendation and Plan  SLP to f/u for pt status.      Time Calculation:         Time Calculation- SLP       18 1057          Time Calculation- SLP    SLP - Next Appointment 18  -CM        User Key  (r) =  Recorded By, (t) = Taken By, (c) = Cosigned By    Initials Name Provider Type     Yuki Holman MA,CCC-SLP Speech Therapist          SLP G-Codes  Functional Limitations: Swallowing  Swallow Current Status (): At least 80 percent but less than 100 percent impaired, limited or restricted  Swallow Goal Status (): At least 40 percent but less than 60 percent impaired, limited or restricted    Yuki Holman MA,CCC-SLP  1/16/2018

## 2018-01-17 ENCOUNTER — ANESTHESIA EVENT (OUTPATIENT)
Dept: PERIOP | Facility: HOSPITAL | Age: 63
End: 2018-01-17

## 2018-01-17 ENCOUNTER — ANESTHESIA (OUTPATIENT)
Dept: PERIOP | Facility: HOSPITAL | Age: 63
End: 2018-01-17

## 2018-01-17 VITALS
OXYGEN SATURATION: 98 % | RESPIRATION RATE: 20 BRPM | HEART RATE: 88 BPM | WEIGHT: 101.1 LBS | BODY MASS INDEX: 14.15 KG/M2 | DIASTOLIC BLOOD PRESSURE: 81 MMHG | SYSTOLIC BLOOD PRESSURE: 127 MMHG | HEIGHT: 71 IN | TEMPERATURE: 98 F

## 2018-01-17 PROBLEM — R13.10 DYSPHAGIA: Status: ACTIVE | Noted: 2018-01-07

## 2018-01-17 PROCEDURE — 25010000002 PROPOFOL 10 MG/ML EMULSION: Performed by: NURSE ANESTHETIST, CERTIFIED REGISTERED

## 2018-01-17 PROCEDURE — 43246 EGD PLACE GASTROSTOMY TUBE: CPT | Performed by: INTERNAL MEDICINE

## 2018-01-17 PROCEDURE — 94799 UNLISTED PULMONARY SVC/PX: CPT

## 2018-01-17 PROCEDURE — 99239 HOSP IP/OBS DSCHRG MGMT >30: CPT | Performed by: INTERNAL MEDICINE

## 2018-01-17 PROCEDURE — 0DH68UZ INSERTION OF FEEDING DEVICE INTO STOMACH, VIA NATURAL OR ARTIFICIAL OPENING ENDOSCOPIC: ICD-10-PCS | Performed by: INTERNAL MEDICINE

## 2018-01-17 RX ORDER — IPRATROPIUM BROMIDE AND ALBUTEROL SULFATE 2.5; .5 MG/3ML; MG/3ML
3 SOLUTION RESPIRATORY (INHALATION) ONCE AS NEEDED
Status: DISCONTINUED | OUTPATIENT
Start: 2018-01-17 | End: 2018-01-17 | Stop reason: HOSPADM

## 2018-01-17 RX ORDER — SODIUM CHLORIDE, SODIUM LACTATE, POTASSIUM CHLORIDE, CALCIUM CHLORIDE 600; 310; 30; 20 MG/100ML; MG/100ML; MG/100ML; MG/100ML
125 INJECTION, SOLUTION INTRAVENOUS CONTINUOUS
Status: DISCONTINUED | OUTPATIENT
Start: 2018-01-17 | End: 2018-01-17 | Stop reason: HOSPADM

## 2018-01-17 RX ORDER — ONDANSETRON 2 MG/ML
4 INJECTION INTRAMUSCULAR; INTRAVENOUS ONCE AS NEEDED
Status: DISCONTINUED | OUTPATIENT
Start: 2018-01-17 | End: 2018-01-17 | Stop reason: HOSPADM

## 2018-01-17 RX ORDER — ACETAMINOPHEN 160 MG/5ML
650 SOLUTION ORAL EVERY 6 HOURS PRN
Qty: 240 ML | Refills: 0 | Status: ON HOLD | OUTPATIENT
Start: 2018-01-17 | End: 2019-01-01

## 2018-01-17 RX ORDER — SODIUM CHLORIDE 0.9 % (FLUSH) 0.9 %
1-10 SYRINGE (ML) INJECTION AS NEEDED
Status: DISCONTINUED | OUTPATIENT
Start: 2018-01-17 | End: 2018-01-17 | Stop reason: HOSPADM

## 2018-01-17 RX ORDER — BACITRACIN ZINC 500 [USP'U]/G
OINTMENT TOPICAL EVERY 12 HOURS SCHEDULED
Qty: 1 EACH | Refills: 0 | Status: ON HOLD
Start: 2018-01-17 | End: 2019-01-01

## 2018-01-17 RX ORDER — AMOXICILLIN AND CLAVULANATE POTASSIUM 500; 125 MG/1; MG/1
1 TABLET, FILM COATED ORAL EVERY 12 HOURS SCHEDULED
Qty: 10 TABLET | Refills: 0
Start: 2018-01-17 | End: 2018-01-22

## 2018-01-17 RX ORDER — LIDOCAINE HYDROCHLORIDE 20 MG/ML
INJECTION, SOLUTION INFILTRATION; PERINEURAL AS NEEDED
Status: DISCONTINUED | OUTPATIENT
Start: 2018-01-17 | End: 2018-01-17 | Stop reason: SURG

## 2018-01-17 RX ORDER — FENTANYL CITRATE 50 UG/ML
50 INJECTION, SOLUTION INTRAMUSCULAR; INTRAVENOUS
Status: DISCONTINUED | OUTPATIENT
Start: 2018-01-17 | End: 2018-01-17 | Stop reason: HOSPADM

## 2018-01-17 RX ORDER — PROPOFOL 10 MG/ML
VIAL (ML) INTRAVENOUS AS NEEDED
Status: DISCONTINUED | OUTPATIENT
Start: 2018-01-17 | End: 2018-01-17 | Stop reason: SURG

## 2018-01-17 RX ADMIN — AMOXICILLIN AND CLAVULANATE POTASSIUM 500 MG: 500; 125 TABLET, FILM COATED ORAL at 12:38

## 2018-01-17 RX ADMIN — ASPIRIN 81 MG: 81 TABLET, CHEWABLE ORAL at 12:39

## 2018-01-17 RX ADMIN — POTASSIUM & SODIUM PHOSPHATES POWDER PACK 280-160-250 MG 1 PACKET: 280-160-250 PACK at 12:38

## 2018-01-17 RX ADMIN — BACITRACIN ZINC: 500 OINTMENT TOPICAL at 08:33

## 2018-01-17 RX ADMIN — LIDOCAINE HYDROCHLORIDE 100 MG: 20 INJECTION, SOLUTION INFILTRATION; PERINEURAL at 10:40

## 2018-01-17 RX ADMIN — ACETAMINOPHEN 650 MG: 650 SOLUTION ORAL at 16:41

## 2018-01-17 RX ADMIN — PROPOFOL 60 MG: 10 INJECTION, EMULSION INTRAVENOUS at 10:40

## 2018-01-17 RX ADMIN — Medication 1 CAPSULE: at 12:38

## 2018-01-17 RX ADMIN — SODIUM CHLORIDE, POTASSIUM CHLORIDE, SODIUM LACTATE AND CALCIUM CHLORIDE: 600; 310; 30; 20 INJECTION, SOLUTION INTRAVENOUS at 10:36

## 2018-01-17 RX ADMIN — CEFAZOLIN 1 G: 1 INJECTION, POWDER, FOR SOLUTION INTRAMUSCULAR; INTRAVENOUS; PARENTERAL at 10:40

## 2018-01-17 NOTE — PLAN OF CARE
Problem: GI Endoscopy (Adult)  Goal: Signs and Symptoms of Listed Potential Problems Will be Absent or Manageable (GI Endoscopy)  Outcome: Ongoing (interventions implemented as appropriate)   01/17/18 1041   GI Endoscopy   Problems Assessed (GI Endoscopy) all   Problems Present (GI Endoscopy) none

## 2018-01-17 NOTE — DISCHARGE PLACEMENT REQUEST
"Baldo Mae (62 y.o. Female)     Date of Birth Social Security Number Address Home Phone MRN    1955  [address] [facility] [address] [phone] [id number]    Sh[place] Marital Status          Unknown        Admission Date Admission Type Admitting Provider Attending Provider Department, Room/Bed    1/7/18 Emergency Tammy Bueno MD Humble, Michael Brian, MD [address], 3344/1S    Discharge Date Discharge Disposition Discharge Destination         Skilled Nursing Facility (DC - External)             Attending Provider: Yordan Knox MD     Allergies:  Aricept [Donepezil Hcl]    Isolation:  None   Infection:  None   Code Status:  Conditional    Ht:  180.3 cm (71\")   Wt:  45.9 kg (101 lb 1.6 oz)    Admission Cmt:  None   Principal Problem:  None                Active Insurance as of 1/7/2018     Primary Coverage     Payor Plan Insurance Group Employer/Plan Group    [facility] BLUE [employer] [id number] [id number]     Payor Plan Address Payor Plan Phone Number Effective From Effective To    [address] [phone] 1/1/2018     [place] [address]       Subscriber Name Subscriber Birth Date Member ID       BALDO MAE 1955 [id number]           Secondary Coverage     Payor Plan Insurance Group Employer/Plan Group    [place]      Payor Plan Address Payor Plan Phone Number Effective From Effective To    [address] [phone] 10/7/2016     [place] [address]       Subscriber Name Subscriber Birth Date Member ID       BALDO MAE 1955 [id number]                 Emergency Contacts      (Rel.) Home Phone Work Phone Mobile Phone    Amelia Clifton (Daughter) -- -- [phone]    WashburnLibby boland (Daughter) -- -- [phone]               History & Physical      Tammy Bueno MD at 1/8/2018  1:46 AM              [facility] HOSPITALIST HISTORY AND PHYSICAL    Patient " Identification:  Name:  Baldo Eden  Age:  62 y.o.  Sex:  female  :  1955  MRN:  [id number]   Visit Number:  [id number]  Primary Care Physician:  Kashmir Garcia MD     Chief complaint:  Aspirated at the nursing home    History of presenting illness:  62 y.o. female, resident of [facility] since 4/10/2015, who presented to [facility] ED via ambulance for choking on food; she was eating a pureed diet.  The patient is nonverbal due to dementia and I was not able to obtain any history from her; no family is at bedside.  The patient is a DNR at the nursing home; the family was asked by the ED staff and they do not desire any levophed or aggressive treatment other than antibiotics and fluids.  In the ED, CXR showed a right lower lobe infiltrate and she was septic.  Cultures were obtained and she was given zosyn and vancomycin and then placed on the med-surg floor.  ---------------------------------------------------------------------------------------------------------------------   Review of Systems   Unable to perform ROS: Dementia    ---------------------------------------------------------------------------------------------------------------------   Past Medical History:   Diagnosis Date   • Alzheimer disease    • Anxiety    • Dementia    • Dermatopolymyositis    • Dysphagia    • Hypertension    • Hypothyroid    • Malnutrition    • Muscle spasm    • Quadriparesis    • Vitamin D deficiency      History reviewed. No pertinent surgical history.     Family History   Problem Relation Age of Onset   • Family history unknown: Yes     Social History     Social History   • Marital status:      Social History Main Topics   • Smoking status: Never Smoker   • Smokeless tobacco: Never Used   • Alcohol use No   • Drug use: No   • Sexual activity: Defer   ---------------------------------------------------------------------------------------------------------------------   Allergies:   Aricept [donepezil hcl]  ---------------------------------------------------------------------------------------------------------------------   Prior to Admission Medications     Prescriptions Last Dose Informant Patient Reported? Taking?    baclofen (LIORESAL) 10 MG tablet 1/8/2018 Nursing Home Yes Yes    Take 5 mg by mouth 2 (Two) Times a Day.    cholecalciferol (VITAMIN D3) 1000 units tablet 1/8/2018 Nursing Home Yes Yes    Take 1,000 Units by mouth Daily.    diphenhydrAMINE (BENADRYL) 25 mg capsule 1/7/2018 Nursing Home Yes Yes    Take 25 mg by mouth every night at bedtime.    levothyroxine (SYNTHROID, LEVOTHROID) 125 MCG tablet 1/8/2018 Nursing Home Yes Yes    Take 125 mcg by mouth Daily.    multivitamin (DAILY BLACK) tablet tablet 1/8/2018 Nursing Home Yes Yes    Take 1 tablet by mouth Daily.    ramipril (ALTACE) 5 MG capsule 1/8/2018 Nursing Home Yes Yes    Take 5 mg by mouth Daily.    senna (SENOKOT) 8.6 MG tablet tablet 1/8/2018 Nursing Home Yes Yes    Take 1 tablet by mouth Daily.    traMADol (ULTRAM) 50 MG tablet 1/8/2018 Nursing Home Yes Yes    Take 50 mg by mouth 3 (Three) Times a Day.   ---------------------------------------------------------------------------------------------------------------------   Vital Signs:  Temp:  [100 °F (37.8 °C)-101.8 °F (38.8 °C)] 100 °F (37.8 °C)  Heart Rate:  [] 97  Resp:  [26-32] 26  BP: ()/(58-71) 108/60  Last 3 weights    01/07/18 2050   Weight: 45.8 kg (101 lb)     Body mass index is 14.09 kg/(m^2).  ---------------------------------------------------------------------------------------------------------------------   Physical Exam:  Constitutional:  Well-developed and well-nourished.  Severe respiratory distress.  Bilateral temporal muscle wasting.  HENT:  Head: Normocephalic and atraumatic.  Mouth:  Moist mucous membranes.    Eyes:  Conjunctivae and EOM are normal.  Pupils are equal, round, and reactive to light.  No scleral icterus.  Neck:  Neck  supple.  No JVD present.    Cardiovascular:  Normal rate, regular rhythm and normal heart sounds with no murmur.  Pulmonary/Chest:  Severe respiratory distress, no wheezes, crackles throughout and fair air movement.  Abdominal:  Soft.  Bowel sounds are normal.  No distension and no tenderness.   Musculoskeletal:  No edema, no tenderness, and no deformity.  No red or swollen joints anywhere.  Atrophy of muscles in the arms and legs and loss of fat on these extremities.  Neurological:  Obtunded and not able to follow commands; moves arms and legs but not on command.  Nonverbal.    Skin:  Skin is warm and dry.  No rash noted.  No pallor.   Peripheral vascular:  No edema and strong pulses on all 4 extremities.  Genitourinary:   No bazan catheter in place.  ---------------------------------------------------------------------------------------------------------------------  EKG:  NS with a heart rate of 97, QTc 566 ms  Telemetry:  Not ordered  I have personally looked at the EKG.  ---------------------------------------------------------------------------------------------------------------------  Results from last 7 days  Lab Units 01/07/18  2119   LACTATE mmol/L 2.7*   WBC 10*3/mm3 19.68*  21.23*   HEMOGLOBIN g/dL 15.6  15.2   HEMATOCRIT % 49.5*  48.5*   MCV fL 93.9  93.8   MCHC g/dL 31.5*  31.3*   PLATELETS 10*3/mm3 283  296     Results from last 7 days  Lab Units 01/07/18  2119   SODIUM mmol/L 162*   POTASSIUM mmol/L 3.7   CHLORIDE mmol/L 126*   CO2 mmol/L 24.2*   BUN mg/dL 20   CREATININE mg/dL 0.81   EGFR IF NONAFRICN AM mL/min/1.73 72   CALCIUM mg/dL 9.0   GLUCOSE mg/dL 180*   ALBUMIN g/dL 3.80   BILIRUBIN mg/dL 0.6   ALK PHOS U/L 82   AST (SGOT) U/L 35*   ALT (SGPT) U/L 42*   Estimated Creatinine Clearance: 52.1 mL/min (by C-G formula based on Cr of 0.81).    Results from last 7 days  Lab Units 01/07/18  2326 01/07/18 2119   TROPONIN I ng/mL 0.032 0.023         I have personally looked at the labs and they  are stated above.  ---------------------------------------------------------------------------------------------------------------------  Imaging Results (last 7 days)     Procedure Component Value Units Date/Time    XR Chest 1 View [[id number]] Collected:  01/07/18 2355     Updated:  01/07/18 0100    Narrative:       CLINICAL INDICATION:     congestion, soa  COMPARISON: NONE   FINDINGS:   Patchy right infrahilar opacities.   Heart and mediastinal contours are unremarkable.   No pneumothorax.   No pleural effusion.   No acute osseous findings.    Impression:       Right infrahilar airspace disease.  This report was finalized on 1/7/2018 11:55 PM by Dr. Rubén Vickers MD.      I have personally reviewed the radiology images and read the final radiology report.  ---------------------------------------------------------------------------------------------------------------------  Assessment and Plan:  -Sepsis and acute hypoxic respiratory failure due to right lower lobe pneumonia suspected to be due to aspiration  -Hypernatremia due to dehydration  -Liver enzyme elevation  -Possible UTI  -Functional quadriplegia  -History of dysphagia  -Essential hypertension  -Anxiety    Will start on zosyn and vancomycin and give IVF for the dehydration.  Will repeat in the morning to see if she is improving.  DNR is noted.  Will consult speech therapy; if it is determined that she needs a Gtube then we will talk to the family about this.  Will consult palliative care.    Tammy Bueno MD  01/08/18  1:46 AM       Electronically signed by Tammy Bueno MD at 1/8/2018  2:43 AM        Vital Signs (last 24 hours)       01/16 0700  -  01/17 0659 01/17 0700  -  01/17 1312   Most Recent    Temp (°F) 97.9 -  99.1    97.1 -  98.1     97.1 (36.2)    Heart Rate 50 -  77    58 -  74     74    Resp 18 -  20    18 -  20     20    BP 90/60 -  130/63    98/55 -  143/63     113/57    SpO2 (%) 97 -  99    99 -  100     100          Intake &  Output (last day)       01/16 0701 - 01/17 0700 01/17 0701 - 01/18 0700    I.V. (mL/kg)  100 (2.2)    Other 310 100    NG/     Total Intake(mL/kg) 632 (13.8) 200 (4.4)    Net +632 +200          Unmeasured Urine Occurrence 7 x     Unmeasured Stool Occurrence 1 x         Hospital Medications (active)       Dose Frequency Start End    acetaminophen (TYLENOL) 160 MG/5ML solution 650 mg 650 mg Every 6 Hours PRN 1/8/2018     Sig - Route: 20.3 mL by Nasogastric route Every 6 (Six) Hours As Needed for Mild Pain . - Nasogastric    amoxicillin-clavulanate (AUGMENTIN) 500-125 MG per tablet 500 mg 1 tablet Every 12 Hours Scheduled 1/14/2018 1/21/2018    Sig - Route: 1 tablet by Nasogastric route Every 12 (Twelve) Hours. - Nasogastric    aspirin chewable tablet 81 mg 81 mg Daily 1/8/2018     Sig - Route: Chew 1 tablet Daily. - Oral    bacitracin ointment  Every 12 Hours Scheduled 1/13/2018     Sig - Route: Apply  topically Every 12 (Twelve) Hours. - Topical    ipratropium-albuterol (DUO-NEB) nebulizer solution 3 mL 3 mL Every 6 Hours PRN 1/8/2018     Sig - Route: Take 3 mL by nebulization Every 6 (Six) Hours As Needed for Shortness of Air. - Nebulization    lactated ringers infusion 125 mL/hr Continuous 1/17/2018     Sig - Route: Infuse 125 mL/hr into a venous catheter Continuous. - Intravenous    lactobacillus acidophilus (RISAQUAD) capsule 1 capsule 1 capsule Daily 1/10/2018     Sig - Route: 1 capsule by Per G Tube route Daily. - Per G Tube    levothyroxine (SYNTHROID, LEVOTHROID) tablet 100 mcg 100 mcg Every Early Morning 1/10/2018     Sig - Route: 1 tablet by Nasogastric route Every Morning. - Nasogastric    Magnesium Sulfate 2 gram Bolus, followed by 8 gram infusion (total Mg dose 10 grams)- Mg less than or equal to 1mg/dL 2 g As Needed 1/8/2018     Sig - Route: Infuse 50 mL into a venous catheter As Needed (Mg less than or equal to 1mg/dL). - Intravenous    Cosign for Ordering: Accepted by Tammy Bueno MD on  "1/15/2018  9:58 PM    Linked Group 1:  \"Or\" Linked Group Details        magnesium sulfate 4 gram infusion- Mg 1.6-1.9 mg/dL 4 g As Needed 1/8/2018     Sig - Route: Infuse 100 mL into a venous catheter As Needed (Mg 1.6-1.9 mg/dL). - Intravenous    Cosign for Ordering: Accepted by Tammy Bueno MD on 1/15/2018  9:58 PM    Linked Group 1:  \"Or\" Linked Group Details        Magnesium Sulfate 6 gram Infusion (2 gm x 3) -Mg 1.1 -1.5 mg/dL 2 g As Needed 1/8/2018     Sig - Route: Infuse 50 mL into a venous catheter As Needed (Mg 1.1 -1.5 mg/dL). - Intravenous    Cosign for Ordering: Accepted by Tammy Bueno MD on 1/15/2018  9:58 PM    Linked Group 1:  \"Or\" Linked Group Details        potassium & sodium phosphates (PHOS-NAK) oral packet 1 packet 2 Times Daily Before Meals 1/9/2018     Sig - Route: Take 1 packet by mouth 2 (Two) Times a Day Before Meals. - Oral    potassium chloride (KLOR-CON) packet 40 mEq 40 mEq As Needed 1/8/2018     Sig - Route: Take 40 mEq by mouth As Needed (potassium replacement, see admin instructions). - Oral    Cosign for Ordering: Accepted by Tammy Bueno MD on 1/15/2018  9:58 PM    Linked Group 2:  \"Or\" Linked Group Details        potassium chloride (MICRO-K) CR capsule 40 mEq 40 mEq As Needed 1/8/2018     Sig - Route: Take 4 capsules by mouth As Needed (potassium replacement.  see admin instructions). - Oral    Cosign for Ordering: Accepted by Tammy Bueno MD on 1/15/2018  9:58 PM    Linked Group 2:  \"Or\" Linked Group Details        potassium chloride 10 mEq in 100 mL IVPB 10 mEq Every 1 Hour PRN 1/8/2018     Sig - Route: Infuse 100 mL into a venous catheter Every 1 (One) Hour As Needed (potassium protocol PERIPHERAL - see admin instructions). - Intravenous    Cosign for Ordering: Accepted by Tammy Bueno MD on 1/15/2018  9:58 PM    Linked Group 2:  \"Or\" Linked Group Details        potassium phosphate 15 mmol in sodium chloride 0.9 % 100 mL infusion 15 mmol As Needed " "1/8/2018     Sig - Route: Infuse 15 mmol into a venous catheter As Needed (Peripheral IV - Phosphorus 1.8 - 2.5). - Intravenous    Cosign for Ordering: Accepted by Tammy Bueno MD on 1/15/2018  9:58 PM    Linked Group 3:  \"Or\" Linked Group Details        potassium phosphate 30 mmol in sodium chloride 0.9 % 250 mL infusion 30 mmol As Needed 1/8/2018     Sig - Route: Infuse 30 mmol into a venous catheter As Needed (Peripheral IV - Phosphorus 1.3 - 1.7). - Intravenous    Cosign for Ordering: Accepted by Tammy Bueno MD on 1/15/2018  9:58 PM    Linked Group 3:  \"Or\" Linked Group Details        potassium phosphate 45 mmol in sodium chloride 0.9 % 500 mL infusion 45 mmol As Needed 1/8/2018     Sig - Route: Infuse 45 mmol into a venous catheter As Needed (Peripheral IV - Phosphorus Less Than 1.3). - Intravenous    Cosign for Ordering: Accepted by Tammy Bueno MD on 1/15/2018  9:58 PM    Linked Group 3:  \"Or\" Linked Group Details        sodium chloride 0.9 % flush 1-10 mL 1-10 mL As Needed 1/8/2018     Sig - Route: Infuse 1-10 mL into a venous catheter As Needed for Line Care. - Intravenous    sodium chloride 0.9 % flush 10 mL 10 mL As Needed 1/7/2018     Sig - Route: Infuse 10 mL into a venous catheter As Needed for Line Care. - Intravenous    Cosign for Ordering: Accepted by Ken Dong MD on 1/8/2018  8:31 AM    Linked Group 4:  \"And\" Linked Group Details        sodium phosphates 15 mmol in sodium chloride 0.9 % 250 mL IVPB 15 mmol As Needed 1/8/2018     Sig - Route: Infuse 15 mmol into a venous catheter As Needed (Peripheral IV - Phosphorus 1.8 - 2.5 & Potassium Greater Than 4). - Intravenous    Cosign for Ordering: Accepted by Tammy Bueno MD on 1/15/2018  9:58 PM    Linked Group 3:  \"Or\" Linked Group Details        sodium phosphates 30 mmol in sodium chloride 0.9 % 250 mL IVPB 30 mmol As Needed 1/8/2018     Sig - Route: Infuse 30 mmol into a venous catheter As Needed (Peripheral IV - " "Phosphorus 1.3-1.7 & Potassium Greater Than 4). - Intravenous    Cosign for Ordering: Accepted by Tammy Bueno MD on 1/15/2018  9:58 PM    Linked Group 3:  \"Or\" Linked Group Details        sodium phosphates 45 mmol in sodium chloride 0.9 % 500 mL IVPB 45 mmol As Needed 1/8/2018     Sig - Route: Infuse 45 mmol into a venous catheter As Needed (Peripheral IV - Phosphorus Less Than 1.3 & Potassium Greater Than 4). - Intravenous    Cosign for Ordering: Accepted by Tammy Bueno MD on 1/15/2018  9:58 PM    Linked Group 3:  \"Or\" Linked Group Details        ceFAZolin (ANCEF) 1 g/100 mL 0.9% NS IVPB (mbp) (Discontinued)  As Needed 1/17/2018 1/17/2018    Sig: As Needed.    Reason for Discontinue: Anesthesia Stop    fentaNYL citrate (PF) (SUBLIMAZE) injection 50 mcg (Discontinued) 50 mcg Every 5 Minutes PRN 1/17/2018 1/17/2018    Sig - Route: Infuse 1 mL into a venous catheter Every 5 (Five) Minutes As Needed for Moderate Pain  or Severe Pain . - Intravenous    Reason for Discontinue: Patient Transfer    ipratropium-albuterol (DUO-NEB) nebulizer solution 3 mL (Discontinued) 3 mL Once As Needed 1/17/2018 1/17/2018    Sig - Route: Take 3 mL by nebulization 1 (One) Time As Needed for Wheezing or Shortness of Air (bronchospasm). - Nebulization    Reason for Discontinue: Patient Transfer    lidocaine (XYLOCAINE) 2% injection (Discontinued)  As Needed 1/17/2018 1/17/2018    Sig: As Needed.    Reason for Discontinue: Anesthesia Stop    ondansetron (ZOFRAN) injection 4 mg (Discontinued) 4 mg Once As Needed 1/17/2018 1/17/2018    Sig - Route: Infuse 2 mL into a venous catheter 1 (One) Time As Needed for Nausea or Vomiting (may repeat times one dose). - Intravenous    Reason for Discontinue: Patient Transfer    Propofol (DIPRIVAN) injection (Discontinued)  As Needed 1/17/2018 1/17/2018    Sig - Route: Infuse  into a venous catheter As Needed. - Intravenous    Reason for Discontinue: Anesthesia Stop    sodium chloride 0.9 % " flush 1-10 mL (Discontinued) 1-10 mL As Needed 1/17/2018 1/17/2018    Sig - Route: Infuse 1-10 mL into a venous catheter As Needed for Line Care. - Intravenous    Reason for Discontinue: Patient Transfer            Lab Results (last 24 hours)     ** No results found for the last 24 hours. **        Imaging Results (last 24 hours)     ** No results found for the last 24 hours. **        Orders (last 24 hrs)     Start     Ordered    01/17/18 1240  Discharge patient  Once      01/17/18 1240    01/17/18 1240  Discontinue IV  Once      01/17/18 1240    01/17/18 1233  -Okay to use G-tube -Okay to begin tube feeding -Routine G-tube care  Misc Nursing Order (Specify)  Once     Comments:  -Okay to use G-tube  -Okay to begin tube feeding  -Routine G-tube care    01/17/18 1232    01/17/18 1100  Vital signs every 5 minutes for 15 minutes, every 15 minutes thereafter.  Once,   Status:  Canceled      01/17/18 1059    01/17/18 1100  Call Anesthesiologist for additional IV Fluid bolus for Hypotension/Tachycardia  Until Discontinued,   Status:  Canceled      01/17/18 1059    01/17/18 1100  Notify Anesthesia of Any Acute Changes in Patient Condition  Until Discontinued,   Status:  Canceled      01/17/18 1059    01/17/18 1100  Notify Anesthesia for Unrelieved Pain  Until Discontinued,   Status:  Canceled      01/17/18 1059    01/17/18 1100  Oxygen Therapy- Blow by - Humidified; Titrate for SPO2: equal to or greater than, 96%, per policy  Continuous      01/17/18 1059    01/17/18 1100  Pulse Oximetry, Continuous  Continuous      01/17/18 1059    01/17/18 1100  Once DC criteria to floor met, follow surgeon's orders.  Until Discontinued,   Status:  Canceled      01/17/18 1059    01/17/18 1100  Discharge patient from PACU when discharge criteria is met.  Until Discontinued,   Status:  Canceled      01/17/18 1059    01/17/18 1059  fentaNYL citrate (PF) (SUBLIMAZE) injection 50 mcg  Every 5 Minutes PRN,   Status:  Discontinued      01/17/18  1059    01/17/18 1059  ipratropium-albuterol (DUO-NEB) nebulizer solution 3 mL  Once As Needed,   Status:  Discontinued      01/17/18 1059    01/17/18 1059  ondansetron (ZOFRAN) injection 4 mg  Once As Needed,   Status:  Discontinued      01/17/18 1059    01/17/18 1030  lactated ringers infusion  Continuous      01/17/18 0954    01/17/18 0955  Oxygen Therapy- Nasal Cannula; Titrate for SPO2: equal to or greater than, 90%  Continuous,   Status:  Canceled      01/17/18 0954    01/17/18 0955  POC Glucose Once  Once,   Status:  Canceled     Comments:  For all diabetic patients and all patients who are to be admitted. Notify Anesthesiologist for blood sugar > 180.    01/17/18 0954    01/17/18 0955  Insert Peripheral IV  Once,   Status:  Canceled      01/17/18 0954    01/17/18 0955  Saline Lock & Maintain IV Access  Continuous,   Status:  Canceled      01/17/18 0954    01/17/18 0955  May take Beta Blocker from home with sip of water.  Once,   Status:  Canceled      01/17/18 0954    01/17/18 0954  Vital Signs - Per Anesthesia Protocol  As Needed,   Status:  Canceled      01/17/18 0954    01/17/18 0954  Pulse Oximetry, Continuous  As Needed,   Status:  Canceled      01/17/18 0954    01/17/18 0954  sodium chloride 0.9 % flush 1-10 mL  As Needed,   Status:  Discontinued      01/17/18 0954    01/17/18 0001  NPO Diet  Diet Effective Midnight,   Status:  Canceled      01/16/18 1346    01/17/18 0001  NPO Diet  Diet Effective Midnight      01/16/18 1528    01/17/18 0000  acetaminophen (TYLENOL) 160 MG/5ML solution  Every 6 Hours PRN      01/17/18 1240    01/17/18 0000  bacitracin 500 UNIT/GM ointment  Every 12 Hours Scheduled      01/17/18 1240    01/17/18 0000  amoxicillin-clavulanate (AUGMENTIN) 500-125 MG per tablet  Every 12 Hours Scheduled      01/17/18 1240    01/17/18 0000  Discharge Follow-up with Specified Provider: Facility provider; 1 Week      01/17/18 1240    01/16/18 1528  Case Request  Once      01/16/18 1528     01/16/18 1528  Obtain Informed Consent  Once      01/16/18 1528    01/16/18 1528  Verify Informed Consent  Once      01/16/18 1528    01/16/18 1346  Inpatient Consult to Gastroenterology  Once     Specialty:  Gastroenterology  Provider:  Wei Sinclair III, MD    01/16/18 1346    01/14/18 2100  amoxicillin-clavulanate (AUGMENTIN) 500-125 MG per tablet 500 mg  Every 12 Hours Scheduled      01/14/18 1532    01/13/18 1400  bacitracin ointment  Every 12 Hours Scheduled      01/13/18 1236    01/10/18 1200  lactobacillus acidophilus (RISAQUAD) capsule 1 capsule  Daily      01/10/18 0939    01/10/18 0600  levothyroxine (SYNTHROID, LEVOTHROID) tablet 100 mcg  Every Early Morning      01/09/18 1605    01/09/18 1100  potassium & sodium phosphates (PHOS-NAK) oral packet  2 Times Daily Before Meals      01/09/18 0922    01/08/18 1837  acetaminophen (TYLENOL) 160 MG/5ML solution 650 mg  Every 6 Hours PRN      01/08/18 1838    01/08/18 1600  aspirin chewable tablet 81 mg  Daily      01/08/18 1506    01/08/18 0739  potassium phosphate 45 mmol in sodium chloride 0.9 % 500 mL infusion  As Needed      01/08/18 0740    01/08/18 0739  potassium phosphate 30 mmol in sodium chloride 0.9 % 250 mL infusion  As Needed      01/08/18 0740    01/08/18 0739  potassium phosphate 15 mmol in sodium chloride 0.9 % 100 mL infusion  As Needed      01/08/18 0740    01/08/18 0739  sodium phosphates 45 mmol in sodium chloride 0.9 % 500 mL IVPB  As Needed      01/08/18 0740    01/08/18 0739  sodium phosphates 30 mmol in sodium chloride 0.9 % 250 mL IVPB  As Needed      01/08/18 0740    01/08/18 0739  sodium phosphates 15 mmol in sodium chloride 0.9 % 250 mL IVPB  As Needed      01/08/18 0740    01/08/18 0738  Magnesium Sulfate 2 gram Bolus, followed by 8 gram infusion (total Mg dose 10 grams)- Mg less than or equal to 1mg/dL  As Needed      01/08/18 0740    01/08/18 0738  Magnesium Sulfate 6 gram Infusion (2 gm x 3) -Mg 1.1 -1.5 mg/dL  As Needed       01/08/18 0740    01/08/18 0738  magnesium sulfate 4 gram infusion- Mg 1.6-1.9 mg/dL  As Needed      01/08/18 0740    01/08/18 0738  potassium chloride (MICRO-K) CR capsule 40 mEq  As Needed      01/08/18 0740    01/08/18 0738  potassium chloride (KLOR-CON) packet 40 mEq  As Needed      01/08/18 0740    01/08/18 0738  potassium chloride 10 mEq in 100 mL IVPB  Every 1 Hour PRN      01/08/18 0740    01/08/18 0209  ipratropium-albuterol (DUO-NEB) nebulizer solution 3 mL  Every 6 Hours PRN      01/08/18 0210    01/08/18 0209  sodium chloride 0.9 % flush 1-10 mL  As Needed      01/08/18 0209 01/07/18 2050  sodium chloride 0.9 % flush 10 mL  As Needed      01/07/18 2052    Unscheduled  Straight cath  As Needed      01/07/18 2210    Unscheduled  Magnesium  As Needed      01/08/18 0740    Unscheduled  Potassium  As Needed      01/08/18 0740    --  diphenhydrAMINE (BENADRYL) 25 mg capsule  Every Night at Bedtime      01/07/18 2121    --  levothyroxine (SYNTHROID, LEVOTHROID) 125 MCG tablet  Daily      01/07/18 2121    --  ramipril (ALTACE) 5 MG capsule  Daily      01/07/18 2121    --  senna (SENOKOT) 8.6 MG tablet tablet  Daily      01/07/18 2121    --  cholecalciferol (VITAMIN D3) 1000 units tablet  Daily      01/07/18 2121    --  baclofen (LIORESAL) 10 MG tablet  2 Times Daily (BID)      01/07/18 2121    --  traMADol (ULTRAM) 50 MG tablet  3 Times Daily      01/07/18 2121    --  multivitamin (DAILY BLACK) tablet tablet  Daily      01/08/18 0115    --  SCANNED - TELEMETRY        01/07/18 0000             Operative/Procedure Notes (most recent note)      Wei Sinclair III, MD at 1/17/2018 10:48 AM  Version 1 of 1         01/17/18    ESOPHAGOGASTRODUODENOSCOPY WITH PERCUTANEOUS ENDOSCOPIC GASTROSTOMY TUBE INSERTION  Procedure Note    Baldo Eden  1/7/2018 - 1/17/2018    Pre-op Diagnosis: Dysphagia, aspiration, failure to thrive, dementia      Post-op Diagnosis: Successful PEG placement        Anesthesia: Per  Anesthesia service, general anesthesia      Estimated Blood Loss: Negligible      Findings: EGD was performed.  No obvious abnormality was seen in the esophagus, stomach or proximal duodenum.  PEG placement was performed in standard fashion using sterile technique.  The skin of the abdominal wall was cleaned with antiseptic solution.  An introducer needle was then inserted through the abdominal wall into the gastric lumen.  A guidewire was passed through the introducer needle into the stomach.  The guidewire was grasped within the stomach using an endoscopic snare and withdrawn through the mouth using the endoscope.  A 20 Telugu gastrostomy feeding tube was attached to the guidewire and pulled back through the mouth into the stomach and out through the abdominal wall.  The G-tube was secured by placing a retention strap around the tube at the abdominal wall.  Caution was used to avoid excessive tension on the G-tube.  The patient was re-endoscoped in order to confirm proper positioning of the feeding tube in the stomach.  After doing this, the scope was withdrawn and the procedure was concluded without complications.  The patient received prophylactic antibiotic coverage at the time of the procedure with CEFAZOLIN 1 gm IV.  She left the OR in stable and satisfactory condition.      Complications: None      Recommendations:   Okay to use G-tube  Okay to begin tube feeding  Routine G-tube care      Wei Sinclair III, MD     Date: 1/17/2018  Time: 10:48 AM     CC:  Dr. Yordan Knox       Electronically signed by Wei Sinclair III, MD at 1/17/2018 10:51 AM           Physician Progress Notes (most recent note)      Debora Izquierdo MD at 1/16/2018 12:45 PM  Version 1 of 1         Palliative Care Daily Progress Note     S: Medical record reviewed, followed up with pt, sitter at bedside, SW regarding patient's condition. Events noted. Pt remains minimally responsive. Tolerating NGT and TFs. No  "family at bedside.       O:   Palliative Performance Scale Score: 20%   /77 (BP Location: Right arm, Patient Position: Sitting)  Pulse 75  Temp 98.4 °F (36.9 °C) (Axillary)   Resp 20  Ht 180.3 cm (71\")  Wt 45.9 kg (101 lb 1.6 oz)  SpO2 99%  BMI 14.1 kg/m2    Intake/Output Summary (Last 24 hours) at 01/16/18 1745  Last data filed at 01/16/18 1400   Gross per 24 hour   Intake             1838 ml   Output                0 ml   Net             1838 ml       PE:  General Appearance:    Chronically ill appearing, minimally responsive, NAD   HEENT:    NC/AT, without obvious abnormality, EOMI, anicteric, poor dentition, NGT in place    Neck:   Hyperextended and stiff, trachea midline, no JVD   Lungs:     CTAB without w/r/r    Heart:    RRR, normal S1 and S2, no M/R/G   Abdomen:     Soft, NT, ND, NABS    Extremities:   Moves all extremities spontaneously, no edema   Pulses:   Pulses palpable and equal bilaterally   Skin:   Warm, dry   Neurologic:   Alert at times but nonverbal, cooperative   Psych:   Calm, appropriate           Meds: Reviewed and changes noted    Labs:     Results from last 7 days  Lab Units 01/15/18  0156   WBC 10*3/mm3 7.47   HEMOGLOBIN g/dL 12.9   HEMATOCRIT % 40.4   PLATELETS 10*3/mm3 296       Results from last 7 days  Lab Units 01/15/18  0156   SODIUM mmol/L 139   POTASSIUM mmol/L 4.2   CHLORIDE mmol/L 105   CO2 mmol/L 27.2   BUN mg/dL 10   CREATININE mg/dL 0.53   GLUCOSE mg/dL 112*   CALCIUM mg/dL 8.7       Results from last 7 days  Lab Units 01/15/18  0156  01/13/18  0153   SODIUM mmol/L 139  < > 139   POTASSIUM mmol/L 4.2  < > 4.0   CHLORIDE mmol/L 105  < > 109   CO2 mmol/L 27.2  < > 25.7   BUN mg/dL 10  < > 9   CREATININE mg/dL 0.53  < > 0.54   CALCIUM mg/dL 8.7  < > 8.9   BILIRUBIN mg/dL  --   --  0.5   ALK PHOS U/L  --   --  66   ALT (SGPT) U/L  --   --  21   AST (SGOT) U/L  --   --  19   GLUCOSE mg/dL 112*  < > 109   < > = values in this interval not displayed.  Imaging Results " (last 72 hours)     Procedure Component Value Units Date/Time    XR Chest 1 View [[id number]] Collected:  01/07/18 2355     Updated:  01/07/18 2357    Narrative:       EXAMINATION: XR CHEST 1 VW-      CLINICAL INDICATION:     congestion, soa     TECHNIQUE:  XR CHEST 1 VW-      COMPARISON: NONE      FINDINGS:   Patchy right infrahilar opacities.   Heart and mediastinal contours are unremarkable.   No pneumothorax.   No pleural effusion.   No acute osseous findings.            Impression:       Right infrahilar airspace disease.     This report was finalized on 1/7/2018 11:55 PM by Dr. Rubén Vickers MD.       XR Chest 1 View [[id number]] Collected:  01/09/18 0642     Updated:  01/09/18 0646    Narrative:       EXAMINATION: XR CHEST 1 VW-      CLINICAL INDICATION:     to verify NG tube placement; J69.0-Pneumonitis  due to inhalation of food and vomit; A41.9-Sepsis, unspecified organism     TECHNIQUE:  XR CHEST 1 VW-      COMPARISON: 1/7/2018      FINDINGS:   NG tube extends below diaphragm presumably into stomach.   Left basilar atelectasis.   Potential or congestion.   Right infrahilar opacities improved.   No effusion or pneumothorax.            Impression:       1. Interval placement of NG tube.  2. Improvement in right basilar airspace disease with increasing left  basilar atelectasis. Pulmonary vascular congestion noted.     This report was finalized on 1/9/2018 6:43 AM by Dr. Rubén Vickers MD.       XR Chest 1 View [[id number]] Collected:  01/09/18 0643     Updated:  01/09/18 0646    Narrative:       EXAMINATION: XR CHEST 1 VW-      CLINICAL INDICATION:     NG Tube Placement; J69.0-Pneumonitis due to  inhalation of food and vomit; A41.9-Sepsis, unspecified organism     TECHNIQUE:  XR CHEST 1 VW-      COMPARISON: 1/8/2018      FINDINGS:   Stable NG tube positioning. Basilar airspace disease improved since  previous. Chest otherwise stable. No pneumothorax.       Impression:       Stable NG tube positioning.  Improvement in basilar  opacities.     This report was finalized on 1/9/2018 6:44 AM by Dr. Rubén Vickers MD.               Diagnostics: Reviewed    A: Baldo Eden is a 62 y.o. yo female with RLL aspiration PNA, early onset Alzheimer's dementia      P:  Pt appears comfortable at present. Sitter at bedside reports no concerns.     No family at bedside to discuss GOC. Will attempt to contact  to discuss possible PEG placement.     SW working on alternative NH placement at 's request.     Regardless of PEG placement, pt may need to transition back to the NH with hospice services. She can have hospice services with artifical nutrition in place. This was not discussed with the  today.     We will continue to follow along. Please do not hesitate to contact us regarding further sx mgmt or GOC needs, including after hours or on weekends via our on call provider at [phone].     Debora Izquierdo MD    1/16/2018       Electronically signed by Debora Izquierdo MD at 1/16/2018  5:47 PM           Consult Notes (most recent note)      Wei Sinclair III, MD at 1/16/2018  3:42 PM  Version 2 of 2     Consult Orders:    1. Inpatient Consult to Gastroenterology [[id number]] ordered by Yordan Knox MD at 01/16/18 1346                01/16/18  Chief Complaint   Patient presents with   • Aspiration     Baldo Eden is a 62 y.o. female who presents today at the request of Dr. Knox for G-tube placement for malnutrition    HPI  The patient was seen for a GI evaluation for G-tube placement for malnutrition.  [place] [facility] [place]  Patient has dementia and is nonverbal and unable to provide case history information.  History was obtained from medical chart and staff.  Patient was transferred from the nursing home to the ED on 1/7/18 due to choking with PO intake.  She was seen by speech therapy and was declared unsafe for PO intake.  Patient has developed aspiration  pneumonia due to aspiration.  She has been receiving nutrition/hydration via NG tube.  Medical, surgical, social, and family histories were reviewed and are listed below.        Review of Systems  History unable to be obtained from the patient due to dementia      ACTIVE PROBLEMS:   Specialty Problems     None          PAST MEDICAL HISTORY:  Past Medical History:   Diagnosis Date   • Alzheimer disease    • Anxiety    • Dementia    • Dermatopolymyositis    • Dysphagia    • Hypertension    • Hypothyroid    • Malnutrition    • Muscle spasm    • Quadriparesis    • Vitamin D deficiency        SURGICAL HISTORY:  History reviewed. No pertinent surgical history.    FAMILY HISTORY:  Family History   Problem Relation Age of Onset   • Family history unknown: Yes       SOCIAL HISTORY:  Social History   Substance Use Topics   • Smoking status: Never Smoker   • Smokeless tobacco: Never Used   • Alcohol use No       CURRENT MEDICATION:    Current Facility-Administered Medications:   •  acetaminophen (TYLENOL) 160 MG/5ML solution 650 mg, 650 mg, Nasogastric, Q6H PRN, Wil Felix MD, 650 mg at 01/16/18 1412  •  amoxicillin-clavulanate (AUGMENTIN) 500-125 MG per tablet 500 mg, 1 tablet, Nasogastric, Q12H, Wil Felix MD, 500 mg at 01/16/18 0901  •  aspirin chewable tablet 81 mg, 81 mg, Oral, Daily, Wil Felix MD, 81 mg at 01/16/18 0901  •  bacitracin ointment, , Topical, Q12H, Wil Felix MD  •  ipratropium-albuterol (DUO-NEB) nebulizer solution 3 mL, 3 mL, Nebulization, Q6H PRN, Tammy Bueno MD, 3 mL at 01/16/18 0742  •  lactobacillus acidophilus (RISAQUAD) capsule 1 capsule, 1 capsule, Per G Tube, Daily, Rand Benavidez [facility], 1 capsule at 01/16/18 0901  •  levothyroxine (SYNTHROID, LEVOTHROID) tablet 100 mcg, 100 mcg, Nasogastric, Q AM, Wil Felix MD, 100 mcg at 01/16/18 0614  •  Magnesium Sulfate 2 gram Bolus, followed by 8 gram infusion (total Mg dose 10 grams)- Mg less than or equal to 1mg/dL, 2 g,  Intravenous, PRN **OR** Magnesium Sulfate 6 gram Infusion (2 gm x 3) -Mg 1.1 -1.5 mg/dL, 2 g, Intravenous, PRN **OR** magnesium sulfate 4 gram infusion- Mg 1.6-1.9 mg/dL, 4 g, Intravenous, PRN, Alba Johnson PA-C  •  potassium & sodium phosphates (PHOS-NAK) oral packet, 1 packet, Oral, BID AC, Rafi Kidd MD, 1 packet at 01/16/18 0614  •  potassium chloride (MICRO-K) CR capsule 40 mEq, 40 mEq, Oral, PRN **OR** potassium chloride (KLOR-CON) packet 40 mEq, 40 mEq, Oral, PRN, 40 mEq at 01/12/18 0638 **OR** potassium chloride 10 mEq in 100 mL IVPB, 10 mEq, Intravenous, Q1H PRN, Alba Johnson PA-C  •  potassium phosphate 45 mmol in sodium chloride 0.9 % 500 mL infusion, 45 mmol, Intravenous, PRN **OR** potassium phosphate 30 mmol in sodium chloride 0.9 % 250 mL infusion, 30 mmol, Intravenous, PRN **OR** potassium phosphate 15 mmol in sodium chloride 0.9 % 100 mL infusion, 15 mmol, Intravenous, PRN **OR** sodium phosphates 45 mmol in sodium chloride 0.9 % 500 mL IVPB, 45 mmol, Intravenous, PRN **OR** sodium phosphates 30 mmol in sodium chloride 0.9 % 250 mL IVPB, 30 mmol, Intravenous, PRN **OR** sodium phosphates 15 mmol in sodium chloride 0.9 % 250 mL IVPB, 15 mmol, Intravenous, PRN, Alba Johnson PA-C  •  sodium chloride 0.9 % flush 1-10 mL, 1-10 mL, Intravenous, PRN, Tammy Bueno MD  •  Insert peripheral IV, , , Once **AND** sodium chloride 0.9 % flush 10 mL, 10 mL, Intravenous, PRN, JOANA Velásquez    ALLERGIES:  Aricept [donepezil hcl]     VITAL SIGNS:  Vital Signs (last 24 hours)       01/15 0700  -  01/16 0659 01/16 0700  -  01/16 1542   Most Recent    Temp (°F) 97.7 -  98.2    97.9 -  98.4     98.4 (36.9)    Heart Rate 54 -  82    51 -  77     75    Resp   18    18 -  20     20    BP 95/68 -  109/58    90/60 -  113/77     113/77    SpO2 (%) 96 -  99      99     99          PHYSICAL EXAMINATION    General Appearance:    Alert, nonverbal, in no acute distress;  thin-appearing   Head:    Normocephalic, without obvious abnormality, atraumatic   Eyes:            Lids and lashes normal, conjunctivae and sclerae normal, no   icterus, no pallor, corneas clear, PERRLA   Ears:    Ears appear intact with no abnormalities noted   Throat:   No oral lesions, no thrush, oral mucosa moist   Neck:   No adenopathy, supple, trachea midline, no thyromegaly, no   carotid bruit, no JVD   Back:     No kyphosis present, no scoliosis present, no skin lesions,      erythema or scars, no tenderness to percussion or                   palpation,   range of motion normal   Lungs:     Clear to auscultation,respirations regular, even and                  unlabored    Heart:    Regular rhythm and normal rate, normal S1 and S2, no            murmur, no gallop, no rub, no click   Chest Wall:    No abnormalities observed   Abdomen:     Normal bowel sounds, no masses, no organomegaly, soft        non-tender, non-distended, no guarding, no rebound                tenderness   Rectal:     Deferred   Extremities:   Decreased strength;  no edema, no cyanosis, no             redness   Pulses:   Pulses palpable and equal bilaterally   Skin:   No bleeding, bruising or rash   Lymph nodes:   No palpable adenopathy   Neurologic:   Confused and nonverbal due to dementia       LABS  Lab Results (last 24 hours)     ** No results found for the last 24 hours. **          IMAGING  Imaging Results (last 72 hours)     Procedure Component Value Units Date/Time    XR Chest AP [[id number]] Collected:  01/14/18 0811     Updated:  01/14/18 0820    Narrative:       EXAMINATION: XR CHEST AP-      CLINICAL INDICATION:     follow up pneumonia; J69.0-Pneumonitis due to  inhalation of food and vomit; A41.9-Sepsis, unspecified organism     TECHNIQUE:  XR CHEST AP-      COMPARISON: 01/11/2018      FINDINGS:   NG tube within stomach. Chronic interstitial changes stable. Right lung  interstitial opacities improved.       Impression:        Improvement in right lung opacities. NG tube within stomach.     This report was finalized on 1/14/2018 8:18 AM by Dr. Rubén Vickers MD.             Assessment/Plan   -dysphagia  -aspiration pneumonia  -Alzheimer's dementia  -acute hypoxic respiratory failure  -Severe sepsis  -Adult failure to thrive  -severe protein calorie malnutrition  -Hypertension  -Hypothyroidism    The patient will be scheduled for a G-tube placement for 1/17/18.  Thank you for allowing us to participate in the care of your patient.    The patient was seen and evaluated by Dr. Sinclair including history of present illness, physical examination, assessment and treatment plan.  The note above was reviewed by Dr. Sinclair -- agree with all findings and recommendations.      Electronically signed by: ANKUR Aaron     CC:  Dr. Yordan Knox     Electronically signed by Wei Sinclair III, MD at 1/17/2018  7:47 AM      ANKUR Aaron at 1/16/2018  3:42 PM  Version 1 of 2         01/16/18  Chief Complaint   Patient presents with   • Aspiration     Baldo Eden is a 62 y.o. female who presents today at the request of Dr. Knox for G-tube placement for malnutrition    HPI  The patient was seen for a GI evaluation for G-tube placement for malnutrition.  She [place] a [place] [facility] [place].  Patient has dementia and is nonverbal and unable to provide case history information.  History was obtained from medical chart and staff.  Patient was transferred from the nursing home to the ED on 1/7/18 due to choking with PO intake.  She was seen by speech therapy and was declared unsafe for PO intake.  Patient has developed aspiration pneumonia due to aspiration.  She has been receiving nutrition/hydration via NG tube.  Medical, surgical, social, and family histories were reviewed and are listed below.        Review of Systems  History unable to be obtained from the patient due to dementia      ACTIVE PROBLEMS:    Specialty Problems     None          PAST MEDICAL HISTORY:  Past Medical History:   Diagnosis Date   • Alzheimer disease    • Anxiety    • Dementia    • Dermatopolymyositis    • Dysphagia    • Hypertension    • Hypothyroid    • Malnutrition    • Muscle spasm    • Quadriparesis    • Vitamin D deficiency        SURGICAL HISTORY:  History reviewed. No pertinent surgical history.    FAMILY HISTORY:  Family History   Problem Relation Age of Onset   • Family history unknown: Yes       SOCIAL HISTORY:  Social History   Substance Use Topics   • Smoking status: Never Smoker   • Smokeless tobacco: Never Used   • Alcohol use No       CURRENT MEDICATION:    Current Facility-Administered Medications:   •  acetaminophen (TYLENOL) 160 MG/5ML solution 650 mg, 650 mg, Nasogastric, Q6H PRN, Wil Felix MD, 650 mg at 01/16/18 1412  •  amoxicillin-clavulanate (AUGMENTIN) 500-125 MG per tablet 500 mg, 1 tablet, Nasogastric, Q12H, Wil Felix MD, 500 mg at 01/16/18 0901  •  aspirin chewable tablet 81 mg, 81 mg, Oral, Daily, Wil Felix MD, 81 mg at 01/16/18 0901  •  bacitracin ointment, , Topical, Q12H, Wil Felix MD  •  ipratropium-albuterol (DUO-NEB) nebulizer solution 3 mL, 3 mL, Nebulization, Q6H PRN, Tammy Bueno MD, 3 mL at 01/16/18 0742  •  lactobacillus acidophilus (RISAQUAD) capsule 1 capsule, 1 capsule, Per G Tube, Daily, Rand Benavidez, [facility], 1 capsule at 01/16/18 0901  •  levothyroxine (SYNTHROID, LEVOTHROID) tablet 100 mcg, 100 mcg, Nasogastric, Q AM, Wil Felix MD, 100 mcg at 01/16/18 0614  •  Magnesium Sulfate 2 gram Bolus, followed by 8 gram infusion (total Mg dose 10 grams)- Mg less than or equal to 1mg/dL, 2 g, Intravenous, PRN **OR** Magnesium Sulfate 6 gram Infusion (2 gm x 3) -Mg 1.1 -1.5 mg/dL, 2 g, Intravenous, PRN **OR** magnesium sulfate 4 gram infusion- Mg 1.6-1.9 mg/dL, 4 g, Intravenous, PRN, Alba Johnson PA-C  •  potassium & sodium phosphates (PHOS-NAK) oral packet, 1 packet,  Oral, BID AC, Rafi Kidd MD, 1 packet at 01/16/18 0614  •  potassium chloride (MICRO-K) CR capsule 40 mEq, 40 mEq, Oral, PRN **OR** potassium chloride (KLOR-CON) packet 40 mEq, 40 mEq, Oral, PRN, 40 mEq at 01/12/18 0638 **OR** potassium chloride 10 mEq in 100 mL IVPB, 10 mEq, Intravenous, Q1H PRN, Alba Johnson PA-C  •  potassium phosphate 45 mmol in sodium chloride 0.9 % 500 mL infusion, 45 mmol, Intravenous, PRN **OR** potassium phosphate 30 mmol in sodium chloride 0.9 % 250 mL infusion, 30 mmol, Intravenous, PRN **OR** potassium phosphate 15 mmol in sodium chloride 0.9 % 100 mL infusion, 15 mmol, Intravenous, PRN **OR** sodium phosphates 45 mmol in sodium chloride 0.9 % 500 mL IVPB, 45 mmol, Intravenous, PRN **OR** sodium phosphates 30 mmol in sodium chloride 0.9 % 250 mL IVPB, 30 mmol, Intravenous, PRN **OR** sodium phosphates 15 mmol in sodium chloride 0.9 % 250 mL IVPB, 15 mmol, Intravenous, PRN, Alba Johnson PA-C  •  sodium chloride 0.9 % flush 1-10 mL, 1-10 mL, Intravenous, PRN, Tammy Bueno MD  •  Insert peripheral IV, , , Once **AND** sodium chloride 0.9 % flush 10 mL, 10 mL, Intravenous, PRN, Mague Herman, APRN    ALLERGIES:  Aricept [donepezil hcl]     VITAL SIGNS:  Vital Signs (last 24 hours)       01/15 0700  -  01/16 0659 01/16 0700  -  01/16 1542   Most Recent    Temp (°F) 97.7 -  98.2    97.9 -  98.4     98.4 (36.9)    Heart Rate 54 -  82    51 -  77     75    Resp   18    18 -  20     20    BP 95/68 -  109/58    90/60 -  113/77     113/77    SpO2 (%) 96 -  99      99     99          PHYSICAL EXAMINATION    General Appearance:    Alert, nonverbal, in no acute distress; thin-appearing   Head:    Normocephalic, without obvious abnormality, atraumatic   Eyes:            Lids and lashes normal, conjunctivae and sclerae normal, no   icterus, no pallor, corneas clear, PERRLA   Ears:    Ears appear intact with no abnormalities noted   Throat:   No oral lesions, no thrush,  oral mucosa moist   Neck:   No adenopathy, supple, trachea midline, no thyromegaly, no   carotid bruit, no JVD   Back:     No kyphosis present, no scoliosis present, no skin lesions,      erythema or scars, no tenderness to percussion or                   palpation,   range of motion normal   Lungs:     Clear to auscultation,respirations regular, even and                  unlabored    Heart:    Regular rhythm and normal rate, normal S1 and S2, no            murmur, no gallop, no rub, no click   Chest Wall:    No abnormalities observed   Abdomen:     Normal bowel sounds, no masses, no organomegaly, soft        non-tender, non-distended, no guarding, no rebound                tenderness   Rectal:     Deferred   Extremities:   Decreased strength;  no edema, no cyanosis, no             redness   Pulses:   Pulses palpable and equal bilaterally   Skin:   No bleeding, bruising or rash   Lymph nodes:   No palpable adenopathy   Neurologic:   Confused and nonverbal due to dementia       LABS  Lab Results (last 24 hours)     ** No results found for the last 24 hours. **          IMAGING  Imaging Results (last 72 hours)     Procedure Component Value Units Date/Time    XR Chest AP [[id number]] Collected:  01/14/18 0811     Updated:  01/14/18 0820    Narrative:       EXAMINATION: XR CHEST AP-      CLINICAL INDICATION:     follow up pneumonia; J69.0-Pneumonitis due to  inhalation of food and vomit; A41.9-Sepsis, unspecified organism     TECHNIQUE:  XR CHEST AP-      COMPARISON: 01/11/2018      FINDINGS:   NG tube within stomach. Chronic interstitial changes stable. Right lung  interstitial opacities improved.       Impression:       Improvement in right lung opacities. NG tube within stomach.     This report was finalized on 1/14/2018 8:18 AM by Dr. Rubén Vickers MD.             Assessment/Plan   -dysphagia  -aspiration pneumonia  -Alzheimer's dementia  -acute hypoxic respiratory failure  -Severe sepsis  -Adult failure to  thrive  -severe protein calorie malnutrition    The patient will be scheduled for a G-tube placement for 18.  Thank you for allowing us to participate in the care of your patient.      Electronically signed by: ANKUR Aaron     Electronically signed by ANKUR Aaron at 2018  4:00 PM           Nutrition Notes (most recent note)      Tami Price RD at 2018  3:20 PM  Version 1 of 1         Nutrition Services    Patient Name:  Baldo Eden  YOB: 1955  MRN: [id number]  Admit Date:  2018    Tf of Jevity 1.2 tomás at goal of 30 ml/hr to provide: 864 kcal (19 kcal/kg r/t risk for refeeding syndrome), 40 g pro (0.8 g/kg), 581 cc/d fluid.  IVF now dc'd.  Will ADD H2O flush of 25 ml/hr to provide: 600 cc/d.  With TF and flush, fluid provided at 1181 cc/d.  RD will follow.    Electronically signed by:  aTmi Price RD  18 3:20 PM      Electronically signed by Tami Pirce RD at 2018  3:25 PM        Physical Therapy Notes (most recent note)     No notes of this type exist for this encounter.        Occupational Therapy Notes (most recent note)     No notes of this type exist for this encounter.           Speech Language Pathology Notes (most recent note)      Yuki Holman MA,CCC-SLP at 2018 10:58 AM  Version 1 of 1         Acute Care - Speech Language Pathology   Swallow Follow Up  Dominic     Patient Name: Baldo Eden  : 1955  MRN: [id number]  Today's Date: 2018     Admit Date: 2018    Mrs. Eden is seen at bedside this am on 3N. RN, Bre, is present at bedside and denies any significant change/improvement in status. Mrs. Eden continues generally nonresponsive to stimuli, non-interactive, unable to follow commands.  She is resting comfortably on SLP entry, primary oral respiration pattern. She continues w/ NG tube for alternative nutrition/hydration/medication. She continues unsafe for po intake at this  time. Noted in MD progress note plan to proceed w/ long term alternative nutritional support pending discussion w/ family. Per prolonged status of pt unable to functionally participate in formal dysphagia evaluation across this hospitalization, no further SLP f/u warranted at this time.     D/w RN pt status w/ verbal agreement.     Thank you-  Yuki Holman M.A., CCC-SLP     Visit Dx:     ICD-10-CM ICD-9-CM   1. Aspiration pneumonia of right lower lobe, unspecified aspiration pneumonia type J69.0 507.0   2. Sepsis, due to unspecified organism A41.9 038.9     995.91     Patient Active Problem List   Diagnosis   • Aspiration pneumonia of right lower lobe     Past Medical History:   Diagnosis Date   • Alzheimer disease    • Anxiety    • Dementia    • Dermatopolymyositis    • Dysphagia    • Hypertension    • Hypothyroid    • Malnutrition    • Muscle spasm    • Quadriparesis    • Vitamin D deficiency      History reviewed. No pertinent surgical history.    EDUCATION  The patient has been educated in the following areas:   Dysphagia (Swallowing Impairment) Oral Care/Hydration NPO rationale.    SLP Recommendation and Plan  SLP to f/u for pt status.      Time Calculation:         Time Calculation- SLP       01/16/18 1057          Time Calculation- SLP    SLP - Next Appointment 01/17/18  -        User Key  (r) = Recorded By, (t) = Taken By, (c) = Cosigned By    Initials Name Provider Type     Yuki Homlan MA,[facility]-SLP Speech Therapist          SLP G-Codes  Functional Limitations: Swallowing  Swallow Current Status (): At least 80 percent but less than 100 percent impaired, limited or restricted  Swallow Goal Status (): At least 40 percent but less than 60 percent impaired, limited or restricted    Yuki Holman MA,CCC-SLP  1/16/2018     Electronically signed by Yuki Holman MA,CCC-SLP at 1/16/2018 11:33 AM           Respiratory Therapy Notes (most recent note)      Rosetta  Gianna Strickland RRT at 2018  7:16 AM  Version 1 of 1         No skin issues due to use of respiratory equipment.     Electronically signed by Rosetta Strickland RRT at 2018 11:52 AM               Discharge Summary      Yordan Knox MD at 2018 12:40 PM              [facility] HOSPITALIST MEDICINE DISCHARGE SUMMARY    Patient Identification:  Name:  Baldo Eden  Age:  62 y.o.  Sex:  female  :  1955  MRN:  [id number]  Visit Number:  [id number]    Date of Admission: 2018  Date of Discharge:  2018     PCP: Kashmir Garcia MD    DISCHARGE DIAGNOSIS  Aspiration pneumonia  Acute hypoxic respiratory failure  Severe sepsis, resolved  End-stage Alzheimer's dementia  Failure to thrive  Severe protein calorie malnutrition    CONSULTS   Pulmonology  Gastroenterology  Nephrology  Palliative care    PROCEDURES PERFORMED      HOSPITAL COURSE  Patient is a 62 y.o. female presented [facility]asp[facility] in the nursing home.  Please see the admitting history and physical for further details.   she was found to be in acute hypoxic respiratory failure and sepsis due to aspiration pneumonia.  She was started on oxygen, bronchodilators, and appropriate antibiotics.  She was also quite hypernatremic due to dehydration, so fluid resuscitation was performed.  She failed a swallow study so an NG tube was placed to provide nutrition while her family decided on whether or not to place a G-tube.  They finally agreed and gastroenterology was consulted and a gastric feeding tube was placed. Palliative care was consult with the help family negotiate goals.  The patient is a DNR.  She was felt to be in stable enough condition from a medical standpoint to return to the nursing facility after she had her G-tube placed.  She was discharged in improved condition.      VITAL SIGNS:  Last 3 weights    01/10/18  0500 18  0517 18  0500   Weight: 45.4 kg (100 lb 0.3 oz)  50 kg (110 lb 3.2 oz) 45.9 kg (101 lb 1.6 oz)     Body mass index is 14.1 kg/(m^2).    PHYSICAL EXAM:  ConstitCachectic and elderly. No respiratory distress.      HENT:  Head: Normocephalic and atraumatic.  Mouth:  Moist mucous membranes.    Eyes:  Conjunctivae and EOM are normal.  Pupils are equal, round, and reactive to light.  No scleral icterus.  Neck:  Neck supple.  No JVD present.    Cardiovascular:  Normal rate, regular rhythm and normal heart sounds with no murmur.  Pulmonary/Chest:  No respiratory distress, no wheezes, no crackles, with normal breath sounds and good air movement.  Abdominal:  Soft.  Bowel sounds are normal.  No distension and no tenderness.   Musculoskeletal:  No edema, no tenderness, and no deformity.  No red or swollen joints anywhere.    Neurology: awake and confused and nonverbal. No cranial nerve deficit.  No tongue deviation.  No facial droop.  No slurred speech.   Skin:  Skin is warm and dry.  No rash noted.  No pallor.   Psychiatric:  Normal mood and affect.  Behavior is normal.  Judgment and thought content normal.   Peripheral vascular:  No edema and strong pulses on all 4 extremities.  Genitourinary:    DISCHARGE DISPOSITION   Stable    DISCHARGE MEDICATIONS:   Baldo Eden   Home Medication Instructions BAUDILIO:[id number]    Printed on:01/17/18 1240   Medication Information                      acetaminophen (TYLENOL) 160 MG/5ML solution  20.3 mL by Per G Tube route Every 6 (Six) Hours As Needed for Mild Pain .             amoxicillin-clavulanate (AUGMENTIN) 500-125 MG per tablet  1 tablet by Per G Tube route Every 12 (Twelve) Hours for 5 days. Indications: Pneumonia             bacitracin 500 UNIT/GM ointment  Apply  topically Every 12 (Twelve) Hours.             baclofen (LIORESAL) 10 MG tablet  Take 5 mg by mouth 2 (Two) Times a Day.             cholecalciferol (VITAMIN D3) 1000 units tablet  Take 1,000 Units by mouth Daily.             diphenhydrAMINE (BENADRYL) 25 mg  capsule  Take 25 mg by mouth every night at bedtime.             levothyroxine (SYNTHROID, LEVOTHROID) 125 MCG tablet  Take 125 mcg by mouth Daily.             multivitamin (DAILY BLACK) tablet tablet  Take 1 tablet by mouth Daily.             ramipril (ALTACE) 5 MG capsule  Take 5 mg by mouth Daily.             senna (SENOKOT) 8.6 MG tablet tablet  Take 1 tablet by mouth Daily.             traMADol (ULTRAM) 50 MG tablet  Take 50 mg by mouth 3 (Three) Times a Day.                       Additional Instructions for the Follow-ups that You Need to Schedule     Discharge Follow-up with Specified Provider: Facility provider; 1 Week    As directed    To:  Facility provider    Follow Up:  1 Week                 Follow-up Information     Follow up with Kashmir Garcia MD .    Specialty:  Internal Medicine    Contact information:    [address] [place] [id number] [phone]            TEST  RESULTS PENDING AT DISCHARGE       Yordan Knox MD  01/17/18  12:40 PM    Please note that this discharge summary required more than 30 minutes to complete.       Electronically signed by Yordan Knox MD at 1/17/2018 12:47 PM        Discharge Order     Start     Ordered    01/17/18 1240  Discharge patient  Once     Expected Discharge Date:  01/17/18    Discharge Disposition:  Skilled Nursing Facility (DC - External)        01/17/18 1240

## 2018-01-17 NOTE — PLAN OF CARE
Problem: Patient Care Overview (Adult)  Goal: Plan of Care Review  Outcome: Ongoing (interventions implemented as appropriate)   01/17/18 0855   Coping/Psychosocial Response Interventions   Plan Of Care Reviewed With patient   Patient Care Overview   Progress no change     Goal: Adult Individualization and Mutuality  Outcome: Ongoing (interventions implemented as appropriate)    Goal: Discharge Needs Assessment  Outcome: Ongoing (interventions implemented as appropriate)      Problem: Skin Integrity Impairment, Risk/Actual (Adult)  Goal: Skin Integrity/Wound Healing  Outcome: Ongoing (interventions implemented as appropriate)    Goal: Identify Related Risk Factors and Signs and Symptoms  Outcome: Ongoing (interventions implemented as appropriate)   01/16/18 0708   Skin Integrity Impairment, Risk/Actual   Skin Integrity Impairment, Risk/Actual: Related Risk Factors cognitive impairment;fluid/nutrition status   Signs and Symptoms (Skin Integrity Impairment) bulla/blister/vesicle     Goal: Skin Integrity/Wound Healing  Outcome: Ongoing (interventions implemented as appropriate)   01/17/18 0855   Skin Integrity Impairment, Risk/Actual (Adult)   Skin Integrity/Wound Healing making progress toward outcome       Problem: Sepsis (Adult)  Goal: Signs and Symptoms of Listed Potential Problems Will be Absent or Manageable (Sepsis)  Outcome: Ongoing (interventions implemented as appropriate)   01/13/18 0427 01/17/18 0855   Sepsis   Problems Assessed (Sepsis) --  all   Problems Present (Sepsis) malnutrition (undernutrition);progression of infection --        Problem: Fall Risk (Adult)  Goal: Identify Related Risk Factors and Signs and Symptoms  Outcome: Ongoing (interventions implemented as appropriate)   01/16/18 0708 01/17/18 0855   Fall Risk   Fall Risk: Related Risk Factors gait/mobility problems;neuro disease/injury --    Fall Risk: Signs and Symptoms --  presence of risk factors     Goal: Absence of Falls  Outcome: Ongoing  (interventions implemented as appropriate)   01/17/18 0855   Fall Risk (Adult)   Absence of Falls making progress toward outcome       Problem: Dysphagia (Adult)  Goal: Identify Related Risk Factors and Signs and Symptoms  Outcome: Ongoing (interventions implemented as appropriate)   01/13/18 0427 01/16/18 0708   Dysphagia   Dysphagia: Related Risk Factors --  functional decline;neurological impairment;mental status altered   General Observations Signs and Symptoms (Dysphagia) --  recurring pneumonia   Oral Phase Dysphagia Signs and Symptoms (Dysphagia) --  poor tongue control   Pharyngeal Phase Dysphagia Signs and Symptoms (Dysphagia) --  aspiration clinically evident   Upper Esophageal Phase Dysphagia Signs and Symptoms (Dysphagia) difficulty with solid foods --      Goal: Functional/Safe Swallow  Outcome: Ongoing (interventions implemented as appropriate)   01/17/18 0855   Dysphagia (Adult)   Functional/Safe Swallow making progress toward outcome     Goal: Compensatory Techniques to Improve Safety/Function with Swallowing  Outcome: Ongoing (interventions implemented as appropriate)   01/17/18 0855   Dysphagia (Adult)   Compensatory Techniques to Improve Safety/Function with Swallowing making progress toward outcome       Problem: Anxiety (Adult)  Goal: Identify Related Risk Factors and Signs and Symptoms  Outcome: Ongoing (interventions implemented as appropriate)   01/16/18 0708   Anxiety   Related Risk Factors (Anxiety) cognitive status   Signs and Symptoms (Anxiety) agitation;body tremors/twitching/restless legs     Goal: Reduction/Resolution  Outcome: Ongoing (interventions implemented as appropriate)   01/17/18 0855   Anxiety (Adult)   Reduction/Resolution making progress toward outcome       Problem: Infection, Risk/Actual (Adult)  Goal: Identify Related Risk Factors and Signs and Symptoms  Outcome: Ongoing (interventions implemented as appropriate)   01/16/18 0708   Infection, Risk/Actual   Infection,  Risk/Actual: Related Risk Factors age extremes;exposure to microbes   Signs and Symptoms (Infection, Risk/Actual) weakness     Goal: Infection Prevention/Resolution  Outcome: Ongoing (interventions implemented as appropriate)   01/17/18 0855   Infection, Risk/Actual (Adult)   Infection Prevention/Resolution making progress toward outcome       Problem: Pressure Ulcer (Adult)  Goal: Signs and Symptoms of Listed Potential Problems Will be Absent or Manageable (Pressure Ulcer)  Outcome: Ongoing (interventions implemented as appropriate)   01/17/18 0855   Pressure Ulcer   Problems Assessed (Pressure Ulcer) all   Problems Present (Pressure Ulcer) wound progression/extension       Problem: Mobility, Physical Impaired (Adult)  Goal: Identify Related Risk Factors and Signs and Symptoms  Outcome: Ongoing (interventions implemented as appropriate)   01/16/18 0708   Mobility, Physical Impaired   Physical Mobility, Impaired: Related Risk Factors activity intolerance;malnutrition;disease process;cognitive impairment   Signs and Symptoms (Physical Mobility Impaired) inability to purposefully move in environment;limited ability to perform gross/fine motor skills     Goal: Enhanced Mobility Skills  Outcome: Ongoing (interventions implemented as appropriate)   01/17/18 0855   Mobility, Physical Impaired (Adult)   Enhanced Mobility Skills making progress toward outcome     Goal: Enhanced Functionality Ability  Outcome: Ongoing (interventions implemented as appropriate)   01/17/18 0855   Mobility, Physical Impaired (Adult)   Enhanced Functionality Ability making progress toward outcome       Problem: Respiratory Insufficiency (Adult)  Goal: Identify Related Risk Factors and Signs and Symptoms   01/09/18 0046 01/10/18 0107   Respiratory Insufficiency   Related Risk Factors (Respiratory Insufficiency) --  activity intolerance;bedrest   Signs and Symptoms (Respiratory Insufficiency) abnormal breath sounds;cough  (productive/ineffective);decreased oxygen saturation;level of consciousness change;respiratory rate alterations;sleep apnea/periodic apnea;sleeplessness/fatigue;use of accessory muscles --      Goal: Acid/Base Balance  Outcome: Ongoing (interventions implemented as appropriate)   01/17/18 0855   Respiratory Insufficiency (Adult)   Acid/Base Balance making progress toward outcome     Goal: Effective Ventilation  Outcome: Ongoing (interventions implemented as appropriate)   01/17/18 0855   Respiratory Insufficiency (Adult)   Effective Ventilation making progress toward outcome       Problem: Pressure Ulcer Risk (Mike Scale) (Adult,Obstetrics,Pediatric)  Goal: Identify Related Risk Factors and Signs and Symptoms  Outcome: Ongoing (interventions implemented as appropriate)   01/16/18 0708   Pressure Ulcer Risk (Mike Scale)   Related Risk Factors (Pressure Ulcer Risk (Mike Scale)) cognitive impairment     Goal: Skin Integrity  Outcome: Ongoing (interventions implemented as appropriate)   01/17/18 0855   Pressure Ulcer Risk (Mike Scale) (Adult,Obstetrics,Pediatric)   Skin Integrity making progress toward outcome

## 2018-01-17 NOTE — DISCHARGE SUMMARY
[facility] HOSPITALIST MEDICINE DISCHARGE SUMMARY    Patient Identification:  Name:  Baldo Eden  Age:  62 y.o.  Sex:  female  :  1955  MRN:  [id number]  Visit Number:  [id number]    Date of Admission: 2018  Date of Discharge:  2018     PCP: Kashmir Garcia MD    DISCHARGE DIAGNOSIS  Aspiration pneumonia  Acute hypoxic respiratory failure  Severe sepsis, resolved  End-stage Alzheimer's dementia  Failure to thrive  Severe protein calorie malnutrition    CONSULTS   Pulmonology  Gastroenterology  Nephrology  Palliative care    PROCEDURES PERFORMED      HOSPITAL COURSE  Patient is a 62 y.o. female presented to [facility] complaiaspiration event in the nursing home.  Please see the admitting history and physical for further details.   she was found to be in acute hypoxic respiratory failure and sepsis due to aspiration pneumonia.  She was started on oxygen, bronchodilators, and appropriate antibiotics.  She was also quite hypernatremic due to dehydration, so fluid resuscitation was performed.  She failed a swallow study so an NG tube was placed to provide nutrition while her family decided on whether or not to place a G-tube.  They finally agreed and gastroenterology was consulted and a gastric feeding tube was placed. Palliative care was consult with the help family negotiate goals.  The patient is a DNR.  She was felt to be in stable enough condition from a medical standpoint to return to the nursing facility after she had her G-tube placed.  She was discharged in improved condition.      VITAL SIGNS:  Last 3 weights    01/10/18  0500 18  0517 18  0500   Weight: 45.4 kg (100 lb 0.3 oz) 50 kg (110 lb 3.2 oz) 45.9 kg (101 lb 1.6 oz)     Body mass index is 14.1 kg/(m^2).    PHYSICAL EXAM:  ConstitCachectic and elderly. No respiratory distress.      HENT:  Head: Normocephalic and atraumatic.  Mouth:  Moist mucous membranes.    Eyes:  Conjunctivae and EOM are  normal.  Pupils are equal, round, and reactive to light.  No scleral icterus.  Neck:  Neck supple.  No JVD present.    Cardiovascular:  Normal rate, regular rhythm and normal heart sounds with no murmur.  Pulmonary/Chest:  No respiratory distress, no wheezes, no crackles, with normal breath sounds and good air movement.  Abdominal:  Soft.  Bowel sounds are normal.  No distension and no tenderness.   Musculoskeletal:  No edema, no tenderness, and no deformity.  No red or swollen joints anywhere.    Neurology: awake and confused and nonverbal. No cranial nerve deficit.  No tongue deviation.  No facial droop.  No slurred speech.   Skin:  Skin is warm and dry.  No rash noted.  No pallor.   Psychiatric:  Normal mood and affect.  Behavior is normal.  Judgment and thought content normal.   Peripheral vascular:  No edema and strong pulses on all 4 extremities.  Genitourinary:    DISCHARGE DISPOSITION   Stable    DISCHARGE MEDICATIONS:   Baldo Eden   Home Medication Instructions BAUDILIO:[id number]    Printed on:01/17/18 1240   Medication Information                      acetaminophen (TYLENOL) 160 MG/5ML solution  20.3 mL by Per G Tube route Every 6 (Six) Hours As Needed for Mild Pain .             amoxicillin-clavulanate (AUGMENTIN) 500-125 MG per tablet  1 tablet by Per G Tube route Every 12 (Twelve) Hours for 5 days. Indications: Pneumonia             bacitracin 500 UNIT/GM ointment  Apply  topically Every 12 (Twelve) Hours.             baclofen (LIORESAL) 10 MG tablet  Take 5 mg by mouth 2 (Two) Times a Day.             cholecalciferol (VITAMIN D3) 1000 units tablet  Take 1,000 Units by mouth Daily.             diphenhydrAMINE (BENADRYL) 25 mg capsule  Take 25 mg by mouth every night at bedtime.             levothyroxine (SYNTHROID, LEVOTHROID) 125 MCG tablet  Take 125 mcg by mouth Daily.             multivitamin (DAILY BLACK) tablet tablet  Take 1 tablet by mouth Daily.             ramipril (ALTACE) 5 MG capsule  Take  5 mg by mouth Daily.             senna (SENOKOT) 8.6 MG tablet tablet  Take 1 tablet by mouth Daily.             traMADol (ULTRAM) 50 MG tablet  Take 50 mg by mouth 3 (Three) Times a Day.                       Additional Instructions for the Follow-ups that You Need to Schedule     Discharge Follow-up with Specified Provider: Facility provider; 1 Week    As directed    To:  Facility provider    Follow Up:  1 Week                 Follow-up Information     Follow up with Kashmir Garcia MD .    Specialty:  Internal Medicine    Contact information:    [address] [place] [address] [phone]            TEST  RESULTS PENDING AT DISCHARGE       Yordan Knox MD  01/17/18  12:40 PM    Please note that this discharge summary required more than 30 minutes to complete.

## 2018-01-17 NOTE — ANESTHESIA PREPROCEDURE EVALUATION
Anesthesia Evaluation     Patient summary reviewed and Nursing notes reviewed   no history of anesthetic complications:  NPO Solid Status: > 8 hours  NPO Liquid Status: > 8 hours     Airway   Mallampati: II  TM distance: >3 FB  Neck ROM: full  no difficulty expected  Dental - normal exam     Pulmonary - normal exam   (+) pneumonia , decreased breath sounds,   (-) not a smoker  Cardiovascular - normal exam  Exercise tolerance: poor (<4 METS)    NYHA Classification: II    (+) hypertension,       Neuro/Psych  (+) neuromuscular disease, psychiatric history Anxiety, dementia, poor historian.,     GI/Hepatic/Renal/Endo    (+)  hypothyroidism,     Musculoskeletal (-) negative ROS    Abdominal  - normal exam    Bowel sounds: normal.   Substance History - negative use     OB/GYN negative ob/gyn ROS         Other - negative ROS                                               Anesthesia Plan    ASA 2     general     intravenous induction   Plan/risks discussed with: consent signed.

## 2018-01-17 NOTE — PROGRESS NOTES
Discharge Planning Assessment   [facility]     Patient Name: Baldo Eden  MRN: [id number]  Today's Date: 1/17/2018    Admit Date: 1/7/2018       Discharge Plan       01/17/18 1006    Case Management/Social Work Plan    Plan Pt is currently in the OR for PEG placement. SS discussed pt with Eddy PASCUAL. Pt will be able to [facility] when medically stable. Pt was admitted from [facility] and had a 14 day skilled bed hold upon admission on 1-7-18. SS to follow.     12:10 SS discussed pt with Dr. Knox. Pt may possibly be discharged back to [facility] today. SS to follow.     15:27 Pt is being discharged back to [facility] today. SS contacted [facility] per Yaneth and there is no bed available. [facility] per Ivett and pt has a bed available. SS faxed information including AVS to [facility]. Nurse to call report to [facility]. SS spoke to pt's spouse, Nika Beard who is agreeable for pt to be discharged back to [facility] today. SS to follow.     16:12 SS contacted [facility] per Francisca Maldonado who states being able to transport pt back to [facility]. SS completed [facility] PCS and faxed PCS and face sheet to [facility]. No other needs identified.         Discharge Placement     Facility/Agency Request Status Selected? Address Phone Number Fax Number    [facility] - No Request Sent     [address] [place] [address] [phone]        Expected Discharge Date and Time     Expected Discharge Date Expected Discharge Time    Jan 18, 2018           Lu Mayo

## 2018-01-17 NOTE — OP NOTE
01/17/18    ESOPHAGOGASTRODUODENOSCOPY WITH PERCUTANEOUS ENDOSCOPIC GASTROSTOMY TUBE INSERTION  Procedure Note    Baldo Eden  1/7/2018 - 1/17/2018    Pre-op Diagnosis: Dysphagia, aspiration, failure to thrive, dementia      Post-op Diagnosis: Successful PEG placement        Anesthesia: Per Anesthesia service, general anesthesia      Estimated Blood Loss: Negligible      Findings: EGD was performed.  No obvious abnormality was seen in the esophagus, stomach or proximal duodenum.  PEG placement was performed in standard fashion using sterile technique.  The skin of the abdominal wall was cleaned with antiseptic solution.  An introducer needle was then inserted through the abdominal wall into the gastric lumen.  A guidewire was passed through the introducer needle into the stomach.  The guidewire was grasped within the stomach using an endoscopic snare and withdrawn through the mouth using the endoscope.  A 20 Malay gastrostomy feeding tube was attached to the guidewire and pulled back through the mouth into the stomach and out through the abdominal wall.  The G-tube was secured by placing a retention strap around the tube at the abdominal wall.  Caution was used to avoid excessive tension on the G-tube.  The patient was re-endoscoped in order to confirm proper positioning of the feeding tube in the stomach.  After doing this, the scope was withdrawn and the procedure was concluded without complications.  The patient received prophylactic antibiotic coverage at the time of the procedure with CEFAZOLIN 1 gm IV.  She left the OR in stable and satisfactory condition.      Complications: None      Recommendations:   Okay to use G-tube  Okay to begin tube feeding  Routine G-tube care      Wei Sinclair III, MD     Date: 1/17/2018  Time: 10:48 AM     CC:  Dr. Yordan Knox

## 2018-01-17 NOTE — PLAN OF CARE
Problem: Patient Care Overview (Adult)  Goal: Plan of Care Review  Outcome: Ongoing (interventions implemented as appropriate)    Goal: Adult Individualization and Mutuality  Outcome: Ongoing (interventions implemented as appropriate)    Goal: Discharge Needs Assessment  Outcome: Ongoing (interventions implemented as appropriate)      Problem: Skin Integrity Impairment, Risk/Actual (Adult)  Goal: Identify Related Risk Factors and Signs and Symptoms  Outcome: Ongoing (interventions implemented as appropriate)    Goal: Skin Integrity/Wound Healing  Outcome: Ongoing (interventions implemented as appropriate)    Goal: Identify Related Risk Factors and Signs and Symptoms  Outcome: Ongoing (interventions implemented as appropriate)    Goal: Skin Integrity/Wound Healing  Outcome: Ongoing (interventions implemented as appropriate)      Problem: Sepsis (Adult)  Goal: Signs and Symptoms of Listed Potential Problems Will be Absent or Manageable (Sepsis)  Outcome: Ongoing (interventions implemented as appropriate)      Problem: Fall Risk (Adult)  Goal: Identify Related Risk Factors and Signs and Symptoms  Outcome: Ongoing (interventions implemented as appropriate)    Goal: Absence of Falls  Outcome: Ongoing (interventions implemented as appropriate)      Problem: Dysphagia (Adult)  Goal: Identify Related Risk Factors and Signs and Symptoms  Outcome: Ongoing (interventions implemented as appropriate)    Goal: Functional/Safe Swallow  Outcome: Ongoing (interventions implemented as appropriate)    Goal: Compensatory Techniques to Improve Safety/Function with Swallowing  Outcome: Ongoing (interventions implemented as appropriate)      Problem: Anxiety (Adult)  Goal: Identify Related Risk Factors and Signs and Symptoms  Outcome: Ongoing (interventions implemented as appropriate)    Goal: Reduction/Resolution  Outcome: Ongoing (interventions implemented as appropriate)      Problem: Infection, Risk/Actual (Adult)  Goal: Identify  Related Risk Factors and Signs and Symptoms  Outcome: Ongoing (interventions implemented as appropriate)    Goal: Infection Prevention/Resolution  Outcome: Ongoing (interventions implemented as appropriate)      Problem: Pressure Ulcer (Adult)  Goal: Signs and Symptoms of Listed Potential Problems Will be Absent or Manageable (Pressure Ulcer)  Outcome: Ongoing (interventions implemented as appropriate)      Problem: Mobility, Physical Impaired (Adult)  Goal: Identify Related Risk Factors and Signs and Symptoms  Outcome: Ongoing (interventions implemented as appropriate)    Goal: Enhanced Mobility Skills  Outcome: Ongoing (interventions implemented as appropriate)    Goal: Enhanced Functionality Ability  Outcome: Ongoing (interventions implemented as appropriate)      Problem: Respiratory Insufficiency (Adult)  Goal: Identify Related Risk Factors and Signs and Symptoms  Outcome: Ongoing (interventions implemented as appropriate)    Goal: Acid/Base Balance  Outcome: Ongoing (interventions implemented as appropriate)    Goal: Effective Ventilation  Outcome: Ongoing (interventions implemented as appropriate)      Problem: Pressure Ulcer Risk (Mike Scale) (Adult,Obstetrics,Pediatric)  Goal: Identify Related Risk Factors and Signs and Symptoms  Outcome: Ongoing (interventions implemented as appropriate)    Goal: Skin Integrity  Outcome: Ongoing (interventions implemented as appropriate)

## 2018-01-17 NOTE — NURSING NOTE
CALLED  CARMELITA TO LET HIM KNOW THAT MRS. MAE WOULD BE GOING DOWN FOR A GTUBE PLACEMENT AROUND LUNCH TIME, OR POSSIBLY EARLIER, DEPENDING ON IF THE OUTPATIENTS SHOW UP FOR THEIR PROCEDURES.

## 2018-01-17 NOTE — ANESTHESIA POSTPROCEDURE EVALUATION
Patient: Baldo Eden    Procedure Summary     Date Anesthesia Start Anesthesia Stop Room / Location    01/17/18 1036 1050 [facility] OR 10 / [facility]       Procedure Diagnosis Surgeon Provider    ESOPHAGOGASTRODUODENOSCOPY WITH PERCUTANEOUS ENDOSCOPIC GASTROSTOMY TUBE INSERTION (N/A Esophagus) Aspiration pneumonia of right lower lobe, unspecified aspiration pneumonia type; Dysphagia, unspecified type  (Aspiration pneumonia of right lower lobe, unspecified aspiration pneumonia type [J69.0]; Dysphagia, unspecified type [R13.10]) MD Vitaliy Rodriguez III, MD          Anesthesia Type: general  Last vitals  BP   117/57 (01/17/18 0958)   Temp   98 °F (36.7 °C) (01/17/18 0958)   Pulse   68 (01/17/18 0958)   Resp   18 (01/17/18 0958)     SpO2   99 % (01/17/18 0958)     Post Anesthesia Care and Evaluation    Patient location during evaluation: PACU  Patient participation: complete - patient participated  Level of consciousness: awake and alert  Pain score: 1  Pain management: adequate  Airway patency: patent  Anesthetic complications: No anesthetic complications  PONV Status: controlled  Cardiovascular status: acceptable  Respiratory status: acceptable  Hydration status: acceptable

## 2018-01-18 NOTE — PAYOR COMM NOTE
"[facility]  NPI:[id number]    Utilization Review  Contact: Melvi Saavedra RN  Phone: [phone]  Fax:[phone]    DISCHARGE NOTIFICATION  DISCHARGED TO SNF ON 01/17/2018   PENDING REFERENCE# [id number]  STILL AWAITING APPROVAL  Baldo Mae (62 y.o. Female)     Date of Birth Social Security Number Address Home Phone MRN    1955  [address] [place], [place] [address] [phone] [id number]    Hinduism Marital Status          Unknown        Admission Date Admission Type Admitting Provider Attending Provider Department, Room/Bed    1/8/18 Emergency Tammy Bueno MD  [address], 3344/1S    Discharge Date Discharge Disposition Discharge Destination        1/17/2018 Skilled Nursing Facility (DC - External)             Attending Provider: (none)    Allergies:  Aricept [Donepezil Hcl]    Isolation:  None   Infection:  None   Code Status:  Prior    Ht:  180.3 cm (71\")   Wt:  45.9 kg (101 lb 1.6 oz)    Admission Cmt:  None   Principal Problem:  None                Active Insurance as of 1/7/2018     Primary Coverage     Payor Plan Insurance Group Employer/Plan Group    [facility] BLUE [employer] EMPLOYEE [id number]     Payor Plan Address Payor Plan Phone Number Effective From Effective To    [address] [phone] 1/1/2018     [place] [address]       Subscriber Name Subscriber Birth Date Member ID       BALDO MAE 1955 [id number]           Secondary Coverage     Payor Plan Insurance Group Employer/Plan Group    [place]      Payor Plan Address Payor Plan Phone Number Effective From Effective To    [address] [phone] 10/7/2016     [place] [id number]       Subscriber Name Subscriber Birth Date Member ID       BALDO MAE 1955 [id number]                 Emergency Contacts      (Rel.) Home Phone Work Phone Mobile Phone    Amelia Clifton (Daughter) -- -- [phone]    " Libby Beth (Daughter) -- -- [phone]               Discharge Summary      Yordan Knox MD at 2018 12:40 PM              [facility] HOSPITALIST MEDICINE DISCHARGE SUMMARY    Patient Identification:  Name:  Baldo Eden  Age:  62 y.o.  Sex:  female  :  1955  MRN:  [id number]  Visit Number:  [id number]    Date of Admission: 2018  Date of Discharge:  2018     PCP: Kashmir Garcia MD    DISCHARGE DIAGNOSIS  Aspiration pneumonia  Acute hypoxic respiratory failure  Severe sepsis, resolved  End-stage Alzheimer's dementia  Failure to thrive  Severe protein calorie malnutrition    CONSULTS   Pulmonology  Gastroenterology  Nephrology  Palliative care    PROCEDURES PERFORMED      HOSPITAL COURSE  Patient is a 62 y.o. female presented to [facility] complaiaspiration event in the nursing home.  Please see the admitting history and physical for further details.   she was found to be in acute hypoxic respiratory failure and sepsis due to aspiration pneumonia.  She was started on oxygen, bronchodilators, and appropriate antibiotics.  She was also quite hypernatremic due to dehydration, so fluid resuscitation was performed.  She failed a swallow study so an NG tube was placed to provide nutrition while her family decided on whether or not to place a G-tube.  They finally agreed and gastroenterology was consulted and a gastric feeding tube was placed. Palliative care was consult with the help family negotiate goals.  The patient is a DNR.  She was felt to be in stable enough condition from a medical standpoint to return to the nursing facility after she had her G-tube placed.  She was discharged in improved condition.      VITAL SIGNS:  Last 3 weights    01/10/18  0500 18  0517 18  0500   Weight: 45.4 kg (100 lb 0.3 oz) 50 kg (110 lb 3.2 oz) 45.9 kg (101 lb 1.6 oz)     Body mass index is 14.1 kg/(m^2).    PHYSICAL EXAM:  ConstitCachectic and elderly. No  respiratory distress.      HENT:  Head: Normocephalic and atraumatic.  Mouth:  Moist mucous membranes.    Eyes:  Conjunctivae and EOM are normal.  Pupils are equal, round, and reactive to light.  No scleral icterus.  Neck:  Neck supple.  No JVD present.    Cardiovascular:  Normal rate, regular rhythm and normal heart sounds with no murmur.  Pulmonary/Chest:  No respiratory distress, no wheezes, no crackles, with normal breath sounds and good air movement.  Abdominal:  Soft.  Bowel sounds are normal.  No distension and no tenderness.   Musculoskeletal:  No edema, no tenderness, and no deformity.  No red or swollen joints anywhere.    Neurology: awake and confused and nonverbal. No cranial nerve deficit.  No tongue deviation.  No facial droop.  No slurred speech.   Skin:  Skin is warm and dry.  No rash noted.  No pallor.   Psychiatric:  Normal mood and affect.  Behavior is normal.  Judgment and thought content normal.   Peripheral vascular:  No edema and strong pulses on all 4 extremities.  Genitourinary:    DISCHARGE DISPOSITION   Stable    DISCHARGE MEDICATIONS:   Baldo Eden   Home Medication Instructions BAUDILIO:[id number]    Printed on:01/17/18 1240   Medication Information                      acetaminophen (TYLENOL) 160 MG/5ML solution  20.3 mL by Per G Tube route Every 6 (Six) Hours As Needed for Mild Pain .             amoxicillin-clavulanate (AUGMENTIN) 500-125 MG per tablet  1 tablet by Per G Tube route Every 12 (Twelve) Hours for 5 days. Indications: Pneumonia             bacitracin 500 UNIT/GM ointment  Apply  topically Every 12 (Twelve) Hours.             baclofen (LIORESAL) 10 MG tablet  Take 5 mg by mouth 2 (Two) Times a Day.             cholecalciferol (VITAMIN D3) 1000 units tablet  Take 1,000 Units by mouth Daily.             diphenhydrAMINE (BENADRYL) 25 mg capsule  Take 25 mg by mouth every night at bedtime.             levothyroxine (SYNTHROID, LEVOTHROID) 125 MCG tablet  Take 125 mcg by mouth  Daily.             multivitamin (DAILY BLACK) tablet tablet  Take 1 tablet by mouth Daily.             ramipril (ALTACE) 5 MG capsule  Take 5 mg by mouth Daily.             senna (SENOKOT) 8.6 MG tablet tablet  Take 1 tablet by mouth Daily.             traMADol (ULTRAM) 50 MG tablet  Take 50 mg by mouth 3 (Three) Times a Day.                       Additional Instructions for the Follow-ups that You Need to Schedule     Discharge Follow-up with Specified Provider: Facility provider; 1 Week    As directed    To:  Facility provider    Follow Up:  1 Week                 Follow-up Information     Follow up with Kashmir Garcia MD .    Specialty:  Internal Medicine    Contact information:    [address] [facility]foAt[facility] [address]  [phone]            TEST  RESULTS PENDING AT DISCHARGE       Yordan Knox MD  01/17/18  12:40 PM    Please note that this discharge summary required more than 30 minutes to complete.       Electronically signed by Yordan Knox MD at 1/17/2018 12:47 PM

## 2018-01-22 ENCOUNTER — LAB REQUISITION (OUTPATIENT)
Dept: LAB | Facility: HOSPITAL | Age: 63
End: 2018-01-22

## 2018-01-22 DIAGNOSIS — R50.9 FEVER: ICD-10-CM

## 2018-01-22 LAB
ANION GAP SERPL CALCULATED.3IONS-SCNC: 4.5 MMOL/L (ref 3.6–11.2)
BASOPHILS # BLD AUTO: 0.03 10*3/MM3 (ref 0–0.3)
BASOPHILS NFR BLD AUTO: 0.2 % (ref 0–2)
BUN BLD-MCNC: 11 MG/DL (ref 7–21)
BUN/CREAT SERPL: 21.6 (ref 7–25)
CALCIUM SPEC-SCNC: 9.1 MG/DL (ref 7.7–10)
CHLORIDE SERPL-SCNC: 105 MMOL/L (ref 99–112)
CO2 SERPL-SCNC: 27.5 MMOL/L (ref 24.3–31.9)
CREAT BLD-MCNC: 0.51 MG/DL (ref 0.43–1.29)
DEPRECATED RDW RBC AUTO: 48.1 FL (ref 37–54)
EOSINOPHIL # BLD AUTO: 0.26 10*3/MM3 (ref 0–0.7)
EOSINOPHIL NFR BLD AUTO: 1.4 % (ref 0–5)
ERYTHROCYTE [DISTWIDTH] IN BLOOD BY AUTOMATED COUNT: 14.9 % (ref 11.5–14.5)
GFR SERPL CREATININE-BSD FRML MDRD: 122 ML/MIN/1.73
GLUCOSE BLD-MCNC: 128 MG/DL (ref 70–110)
HCT VFR BLD AUTO: 35.3 % (ref 37–47)
HGB BLD-MCNC: 10.9 G/DL (ref 12–16)
IMM GRANULOCYTES # BLD: 0.07 10*3/MM3 (ref 0–0.03)
IMM GRANULOCYTES NFR BLD: 0.4 % (ref 0–0.5)
LYMPHOCYTES # BLD AUTO: 2.36 10*3/MM3 (ref 1–3)
LYMPHOCYTES NFR BLD AUTO: 12.7 % (ref 21–51)
MCH RBC QN AUTO: 29.1 PG (ref 27–33)
MCHC RBC AUTO-ENTMCNC: 30.9 G/DL (ref 33–37)
MCV RBC AUTO: 94.4 FL (ref 80–94)
MONOCYTES # BLD AUTO: 0.97 10*3/MM3 (ref 0.1–0.9)
MONOCYTES NFR BLD AUTO: 5.2 % (ref 0–10)
NEUTROPHILS # BLD AUTO: 14.96 10*3/MM3 (ref 1.4–6.5)
NEUTROPHILS NFR BLD AUTO: 80.1 % (ref 30–70)
OSMOLALITY SERPL CALC.SUM OF ELEC: 274.9 MOSM/KG (ref 273–305)
PLATELET # BLD AUTO: 347 10*3/MM3 (ref 130–400)
PMV BLD AUTO: 11.8 FL (ref 6–10)
POTASSIUM BLD-SCNC: 4 MMOL/L (ref 3.5–5.3)
RBC # BLD AUTO: 3.74 10*6/MM3 (ref 4.2–5.4)
SODIUM BLD-SCNC: 137 MMOL/L (ref 135–153)
WBC NRBC COR # BLD: 18.65 10*3/MM3 (ref 4.5–12.5)

## 2018-01-22 PROCEDURE — 80048 BASIC METABOLIC PNL TOTAL CA: CPT | Performed by: INTERNAL MEDICINE

## 2018-01-22 PROCEDURE — 85025 COMPLETE CBC W/AUTO DIFF WBC: CPT | Performed by: INTERNAL MEDICINE

## 2018-01-23 ENCOUNTER — HOSPITAL ENCOUNTER (EMERGENCY)
Facility: HOSPITAL | Age: 63
Discharge: HOME | End: 2018-01-23
Attending: EMERGENCY MEDICINE | Admitting: EMERGENCY MEDICINE

## 2018-01-23 ENCOUNTER — APPOINTMENT (OUTPATIENT)
Dept: GENERAL RADIOLOGY | Facility: HOSPITAL | Age: 63
End: 2018-01-23

## 2018-01-23 VITALS
TEMPERATURE: 98.9 F | BODY MASS INDEX: 13.2 KG/M2 | HEART RATE: 84 BPM | DIASTOLIC BLOOD PRESSURE: 70 MMHG | OXYGEN SATURATION: 94 % | RESPIRATION RATE: 20 BRPM | SYSTOLIC BLOOD PRESSURE: 114 MMHG | WEIGHT: 94.31 LBS | HEIGHT: 71 IN

## 2018-01-23 DIAGNOSIS — N39.0 ACUTE UTI: Primary | ICD-10-CM

## 2018-01-23 LAB
ALBUMIN SERPL-MCNC: 4 G/DL (ref 3.4–4.8)
ALBUMIN/GLOB SERPL: 1.1 G/DL (ref 1.5–2.5)
ALP SERPL-CCNC: 79 U/L (ref 35–104)
ALT SERPL W P-5'-P-CCNC: 11 U/L (ref 10–36)
AMORPH URATE CRY URNS QL MICRO: ABNORMAL /HPF
ANION GAP SERPL CALCULATED.3IONS-SCNC: 4.9 MMOL/L (ref 3.6–11.2)
APTT PPP: 39.4 SECONDS (ref 23.8–36.1)
AST SERPL-CCNC: 22 U/L (ref 10–30)
BACTERIA UR QL AUTO: ABNORMAL /HPF
BASOPHILS # BLD AUTO: 0.03 10*3/MM3 (ref 0–0.3)
BASOPHILS NFR BLD AUTO: 0.2 % (ref 0–2)
BILIRUB SERPL-MCNC: 0.5 MG/DL (ref 0.2–1.8)
BILIRUB UR QL STRIP: NEGATIVE
BUN BLD-MCNC: 12 MG/DL (ref 7–21)
BUN/CREAT SERPL: 21.1 (ref 7–25)
CALCIUM SPEC-SCNC: 9.1 MG/DL (ref 7.7–10)
CHLORIDE SERPL-SCNC: 104 MMOL/L (ref 99–112)
CLARITY UR: ABNORMAL
CO2 SERPL-SCNC: 28.1 MMOL/L (ref 24.3–31.9)
COLOR UR: ABNORMAL
CREAT BLD-MCNC: 0.57 MG/DL (ref 0.43–1.29)
D-LACTATE SERPL-SCNC: 1.5 MMOL/L (ref 0.5–2)
DEPRECATED RDW RBC AUTO: 48.4 FL (ref 37–54)
EOSINOPHIL # BLD AUTO: 0.29 10*3/MM3 (ref 0–0.7)
EOSINOPHIL NFR BLD AUTO: 2.1 % (ref 0–5)
ERYTHROCYTE [DISTWIDTH] IN BLOOD BY AUTOMATED COUNT: 14.9 % (ref 11.5–14.5)
GFR SERPL CREATININE-BSD FRML MDRD: 107 ML/MIN/1.73
GLOBULIN UR ELPH-MCNC: 3.5 GM/DL
GLUCOSE BLD-MCNC: 129 MG/DL (ref 70–110)
GLUCOSE UR STRIP-MCNC: NEGATIVE MG/DL
HCT VFR BLD AUTO: 35.5 % (ref 37–47)
HGB BLD-MCNC: 11.2 G/DL (ref 12–16)
HGB UR QL STRIP.AUTO: ABNORMAL
HYALINE CASTS UR QL AUTO: ABNORMAL /LPF
IMM GRANULOCYTES # BLD: 0.03 10*3/MM3 (ref 0–0.03)
IMM GRANULOCYTES NFR BLD: 0.2 % (ref 0–0.5)
INR PPP: 1.11 (ref 0.9–1.1)
KETONES UR QL STRIP: NEGATIVE
LEUKOCYTE ESTERASE UR QL STRIP.AUTO: ABNORMAL
LYMPHOCYTES # BLD AUTO: 1.87 10*3/MM3 (ref 1–3)
LYMPHOCYTES NFR BLD AUTO: 13.6 % (ref 21–51)
MCH RBC QN AUTO: 29.3 PG (ref 27–33)
MCHC RBC AUTO-ENTMCNC: 31.5 G/DL (ref 33–37)
MCV RBC AUTO: 92.9 FL (ref 80–94)
MONOCYTES # BLD AUTO: 0.92 10*3/MM3 (ref 0.1–0.9)
MONOCYTES NFR BLD AUTO: 6.7 % (ref 0–10)
NEUTROPHILS # BLD AUTO: 10.61 10*3/MM3 (ref 1.4–6.5)
NEUTROPHILS NFR BLD AUTO: 77.2 % (ref 30–70)
NITRITE UR QL STRIP: NEGATIVE
OSMOLALITY SERPL CALC.SUM OF ELEC: 275.3 MOSM/KG (ref 273–305)
PH UR STRIP.AUTO: >=9 [PH] (ref 5–8)
PLATELET # BLD AUTO: 345 10*3/MM3 (ref 130–400)
PMV BLD AUTO: 11.5 FL (ref 6–10)
POTASSIUM BLD-SCNC: 4.2 MMOL/L (ref 3.5–5.3)
PROT SERPL-MCNC: 7.5 G/DL (ref 6–8)
PROT UR QL STRIP: ABNORMAL
PROTHROMBIN TIME: 14.5 SECONDS (ref 11–15.4)
RBC # BLD AUTO: 3.82 10*6/MM3 (ref 4.2–5.4)
RBC # UR: ABNORMAL /HPF
REF LAB TEST METHOD: ABNORMAL
SODIUM BLD-SCNC: 137 MMOL/L (ref 135–153)
SP GR UR STRIP: 1.02 (ref 1–1.03)
SQUAMOUS #/AREA URNS HPF: ABNORMAL /HPF
TRANS CELLS #/AREA URNS HPF: ABNORMAL /HPF
UROBILINOGEN UR QL STRIP: ABNORMAL
WBC NRBC COR # BLD: 13.75 10*3/MM3 (ref 4.5–12.5)
WBC UR QL AUTO: ABNORMAL /HPF

## 2018-01-23 PROCEDURE — 80053 COMPREHEN METABOLIC PANEL: CPT | Performed by: EMERGENCY MEDICINE

## 2018-01-23 PROCEDURE — 87040 BLOOD CULTURE FOR BACTERIA: CPT | Performed by: EMERGENCY MEDICINE

## 2018-01-23 PROCEDURE — P9612 CATHETERIZE FOR URINE SPEC: HCPCS

## 2018-01-23 PROCEDURE — 81001 URINALYSIS AUTO W/SCOPE: CPT | Performed by: EMERGENCY MEDICINE

## 2018-01-23 PROCEDURE — 85025 COMPLETE CBC W/AUTO DIFF WBC: CPT | Performed by: EMERGENCY MEDICINE

## 2018-01-23 PROCEDURE — 96365 THER/PROPH/DIAG IV INF INIT: CPT

## 2018-01-23 PROCEDURE — 85730 THROMBOPLASTIN TIME PARTIAL: CPT | Performed by: EMERGENCY MEDICINE

## 2018-01-23 PROCEDURE — 71045 X-RAY EXAM CHEST 1 VIEW: CPT | Performed by: RADIOLOGY

## 2018-01-23 PROCEDURE — 99284 EMERGENCY DEPT VISIT MOD MDM: CPT

## 2018-01-23 PROCEDURE — 96375 TX/PRO/DX INJ NEW DRUG ADDON: CPT

## 2018-01-23 PROCEDURE — 85610 PROTHROMBIN TIME: CPT | Performed by: EMERGENCY MEDICINE

## 2018-01-23 PROCEDURE — 83605 ASSAY OF LACTIC ACID: CPT | Performed by: EMERGENCY MEDICINE

## 2018-01-23 PROCEDURE — 36415 COLL VENOUS BLD VENIPUNCTURE: CPT

## 2018-01-23 PROCEDURE — 87086 URINE CULTURE/COLONY COUNT: CPT | Performed by: EMERGENCY MEDICINE

## 2018-01-23 PROCEDURE — 96361 HYDRATE IV INFUSION ADD-ON: CPT

## 2018-01-23 PROCEDURE — 71045 X-RAY EXAM CHEST 1 VIEW: CPT

## 2018-01-23 PROCEDURE — 25010000002 KETOROLAC TROMETHAMINE PER 15 MG

## 2018-01-23 PROCEDURE — 25010000002 CEFTRIAXONE: Performed by: EMERGENCY MEDICINE

## 2018-01-23 RX ORDER — KETOROLAC TROMETHAMINE 30 MG/ML
20 INJECTION, SOLUTION INTRAMUSCULAR; INTRAVENOUS ONCE
Status: COMPLETED | OUTPATIENT
Start: 2018-01-23 | End: 2018-01-23

## 2018-01-23 RX ORDER — ACETAMINOPHEN 650 MG/1
650 SUPPOSITORY RECTAL ONCE
Status: COMPLETED | OUTPATIENT
Start: 2018-01-23 | End: 2018-01-23

## 2018-01-23 RX ORDER — KETOROLAC TROMETHAMINE 30 MG/ML
INJECTION, SOLUTION INTRAMUSCULAR; INTRAVENOUS
Status: COMPLETED
Start: 2018-01-23 | End: 2018-01-23

## 2018-01-23 RX ADMIN — SODIUM CHLORIDE 1000 ML: 9 INJECTION, SOLUTION INTRAVENOUS at 09:35

## 2018-01-23 RX ADMIN — ACETAMINOPHEN 650 MG: 650 SUPPOSITORY RECTAL at 09:38

## 2018-01-23 RX ADMIN — KETOROLAC TROMETHAMINE 20 MG: 30 INJECTION, SOLUTION INTRAMUSCULAR; INTRAVENOUS at 14:59

## 2018-01-23 RX ADMIN — CEFTRIAXONE 1 G: 1 INJECTION, POWDER, FOR SOLUTION INTRAMUSCULAR; INTRAVENOUS at 13:55

## 2018-01-23 RX ADMIN — SODIUM CHLORIDE 1000 ML: 9 INJECTION, SOLUTION INTRAVENOUS at 13:55

## 2018-01-23 RX ADMIN — KETOROLAC TROMETHAMINE 20 MG: 30 INJECTION, SOLUTION INTRAMUSCULAR at 14:59

## 2018-01-25 LAB — BACTERIA SPEC AEROBE CULT: NORMAL

## 2018-01-28 LAB
BACTERIA SPEC AEROBE CULT: NORMAL
BACTERIA SPEC AEROBE CULT: NORMAL

## 2018-02-03 NOTE — ED PROVIDER NOTES
Subjective   Patient is a 62 y.o. female presenting with female genitourinary complaint.   Female  Problem   Primary symptoms include dysuria. This is a recurrent problem. The symptoms occur during urination. Her LMP was days ago. Associated symptoms include frequency. Pertinent negatives include no abdominal pain.       Review of Systems   Constitutional: Negative.  Negative for fever.   HENT: Negative.    Respiratory: Negative.    Cardiovascular: Negative.  Negative for chest pain.   Gastrointestinal: Negative.  Negative for abdominal pain.   Endocrine: Negative.    Genitourinary: Positive for dysuria and frequency.   Skin: Negative.    Neurological: Negative.    Psychiatric/Behavioral: Negative.    All other systems reviewed and are negative.      Past Medical History:   Diagnosis Date   • Alzheimer disease    • Anxiety    • Dementia    • Dermatopolymyositis    • Dysphagia    • Hypertension    • Hypothyroid    • Malnutrition    • Muscle spasm    • Quadriparesis    • Vitamin D deficiency        Allergies   Allergen Reactions   • Aricept [Donepezil Hcl]        Past Surgical History:   Procedure Laterality Date   • ENDOSCOPY W/ PEG TUBE PLACEMENT N/A 1/17/2018    Procedure: ESOPHAGOGASTRODUODENOSCOPY WITH PERCUTANEOUS ENDOSCOPIC GASTROSTOMY TUBE INSERTION;  Surgeon: Wei Sinclair III, MD;  Location: [facility];  Service:        Family History   Problem Relation Age of Onset   • Family history unknown: Yes       Social History     Social History   • Marital status:      Spouse name: N/A   • Number of children: N/A   • Years of education: N/A     Social History Main Topics   • Smoking status: Never Smoker   • Smokeless tobacco: Never Used   • Alcohol use No   • Drug use: No   • Sexual activity: Defer     Other Topics Concern   • None     Social History Narrative           Objective   Physical Exam   Constitutional: She is oriented to person, place, and time. She appears well-developed and well-nourished.  No distress.   HENT:   Head: Normocephalic and atraumatic.   Right Ear: External ear normal.   Left Ear: External ear normal.   Nose: Nose normal.   Eyes: Conjunctivae and EOM are normal. Pupils are equal, round, and reactive to light.   Neck: Normal range of motion. Neck supple. No JVD present. No tracheal deviation present.   Cardiovascular: Normal rate, regular rhythm and normal heart sounds.    No murmur heard.  Pulmonary/Chest: Effort normal and breath sounds normal. No respiratory distress. She has no wheezes.   Abdominal: Soft. Bowel sounds are normal. There is no tenderness.   Musculoskeletal: Normal range of motion. She exhibits no edema or deformity.   Neurological: She is alert and oriented to person, place, and time. No cranial nerve deficit.   Skin: Skin is warm and dry. No rash noted. She is not diaphoretic. No erythema. No pallor.   Psychiatric: She has a normal mood and affect. Her behavior is normal. Thought content normal.   Nursing note and vitals reviewed.      Procedures         ED Course  ED Course                  MDM    Final diagnoses:   Acute UTI            Paulie Rene MD  02/03/18 8627

## 2018-08-14 ENCOUNTER — OFFICE VISIT (OUTPATIENT)
Dept: SURGERY | Facility: CLINIC | Age: 63
End: 2018-08-14

## 2018-08-14 VITALS — BODY MASS INDEX: 13.16 KG/M2 | WEIGHT: 94 LBS | HEIGHT: 71 IN

## 2018-08-14 DIAGNOSIS — K94.20 GASTROSTOMY COMPLICATION (HCC): Primary | ICD-10-CM

## 2018-08-14 PROCEDURE — 43760 PR CHANGE GASTROSTOMY TUBE PERCUTANEOUS W/O GUIDE: CPT | Performed by: SURGERY

## 2018-08-14 NOTE — PROGRESS NOTES
Subjective   Baldo Eden is a 63 y.o. female.     History of Present Illness She had a PEG placed Jan 2018. She has dementia and has had aspiration. She was sent here apparently for change of the G tube. No information was available and the patient cannot supply any information.     The following portions of the patient's history were reviewed and updated as appropriate: current medications, past family history, past medical history, past social history, past surgical history and problem list.    Review of Systems aspiration, Gastrostomy tube problems    Objective   Physical Exam  G tube is in place. No apparent problems around the tube but the tubing is old and worn. The PEG tube was pulled out and replaced with a gastrostomy tube    Assessment/Plan   Baldo was seen today for g tube.    Diagnoses and all orders for this visit:    Gastrostomy complication (CMS/[facility])    return prn

## 2018-09-13 ENCOUNTER — APPOINTMENT (OUTPATIENT)
Dept: GENERAL RADIOLOGY | Facility: HOSPITAL | Age: 63
End: 2018-09-13

## 2018-09-13 ENCOUNTER — HOSPITAL ENCOUNTER (EMERGENCY)
Facility: HOSPITAL | Age: 63
Discharge: HOME | End: 2018-09-13
Attending: EMERGENCY MEDICINE | Admitting: EMERGENCY MEDICINE

## 2018-09-13 VITALS
DIASTOLIC BLOOD PRESSURE: 59 MMHG | WEIGHT: 109.38 LBS | HEIGHT: 71 IN | RESPIRATION RATE: 20 BRPM | SYSTOLIC BLOOD PRESSURE: 105 MMHG | HEART RATE: 56 BPM | TEMPERATURE: 98.7 F | BODY MASS INDEX: 15.31 KG/M2 | OXYGEN SATURATION: 99 %

## 2018-09-13 DIAGNOSIS — K94.23 GASTROSTOMY TUBE DYSFUNCTION (HCC): Primary | ICD-10-CM

## 2018-09-13 PROCEDURE — 74018 RADEX ABDOMEN 1 VIEW: CPT

## 2018-09-13 PROCEDURE — 99283 EMERGENCY DEPT VISIT LOW MDM: CPT

## 2018-09-13 PROCEDURE — 74018 RADEX ABDOMEN 1 VIEW: CPT | Performed by: RADIOLOGY

## 2018-09-13 PROCEDURE — 74019 RADEX ABDOMEN 2 VIEWS: CPT | Performed by: RADIOLOGY

## 2018-09-13 NOTE — ED NOTES
[facility] advised that they will have a crew on its way.      Rossana Cooper  09/13/18 0838

## 2018-09-13 NOTE — ED NOTES
I spoke with [facility] of [facility] and he advised that he had no crews available, [facility] advised they would be able to take the pt.      Rossana Cooper P  09/13/18 1108

## 2018-09-13 NOTE — ED PROVIDER NOTES
Subjective   Patient is unable to ride any history.  Patient is a 63-year-old white female who presents from the nursing home secondary to G-tube problems.  No other complaints are voiced.            Review of Systems   Constitutional: Negative.  Negative for fever.   HENT: Negative.    Respiratory: Negative.    Cardiovascular: Negative.  Negative for chest pain.   Gastrointestinal: Negative.  Negative for abdominal pain.   Endocrine: Negative.    Genitourinary: Negative.  Negative for dysuria.   Skin: Negative.    Neurological: Negative.    Psychiatric/Behavioral: Negative.    All other systems reviewed and are negative.      Past Medical History:   Diagnosis Date   • Alzheimer disease    • Anxiety    • Dementia    • Dermatopolymyositis (CMS/HCC)    • Dysphagia    • Hypertension    • Hypothyroid    • Malnutrition (CMS/HCC)    • Muscle spasm    • Quadriparesis (CMS/HCC)    • Vitamin D deficiency        Allergies   Allergen Reactions   • Aricept [Donepezil Hcl]        Past Surgical History:   Procedure Laterality Date   • ENDOSCOPY W/ PEG TUBE PLACEMENT N/A 1/17/2018    Procedure: ESOPHAGOGASTRODUODENOSCOPY WITH PERCUTANEOUS ENDOSCOPIC GASTROSTOMY TUBE INSERTION;  Surgeon: Wei Sinclair III, MD;  Location: [facility];  Service:        Family History   Problem Relation Age of Onset   • Family history unknown: Yes       Social History     Social History   • Marital status:      Social History Main Topics   • Smoking status: Never Smoker   • Smokeless tobacco: Never Used   • Alcohol use No   • Drug use: No   • Sexual activity: Defer     Other Topics Concern   • Not on file           Objective   Physical Exam   Constitutional: She is oriented to person, place, and time. She appears well-developed and well-nourished. No distress.   HENT:   Head: Normocephalic and atraumatic.   Right Ear: External ear normal.   Left Ear: External ear normal.   Nose: Nose normal.   Eyes: Pupils are equal, round, and reactive to  light. Conjunctivae and EOM are normal.   Neck: Normal range of motion. Neck supple. No JVD present. No tracheal deviation present.   Cardiovascular: Normal rate, regular rhythm and normal heart sounds.    No murmur heard.  Pulmonary/Chest: Effort normal and breath sounds normal. No respiratory distress. She has no wheezes.   Abdominal: Soft. Bowel sounds are normal. There is no tenderness.   G tube won't flush   Musculoskeletal: Normal range of motion. She exhibits no edema or deformity.   Neurological: She is alert and oriented to person, place, and time. No cranial nerve deficit.   Skin: Skin is warm and dry. No rash noted. She is not diaphoretic. No erythema. No pallor.   Psychiatric: She has a normal mood and affect. Her behavior is normal. Thought content normal.   Nursing note and vitals reviewed.      Feeding Tube Replacement  Date/Time: 9/13/2018 8:43 AM  Performed by: FRITZ JIM  Authorized by: CHAITANYA GARRIDO     Pre-procedure details:     Old tube type:  Gastrostomy    Old tube size:  18 Fr  Anesthesia (see MAR for exact dosages):     Anesthesia method:  None  Procedure details:     Patient position:  Recumbent    Procedure type:  Replacement    Tube type:  Gastrostomy    Tube size:  18 Fr    Bulb inflation volume:  20    Bulb inflation fluid:  Normal saline  Post-procedure details:     Placement/position confirmation:  Auscultation and x-ray    Placement difficulty:  None    Bleeding:  None    Patient tolerance of procedure:  Tolerated well, no immediate complications               ED Course  ED Course as of Sep 13 1254   Thu Sep 13, 2018   0917 Radiologist requested follow-up KUB image after having patient lay on the right side approximately 5 minutes.  [JI]      ED Course User Index  [JI] Fritz Jim PA                  MDM  Number of Diagnoses or Management Options  Gastrostomy tube dysfunction (CMS/HCC): new and requires workup     Amount and/or Complexity of Data Reviewed  Tests in the  radiology section of CPT®: reviewed and ordered  Independent visualization of images, tracings, or specimens: yes    Risk of Complications, Morbidity, and/or Mortality  Presenting problems: moderate          Final diagnoses:   Gastrostomy tube dysfunction (CMS/HCC)            Ezekiel Jim PA  09/13/18 125

## 2018-09-13 NOTE — ED NOTES
UNABLE TO FLUSH G-TUBE ABD AREA     Amarjit Benavidez RN  09/13/18 0815       Amarjit Benavidez RN  09/13/18 0815

## 2018-09-13 NOTE — ED NOTES
SAJAN RUSSELL PAC IN ROOM AND REMOVED OLD G-TUBE AND REPLACED WITH NEW TUBE 18 fr     Amarjit Benavidez RN  09/13/18 1805

## 2019-01-01 ENCOUNTER — APPOINTMENT (OUTPATIENT)
Dept: GENERAL RADIOLOGY | Facility: HOSPITAL | Age: 64
End: 2019-01-01

## 2019-01-01 ENCOUNTER — APPOINTMENT (OUTPATIENT)
Dept: CT IMAGING | Facility: HOSPITAL | Age: 64
End: 2019-01-01

## 2019-01-01 ENCOUNTER — LAB REQUISITION (OUTPATIENT)
Dept: LAB | Facility: HOSPITAL | Age: 64
End: 2019-01-01

## 2019-01-01 ENCOUNTER — HOSPITAL ENCOUNTER (EMERGENCY)
Facility: HOSPITAL | Age: 64
Discharge: HOME | End: 2019-11-12
Attending: EMERGENCY MEDICINE | Admitting: EMERGENCY MEDICINE

## 2019-01-01 ENCOUNTER — HOSPITAL ENCOUNTER (INPATIENT)
Facility: HOSPITAL | Age: 64
LOS: 6 days | End: 2019-12-21
Attending: FAMILY MEDICINE | Admitting: INTERNAL MEDICINE

## 2019-01-01 ENCOUNTER — APPOINTMENT (OUTPATIENT)
Dept: CARDIOLOGY | Facility: HOSPITAL | Age: 64
End: 2019-01-01

## 2019-01-01 VITALS
HEART RATE: 89 BPM | WEIGHT: 159.3 LBS | SYSTOLIC BLOOD PRESSURE: 122 MMHG | BODY MASS INDEX: 26.54 KG/M2 | DIASTOLIC BLOOD PRESSURE: 76 MMHG | TEMPERATURE: 103 F | HEIGHT: 65 IN | RESPIRATION RATE: 5 BRPM | OXYGEN SATURATION: 91 %

## 2019-01-01 VITALS
HEART RATE: 96 BPM | DIASTOLIC BLOOD PRESSURE: 77 MMHG | BODY MASS INDEX: 25.88 KG/M2 | WEIGHT: 161 LBS | OXYGEN SATURATION: 96 % | TEMPERATURE: 99.2 F | SYSTOLIC BLOOD PRESSURE: 111 MMHG | HEIGHT: 66 IN | RESPIRATION RATE: 28 BRPM

## 2019-01-01 DIAGNOSIS — B96.89 ACUTE BACTERIAL BRONCHITIS: Primary | ICD-10-CM

## 2019-01-01 DIAGNOSIS — R50.9 FEVER: ICD-10-CM

## 2019-01-01 DIAGNOSIS — R06.03 RESPIRATORY DISTRESS: Primary | ICD-10-CM

## 2019-01-01 DIAGNOSIS — J20.8 ACUTE BACTERIAL BRONCHITIS: Primary | ICD-10-CM

## 2019-01-01 DIAGNOSIS — J69.0 ASPIRATION PNEUMONIA, UNSPECIFIED ASPIRATION PNEUMONIA TYPE, UNSPECIFIED LATERALITY, UNSPECIFIED PART OF LUNG (HCC): ICD-10-CM

## 2019-01-01 LAB
A-A DO2: 26.5 MMHG (ref 0–300)
A-A DO2: 88.4 MMHG (ref 0–300)
ALBUMIN SERPL-MCNC: 3.33 G/DL (ref 3.5–5.2)
ALBUMIN SERPL-MCNC: 3.59 G/DL (ref 3.5–5.2)
ALBUMIN/GLOB SERPL: 0.8 G/DL
ALBUMIN/GLOB SERPL: 0.9 G/DL
ALP SERPL-CCNC: 88 U/L (ref 39–117)
ALP SERPL-CCNC: 99 U/L (ref 39–117)
ALT SERPL W P-5'-P-CCNC: 19 U/L (ref 1–33)
ALT SERPL W P-5'-P-CCNC: 24 U/L (ref 1–33)
ANION GAP SERPL CALCULATED.3IONS-SCNC: 11.5 MMOL/L (ref 5–15)
ANION GAP SERPL CALCULATED.3IONS-SCNC: 13.7 MMOL/L (ref 5–15)
ARTERIAL PATENCY WRIST A: POSITIVE
ARTERIAL PATENCY WRIST A: POSITIVE
AST SERPL-CCNC: 17 U/L (ref 1–32)
AST SERPL-CCNC: 23 U/L (ref 1–32)
ATMOSPHERIC PRESS: 730 MMHG
ATMOSPHERIC PRESS: 735 MMHG
BACTERIA SPEC AEROBE CULT: NO GROWTH
BACTERIA SPEC AEROBE CULT: NORMAL
BACTERIA SPEC AEROBE CULT: NORMAL
BACTERIA UR QL AUTO: ABNORMAL /HPF
BASE EXCESS BLDA CALC-SCNC: 5.2 MMOL/L (ref 0–2)
BASE EXCESS BLDA CALC-SCNC: 5.8 MMOL/L (ref 0–2)
BASOPHILS # BLD AUTO: 0.04 10*3/MM3 (ref 0–0.2)
BASOPHILS # BLD AUTO: 0.07 10*3/MM3 (ref 0–0.2)
BASOPHILS NFR BLD AUTO: 0.4 % (ref 0–1.5)
BASOPHILS NFR BLD AUTO: 0.6 % (ref 0–1.5)
BDY SITE: ABNORMAL
BDY SITE: ABNORMAL
BH CV ECHO MEAS - ACS: 1.8 CM
BH CV ECHO MEAS - AO MAX PG: 4.8 MMHG
BH CV ECHO MEAS - AO MEAN PG: 2.5 MMHG
BH CV ECHO MEAS - AO ROOT AREA (BSA CORRECTED): 1.5
BH CV ECHO MEAS - AO ROOT AREA: 6.2 CM^2
BH CV ECHO MEAS - AO ROOT DIAM: 2.8 CM
BH CV ECHO MEAS - AO V2 MAX: 109.9 CM/SEC
BH CV ECHO MEAS - AO V2 MEAN: 73 CM/SEC
BH CV ECHO MEAS - AO V2 VTI: 19.8 CM
BH CV ECHO MEAS - BSA(HAYCOCK): 1.9 M^2
BH CV ECHO MEAS - BSA: 1.8 M^2
BH CV ECHO MEAS - BZI_BMI: 27.5 KILOGRAMS/M^2
BH CV ECHO MEAS - BZI_METRIC_HEIGHT: 165.1 CM
BH CV ECHO MEAS - BZI_METRIC_WEIGHT: 74.8 KG
BH CV ECHO MEAS - EDV(CUBED): 85.9 ML
BH CV ECHO MEAS - EDV(MOD-SP4): 58 ML
BH CV ECHO MEAS - EDV(TEICH): 88.2 ML
BH CV ECHO MEAS - EF(CUBED): 51.3 %
BH CV ECHO MEAS - EF(MOD-SP4): 56.9 %
BH CV ECHO MEAS - EF(TEICH): 43.5 %
BH CV ECHO MEAS - ESV(CUBED): 41.8 ML
BH CV ECHO MEAS - ESV(MOD-SP4): 25 ML
BH CV ECHO MEAS - ESV(TEICH): 49.9 ML
BH CV ECHO MEAS - FS: 21.3 %
BH CV ECHO MEAS - IVS/LVPW: 0.96
BH CV ECHO MEAS - IVSD: 1.2 CM
BH CV ECHO MEAS - LA DIMENSION: 2.6 CM
BH CV ECHO MEAS - LA/AO: 0.94
BH CV ECHO MEAS - LV DIASTOLIC VOL/BSA (35-75): 31.8 ML/M^2
BH CV ECHO MEAS - LV MASS(C)D: 186.5 GRAMS
BH CV ECHO MEAS - LV MASS(C)DI: 102.3 GRAMS/M^2
BH CV ECHO MEAS - LV SYSTOLIC VOL/BSA (12-30): 13.7 ML/M^2
BH CV ECHO MEAS - LVIDD: 4.4 CM
BH CV ECHO MEAS - LVIDS: 3.5 CM
BH CV ECHO MEAS - LVLD AP4: 7 CM
BH CV ECHO MEAS - LVLS AP4: 5.9 CM
BH CV ECHO MEAS - LVOT AREA (M): 3.1 CM^2
BH CV ECHO MEAS - LVOT AREA: 3.2 CM^2
BH CV ECHO MEAS - LVOT DIAM: 2 CM
BH CV ECHO MEAS - LVPWD: 1.2 CM
BH CV ECHO MEAS - MV A MAX VEL: 75 CM/SEC
BH CV ECHO MEAS - MV E MAX VEL: 48.4 CM/SEC
BH CV ECHO MEAS - MV E/A: 0.64
BH CV ECHO MEAS - PA ACC SLOPE: 717.8 CM/SEC^2
BH CV ECHO MEAS - PA ACC TIME: 0.1 SEC
BH CV ECHO MEAS - PA PR(ACCEL): 33.1 MMHG
BH CV ECHO MEAS - SI(AO): 67 ML/M^2
BH CV ECHO MEAS - SI(CUBED): 24.1 ML/M^2
BH CV ECHO MEAS - SI(MOD-SP4): 18.1 ML/M^2
BH CV ECHO MEAS - SI(TEICH): 21 ML/M^2
BH CV ECHO MEAS - SV(AO): 122.2 ML
BH CV ECHO MEAS - SV(CUBED): 44 ML
BH CV ECHO MEAS - SV(MOD-SP4): 33 ML
BH CV ECHO MEAS - SV(TEICH): 38.4 ML
BILIRUB SERPL-MCNC: 0.5 MG/DL (ref 0.2–1.2)
BILIRUB SERPL-MCNC: 0.5 MG/DL (ref 0.2–1.2)
BILIRUB UR QL STRIP: NEGATIVE
BODY TEMPERATURE: 0 C
BODY TEMPERATURE: 37 C
BUN BLD-MCNC: 13 MG/DL (ref 8–23)
BUN BLD-MCNC: 17 MG/DL (ref 8–23)
BUN/CREAT SERPL: 21 (ref 7–25)
BUN/CREAT SERPL: 28.3 (ref 7–25)
CALCIUM SPEC-SCNC: 8.8 MG/DL (ref 8.6–10.5)
CALCIUM SPEC-SCNC: 9.4 MG/DL (ref 8.6–10.5)
CHLORIDE SERPL-SCNC: 100 MMOL/L (ref 98–107)
CHLORIDE SERPL-SCNC: 105 MMOL/L (ref 98–107)
CLARITY UR: ABNORMAL
CO2 BLDA-SCNC: 30.2 MMOL/L (ref 22–33)
CO2 BLDA-SCNC: 30.7 MMOL/L (ref 22–33)
CO2 SERPL-SCNC: 24.3 MMOL/L (ref 22–29)
CO2 SERPL-SCNC: 25.5 MMOL/L (ref 22–29)
COHGB MFR BLD: 0.9 % (ref 0–5)
COHGB MFR BLD: 0.9 % (ref 0–5)
COLOR UR: YELLOW
CREAT BLD-MCNC: 0.6 MG/DL (ref 0.57–1)
CREAT BLD-MCNC: 0.62 MG/DL (ref 0.57–1)
CRP SERPL-MCNC: 9.28 MG/DL (ref 0–0.5)
D-LACTATE SERPL-SCNC: 1.9 MMOL/L (ref 0.5–2)
DEPRECATED RDW RBC AUTO: 47.2 FL (ref 37–54)
DEPRECATED RDW RBC AUTO: 47.5 FL (ref 37–54)
EOSINOPHIL # BLD AUTO: 0.08 10*3/MM3 (ref 0–0.4)
EOSINOPHIL # BLD AUTO: 0.15 10*3/MM3 (ref 0–0.4)
EOSINOPHIL NFR BLD AUTO: 0.7 % (ref 0.3–6.2)
EOSINOPHIL NFR BLD AUTO: 1.3 % (ref 0.3–6.2)
ERYTHROCYTE [DISTWIDTH] IN BLOOD BY AUTOMATED COUNT: 14.6 % (ref 12.3–15.4)
ERYTHROCYTE [DISTWIDTH] IN BLOOD BY AUTOMATED COUNT: 14.8 % (ref 12.3–15.4)
FLUAV AG NPH QL: NEGATIVE
FLUAV AG NPH QL: POSITIVE
FLUBV AG NPH QL IA: NEGATIVE
FLUBV AG NPH QL IA: NEGATIVE
GAS FLOW AIRWAY: 2 LPM
GFR SERPL CREATININE-BSD FRML MDRD: 101 ML/MIN/1.73
GFR SERPL CREATININE-BSD FRML MDRD: 97 ML/MIN/1.73
GLOBULIN UR ELPH-MCNC: 3.8 GM/DL
GLOBULIN UR ELPH-MCNC: 4.4 GM/DL
GLUCOSE BLD-MCNC: 138 MG/DL (ref 65–99)
GLUCOSE BLD-MCNC: 179 MG/DL (ref 65–99)
GLUCOSE UR STRIP-MCNC: NEGATIVE MG/DL
HCO3 BLDA-SCNC: 29.1 MMOL/L (ref 20–26)
HCO3 BLDA-SCNC: 29.4 MMOL/L (ref 20–26)
HCT VFR BLD AUTO: 45.6 % (ref 34–46.6)
HCT VFR BLD AUTO: 48.9 % (ref 34–46.6)
HCT VFR BLD CALC: 46.5 % (ref 38–51)
HCT VFR BLD CALC: 48.1 % (ref 38–51)
HGB BLD-MCNC: 14.5 G/DL (ref 12–15.9)
HGB BLD-MCNC: 15.3 G/DL (ref 12–15.9)
HGB BLDA-MCNC: 15.2 G/DL (ref 13.5–17.5)
HGB BLDA-MCNC: 15.7 G/DL (ref 13.5–17.5)
HGB UR QL STRIP.AUTO: ABNORMAL
HOLD SPECIMEN: NORMAL
HOLD SPECIMEN: NORMAL
HOROWITZ INDEX BLD+IHG-RTO: 21 %
HOROWITZ INDEX BLD+IHG-RTO: 28 %
HYALINE CASTS UR QL AUTO: ABNORMAL /LPF
IMM GRANULOCYTES # BLD AUTO: 0.02 10*3/MM3 (ref 0–0.05)
IMM GRANULOCYTES # BLD AUTO: 0.04 10*3/MM3 (ref 0–0.05)
IMM GRANULOCYTES NFR BLD AUTO: 0.2 % (ref 0–0.5)
IMM GRANULOCYTES NFR BLD AUTO: 0.3 % (ref 0–0.5)
INR PPP: 0.98 (ref 0.9–1.1)
KETONES UR QL STRIP: NEGATIVE
LEUKOCYTE ESTERASE UR QL STRIP.AUTO: ABNORMAL
LYMPHOCYTES # BLD AUTO: 2.55 10*3/MM3 (ref 0.7–3.1)
LYMPHOCYTES # BLD AUTO: 3.14 10*3/MM3 (ref 0.7–3.1)
LYMPHOCYTES NFR BLD AUTO: 23.3 % (ref 19.6–45.3)
LYMPHOCYTES NFR BLD AUTO: 26.7 % (ref 19.6–45.3)
Lab: ABNORMAL
Lab: ABNORMAL
MAGNESIUM SERPL-MCNC: 2.1 MG/DL (ref 1.6–2.4)
MCH RBC QN AUTO: 27.5 PG (ref 26.6–33)
MCH RBC QN AUTO: 28 PG (ref 26.6–33)
MCHC RBC AUTO-ENTMCNC: 31.3 G/DL (ref 31.5–35.7)
MCHC RBC AUTO-ENTMCNC: 31.8 G/DL (ref 31.5–35.7)
MCV RBC AUTO: 86.5 FL (ref 79–97)
MCV RBC AUTO: 89.6 FL (ref 79–97)
METHGB BLD QL: 0.1 % (ref 0–3)
METHGB BLD QL: 0.2 % (ref 0–3)
MODALITY: ABNORMAL
MODALITY: ABNORMAL
MONOCYTES # BLD AUTO: 1.08 10*3/MM3 (ref 0.1–0.9)
MONOCYTES # BLD AUTO: 1.32 10*3/MM3 (ref 0.1–0.9)
MONOCYTES NFR BLD AUTO: 11.2 % (ref 5–12)
MONOCYTES NFR BLD AUTO: 9.9 % (ref 5–12)
NEUTROPHILS # BLD AUTO: 7.02 10*3/MM3 (ref 1.7–7)
NEUTROPHILS # BLD AUTO: 7.17 10*3/MM3 (ref 1.7–7)
NEUTROPHILS NFR BLD AUTO: 59.9 % (ref 42.7–76)
NEUTROPHILS NFR BLD AUTO: 65.5 % (ref 42.7–76)
NITRITE UR QL STRIP: NEGATIVE
NOTE: ABNORMAL
NOTE: ABNORMAL
NRBC BLD AUTO-RTO: 0 /100 WBC (ref 0–0.2)
NRBC BLD AUTO-RTO: 0 /100 WBC (ref 0–0.2)
NT-PROBNP SERPL-MCNC: 471.9 PG/ML (ref 5–900)
OXYHGB MFR BLDV: 91.9 % (ref 94–99)
OXYHGB MFR BLDV: 94 % (ref 94–99)
PCO2 BLDA: 36.8 MM HG (ref 35–45)
PCO2 BLDA: 40.9 MM HG (ref 35–45)
PCO2 TEMP ADJ BLD: 36.8 MM HG (ref 35–45)
PCO2 TEMP ADJ BLD: ABNORMAL MM[HG]
PH BLDA: 7.47 PH UNITS (ref 7.35–7.45)
PH BLDA: 7.51 PH UNITS (ref 7.35–7.45)
PH UR STRIP.AUTO: 7.5 [PH] (ref 5–8)
PH, TEMP CORRECTED: 7.51 PH UNITS
PH, TEMP CORRECTED: ABNORMAL
PLATELET # BLD AUTO: 261 10*3/MM3 (ref 140–450)
PLATELET # BLD AUTO: 279 10*3/MM3 (ref 140–450)
PMV BLD AUTO: 11.5 FL (ref 6–12)
PMV BLD AUTO: 12 FL (ref 6–12)
PO2 BLDA: 61.7 MM HG (ref 83–108)
PO2 BLDA: 71.9 MM HG (ref 83–108)
PO2 TEMP ADJ BLD: 61.7 MM HG (ref 83–108)
PO2 TEMP ADJ BLD: ABNORMAL MM[HG]
POTASSIUM BLD-SCNC: 4 MMOL/L (ref 3.5–5.2)
POTASSIUM BLD-SCNC: 4 MMOL/L (ref 3.5–5.2)
PROCALCITONIN SERPL-MCNC: 0.07 NG/ML (ref 0.1–0.25)
PROT SERPL-MCNC: 7.1 G/DL (ref 6–8.5)
PROT SERPL-MCNC: 8 G/DL (ref 6–8.5)
PROT UR QL STRIP: ABNORMAL
PROTHROMBIN TIME: 13.5 SECONDS (ref 11–15.4)
RBC # BLD AUTO: 5.27 10*6/MM3 (ref 3.77–5.28)
RBC # BLD AUTO: 5.46 10*6/MM3 (ref 3.77–5.28)
RBC # UR: ABNORMAL /HPF
REF LAB TEST METHOD: ABNORMAL
SAO2 % BLDCOA: 92.9 % (ref 94–99)
SAO2 % BLDCOA: 95 % (ref 94–99)
SODIUM BLD-SCNC: 138 MMOL/L (ref 136–145)
SODIUM BLD-SCNC: 142 MMOL/L (ref 136–145)
SP GR UR STRIP: >=1.03 (ref 1–1.03)
SQUAMOUS #/AREA URNS HPF: ABNORMAL /HPF
TROPONIN T SERPL-MCNC: <0.01 NG/ML (ref 0–0.03)
UROBILINOGEN UR QL STRIP: ABNORMAL
VENTILATOR MODE: ABNORMAL
VENTILATOR MODE: ABNORMAL
WBC NRBC COR # BLD: 10.94 10*3/MM3 (ref 3.4–10.8)
WBC NRBC COR # BLD: 11.74 10*3/MM3 (ref 3.4–10.8)
WBC UR QL AUTO: ABNORMAL /HPF
WHOLE BLOOD HOLD SPECIMEN: NORMAL
WHOLE BLOOD HOLD SPECIMEN: NORMAL

## 2019-01-01 PROCEDURE — 25010000002 LORAZEPAM PER 2 MG: Performed by: INTERNAL MEDICINE

## 2019-01-01 PROCEDURE — 36415 COLL VENOUS BLD VENIPUNCTURE: CPT

## 2019-01-01 PROCEDURE — 94799 UNLISTED PULMONARY SVC/PX: CPT

## 2019-01-01 PROCEDURE — 85025 COMPLETE CBC W/AUTO DIFF WBC: CPT | Performed by: FAMILY MEDICINE

## 2019-01-01 PROCEDURE — 71275 CT ANGIOGRAPHY CHEST: CPT

## 2019-01-01 PROCEDURE — 99232 SBSQ HOSP IP/OBS MODERATE 35: CPT | Performed by: INTERNAL MEDICINE

## 2019-01-01 PROCEDURE — 99239 HOSP IP/OBS DSCHRG MGMT >30: CPT | Performed by: INTERNAL MEDICINE

## 2019-01-01 PROCEDURE — 94660 CPAP INITIATION&MGMT: CPT

## 2019-01-01 PROCEDURE — 99231 SBSQ HOSP IP/OBS SF/LOW 25: CPT | Performed by: INTERNAL MEDICINE

## 2019-01-01 PROCEDURE — 25010000002 MORPHINE PER 10 MG: Performed by: INTERNAL MEDICINE

## 2019-01-01 PROCEDURE — 25010000002 MORPHINE PER 10 MG: Performed by: HOSPITALIST

## 2019-01-01 PROCEDURE — 87804 INFLUENZA ASSAY W/OPTIC: CPT | Performed by: FAMILY MEDICINE

## 2019-01-01 PROCEDURE — 87086 URINE CULTURE/COLONY COUNT: CPT | Performed by: FAMILY MEDICINE

## 2019-01-01 PROCEDURE — 25010000002 PIPERACILLIN-TAZOBACTAM: Performed by: INTERNAL MEDICINE

## 2019-01-01 PROCEDURE — 99223 1ST HOSP IP/OBS HIGH 75: CPT | Performed by: INTERNAL MEDICINE

## 2019-01-01 PROCEDURE — 93306 TTE W/DOPPLER COMPLETE: CPT | Performed by: INTERNAL MEDICINE

## 2019-01-01 PROCEDURE — 71045 X-RAY EXAM CHEST 1 VIEW: CPT

## 2019-01-01 PROCEDURE — 82805 BLOOD GASES W/O2 SATURATION: CPT

## 2019-01-01 PROCEDURE — 93306 TTE W/DOPPLER COMPLETE: CPT

## 2019-01-01 PROCEDURE — 0 IOVERSOL 74 % SOLUTION: Performed by: FAMILY MEDICINE

## 2019-01-01 PROCEDURE — 85610 PROTHROMBIN TIME: CPT | Performed by: FAMILY MEDICINE

## 2019-01-01 PROCEDURE — 25010000002 LORAZEPAM PER 2 MG: Performed by: HOSPITALIST

## 2019-01-01 PROCEDURE — 87804 INFLUENZA ASSAY W/OPTIC: CPT | Performed by: INTERNAL MEDICINE

## 2019-01-01 PROCEDURE — 84484 ASSAY OF TROPONIN QUANT: CPT | Performed by: FAMILY MEDICINE

## 2019-01-01 PROCEDURE — 25010000002 FUROSEMIDE PER 20 MG: Performed by: PHYSICIAN ASSISTANT

## 2019-01-01 PROCEDURE — 83605 ASSAY OF LACTIC ACID: CPT | Performed by: FAMILY MEDICINE

## 2019-01-01 PROCEDURE — 25010000002 MORPHINE PER 10 MG: Performed by: NURSE PRACTITIONER

## 2019-01-01 PROCEDURE — 25010000002 HYDROMORPHONE PER 4 MG: Performed by: NURSE PRACTITIONER

## 2019-01-01 PROCEDURE — 84145 PROCALCITONIN (PCT): CPT | Performed by: FAMILY MEDICINE

## 2019-01-01 PROCEDURE — 25010000002 PIPERACILLIN-TAZOBACTAM: Performed by: FAMILY MEDICINE

## 2019-01-01 PROCEDURE — 36600 WITHDRAWAL OF ARTERIAL BLOOD: CPT

## 2019-01-01 PROCEDURE — 99284 EMERGENCY DEPT VISIT MOD MDM: CPT

## 2019-01-01 PROCEDURE — 25010000002 HYDROMORPHONE PER 4 MG: Performed by: INTERNAL MEDICINE

## 2019-01-01 PROCEDURE — 25010000002 HYDROMORPHONE HCL-NACL 30-0.9 MG/30ML-% SOLUTION PREFILLED SYRINGE: Performed by: INTERNAL MEDICINE

## 2019-01-01 PROCEDURE — 25010000002 METHYLPREDNISOLONE PER 40 MG: Performed by: PHYSICIAN ASSISTANT

## 2019-01-01 PROCEDURE — 25010000002 HYDROMORPHONE HCL-NACL 30-0.9 MG/30ML-% SOLUTION PREFILLED SYRINGE: Performed by: FAMILY MEDICINE

## 2019-01-01 PROCEDURE — 71045 X-RAY EXAM CHEST 1 VIEW: CPT | Performed by: RADIOLOGY

## 2019-01-01 PROCEDURE — 71250 CT THORAX DX C-: CPT | Performed by: RADIOLOGY

## 2019-01-01 PROCEDURE — P9612 CATHETERIZE FOR URINE SPEC: HCPCS

## 2019-01-01 PROCEDURE — 81001 URINALYSIS AUTO W/SCOPE: CPT | Performed by: FAMILY MEDICINE

## 2019-01-01 PROCEDURE — 71275 CT ANGIOGRAPHY CHEST: CPT | Performed by: RADIOLOGY

## 2019-01-01 PROCEDURE — 82375 ASSAY CARBOXYHB QUANT: CPT

## 2019-01-01 PROCEDURE — 71250 CT THORAX DX C-: CPT

## 2019-01-01 PROCEDURE — 83050 HGB METHEMOGLOBIN QUAN: CPT

## 2019-01-01 PROCEDURE — 87040 BLOOD CULTURE FOR BACTERIA: CPT | Performed by: FAMILY MEDICINE

## 2019-01-01 PROCEDURE — 25010000002 METHYLPREDNISOLONE PER 125 MG

## 2019-01-01 PROCEDURE — 80053 COMPREHEN METABOLIC PANEL: CPT | Performed by: FAMILY MEDICINE

## 2019-01-01 PROCEDURE — 80053 COMPREHEN METABOLIC PANEL: CPT | Performed by: EMERGENCY MEDICINE

## 2019-01-01 PROCEDURE — 94640 AIRWAY INHALATION TREATMENT: CPT

## 2019-01-01 PROCEDURE — 25010000002 FUROSEMIDE PER 20 MG: Performed by: INTERNAL MEDICINE

## 2019-01-01 PROCEDURE — 93005 ELECTROCARDIOGRAM TRACING: CPT | Performed by: FAMILY MEDICINE

## 2019-01-01 PROCEDURE — 25010000002 HYDROMORPHONE PER 4 MG: Performed by: FAMILY MEDICINE

## 2019-01-01 PROCEDURE — 25010000002 LORAZEPAM PER 2 MG

## 2019-01-01 PROCEDURE — 86140 C-REACTIVE PROTEIN: CPT | Performed by: FAMILY MEDICINE

## 2019-01-01 PROCEDURE — 93010 ELECTROCARDIOGRAM REPORT: CPT | Performed by: INTERNAL MEDICINE

## 2019-01-01 PROCEDURE — 99285 EMERGENCY DEPT VISIT HI MDM: CPT

## 2019-01-01 PROCEDURE — 85025 COMPLETE CBC W/AUTO DIFF WBC: CPT | Performed by: EMERGENCY MEDICINE

## 2019-01-01 PROCEDURE — 83880 ASSAY OF NATRIURETIC PEPTIDE: CPT | Performed by: FAMILY MEDICINE

## 2019-01-01 PROCEDURE — 83735 ASSAY OF MAGNESIUM: CPT | Performed by: FAMILY MEDICINE

## 2019-01-01 RX ORDER — FUROSEMIDE 10 MG/ML
80 INJECTION INTRAMUSCULAR; INTRAVENOUS ONCE
Status: COMPLETED | OUTPATIENT
Start: 2019-01-01 | End: 2019-01-01

## 2019-01-01 RX ORDER — HYDROMORPHONE HYDROCHLORIDE 1 MG/ML
0.5 INJECTION, SOLUTION INTRAMUSCULAR; INTRAVENOUS; SUBCUTANEOUS EVERY 6 HOURS
Status: DISCONTINUED | OUTPATIENT
Start: 2019-01-01 | End: 2019-01-01

## 2019-01-01 RX ORDER — MELATONIN
1000 DAILY
Status: CANCELLED | OUTPATIENT
Start: 2019-01-01

## 2019-01-01 RX ORDER — HYDROMORPHONE HYDROCHLORIDE 1 MG/ML
0.5 INJECTION, SOLUTION INTRAMUSCULAR; INTRAVENOUS; SUBCUTANEOUS
Status: DISCONTINUED | OUTPATIENT
Start: 2019-01-01 | End: 2019-01-01 | Stop reason: HOSPADM

## 2019-01-01 RX ORDER — HEPARIN SODIUM 5000 [USP'U]/ML
5000 INJECTION, SOLUTION INTRAVENOUS; SUBCUTANEOUS EVERY 8 HOURS SCHEDULED
Status: DISCONTINUED | OUTPATIENT
Start: 2019-01-01 | End: 2019-01-01

## 2019-01-01 RX ORDER — METHYLPREDNISOLONE SODIUM SUCCINATE 40 MG/ML
40 INJECTION, POWDER, LYOPHILIZED, FOR SOLUTION INTRAMUSCULAR; INTRAVENOUS ONCE
Status: DISCONTINUED | OUTPATIENT
Start: 2019-01-01 | End: 2019-01-01

## 2019-01-01 RX ORDER — SENNA PLUS 8.6 MG/1
1 TABLET ORAL DAILY
Status: CANCELLED | OUTPATIENT
Start: 2019-01-01

## 2019-01-01 RX ORDER — ACETAMINOPHEN 650 MG/1
650 SUPPOSITORY RECTAL EVERY 4 HOURS PRN
Status: DISCONTINUED | OUTPATIENT
Start: 2019-01-01 | End: 2019-01-01 | Stop reason: HOSPADM

## 2019-01-01 RX ORDER — HYDROMORPHONE HYDROCHLORIDE 1 MG/ML
0.25 INJECTION, SOLUTION INTRAMUSCULAR; INTRAVENOUS; SUBCUTANEOUS
Status: DISCONTINUED | OUTPATIENT
Start: 2019-01-01 | End: 2019-01-01

## 2019-01-01 RX ORDER — DIPHENHYDRAMINE HCL 25 MG
25 CAPSULE ORAL NIGHTLY
Status: CANCELLED | OUTPATIENT
Start: 2019-01-01

## 2019-01-01 RX ORDER — HALOPERIDOL 5 MG/ML
2 INJECTION INTRAMUSCULAR EVERY 4 HOURS PRN
Status: DISCONTINUED | OUTPATIENT
Start: 2019-01-01 | End: 2019-01-01 | Stop reason: HOSPADM

## 2019-01-01 RX ORDER — HYDROMORPHONE HYDROCHLORIDE 1 MG/ML
0.5 INJECTION, SOLUTION INTRAMUSCULAR; INTRAVENOUS; SUBCUTANEOUS
Status: DISCONTINUED | OUTPATIENT
Start: 2019-01-01 | End: 2019-01-01

## 2019-01-01 RX ORDER — RAMIPRIL 2.5 MG/1
5 CAPSULE ORAL DAILY
Status: CANCELLED | OUTPATIENT
Start: 2019-01-01

## 2019-01-01 RX ORDER — SODIUM CHLORIDE 0.9 % (FLUSH) 0.9 %
10 SYRINGE (ML) INJECTION EVERY 12 HOURS SCHEDULED
Status: DISCONTINUED | OUTPATIENT
Start: 2019-01-01 | End: 2019-01-01 | Stop reason: HOSPADM

## 2019-01-01 RX ORDER — OMEGA-3S/DHA/EPA/FISH OIL/D3 300MG-1000
1000 CAPSULE ORAL DAILY
Status: DISCONTINUED | OUTPATIENT
Start: 2019-01-01 | End: 2019-01-01

## 2019-01-01 RX ORDER — LORAZEPAM 2 MG/ML
INJECTION INTRAMUSCULAR
Status: COMPLETED
Start: 2019-01-01 | End: 2019-01-01

## 2019-01-01 RX ORDER — LORAZEPAM 2 MG/ML
1 INJECTION INTRAMUSCULAR EVERY 4 HOURS PRN
Status: DISCONTINUED | OUTPATIENT
Start: 2019-01-01 | End: 2019-01-01

## 2019-01-01 RX ORDER — NALOXONE HCL 0.4 MG/ML
0.1 VIAL (ML) INJECTION
Status: DISCONTINUED | OUTPATIENT
Start: 2019-01-01 | End: 2019-01-01

## 2019-01-01 RX ORDER — L.ACID,PARA/B.BIFIDUM/S.THERM 8B CELL
1 CAPSULE ORAL DAILY
Status: DISCONTINUED | OUTPATIENT
Start: 2019-01-01 | End: 2019-01-01

## 2019-01-01 RX ORDER — MULTIVITAMIN
1 TABLET ORAL DAILY
Status: CANCELLED | OUTPATIENT
Start: 2019-01-01

## 2019-01-01 RX ORDER — MORPHINE SULFATE 2 MG/ML
1 INJECTION, SOLUTION INTRAMUSCULAR; INTRAVENOUS
Status: DISCONTINUED | OUTPATIENT
Start: 2019-01-01 | End: 2019-01-01

## 2019-01-01 RX ORDER — BACLOFEN 10 MG/1
5 TABLET ORAL 2 TIMES DAILY
Status: CANCELLED | OUTPATIENT
Start: 2019-01-01

## 2019-01-01 RX ORDER — SODIUM CHLORIDE 0.9 % (FLUSH) 0.9 %
10 SYRINGE (ML) INJECTION AS NEEDED
Status: DISCONTINUED | OUTPATIENT
Start: 2019-01-01 | End: 2019-01-01 | Stop reason: SDUPTHER

## 2019-01-01 RX ORDER — METHYLPREDNISOLONE SODIUM SUCCINATE 125 MG/2ML
INJECTION, POWDER, LYOPHILIZED, FOR SOLUTION INTRAMUSCULAR; INTRAVENOUS
Status: COMPLETED
Start: 2019-01-01 | End: 2019-01-01

## 2019-01-01 RX ORDER — MORPHINE SULFATE 2 MG/ML
1 INJECTION, SOLUTION INTRAMUSCULAR; INTRAVENOUS EVERY 4 HOURS PRN
Status: DISCONTINUED | OUTPATIENT
Start: 2019-01-01 | End: 2019-01-01

## 2019-01-01 RX ORDER — IPRATROPIUM BROMIDE AND ALBUTEROL SULFATE 2.5; .5 MG/3ML; MG/3ML
3 SOLUTION RESPIRATORY (INHALATION) EVERY 6 HOURS PRN
Status: DISCONTINUED | OUTPATIENT
Start: 2019-01-01 | End: 2019-01-01 | Stop reason: HOSPADM

## 2019-01-01 RX ORDER — ACETYLCYSTEINE 200 MG/ML
2 SOLUTION ORAL; RESPIRATORY (INHALATION) ONCE
Status: COMPLETED | OUTPATIENT
Start: 2019-01-01 | End: 2019-01-01

## 2019-01-01 RX ORDER — SODIUM CHLORIDE 0.9 % (FLUSH) 0.9 %
10 SYRINGE (ML) INJECTION AS NEEDED
Status: DISCONTINUED | OUTPATIENT
Start: 2019-01-01 | End: 2019-01-01 | Stop reason: HOSPADM

## 2019-01-01 RX ORDER — HEPARIN SODIUM 5000 [USP'U]/ML
5000 INJECTION, SOLUTION INTRAVENOUS; SUBCUTANEOUS EVERY 8 HOURS SCHEDULED
Status: DISCONTINUED | OUTPATIENT
Start: 2019-01-01 | End: 2019-01-01 | Stop reason: SDUPTHER

## 2019-01-01 RX ORDER — CLINDAMYCIN PHOSPHATE 600 MG/50ML
600 INJECTION INTRAVENOUS ONCE
Status: COMPLETED | OUTPATIENT
Start: 2019-01-01 | End: 2019-01-01

## 2019-01-01 RX ORDER — LEVOTHYROXINE SODIUM 0.15 MG/1
150 TABLET ORAL DAILY
Status: DISCONTINUED | OUTPATIENT
Start: 2019-01-01 | End: 2019-01-01

## 2019-01-01 RX ORDER — LEVOTHYROXINE SODIUM 0.15 MG/1
150 TABLET ORAL DAILY
Status: CANCELLED | OUTPATIENT
Start: 2019-01-01

## 2019-01-01 RX ORDER — DOXYCYCLINE 100 MG/1
100 CAPSULE ORAL 2 TIMES DAILY
Qty: 16 CAPSULE | Refills: 0 | Status: ON HOLD | OUTPATIENT
Start: 2019-01-01 | End: 2019-01-01

## 2019-01-01 RX ORDER — LEVOTHYROXINE SODIUM 0.15 MG/1
150 TABLET ORAL DAILY
COMMUNITY

## 2019-01-01 RX ORDER — MORPHINE SULFATE 2 MG/ML
2 INJECTION, SOLUTION INTRAMUSCULAR; INTRAVENOUS EVERY 6 HOURS SCHEDULED
Status: DISCONTINUED | OUTPATIENT
Start: 2019-01-01 | End: 2019-01-01

## 2019-01-01 RX ORDER — LORAZEPAM 2 MG/ML
2 INJECTION INTRAMUSCULAR EVERY 4 HOURS PRN
Status: DISCONTINUED | OUTPATIENT
Start: 2019-01-01 | End: 2019-01-01 | Stop reason: HOSPADM

## 2019-01-01 RX ORDER — MULTIVITAMIN
1 TABLET ORAL DAILY
Status: DISCONTINUED | OUTPATIENT
Start: 2019-01-01 | End: 2019-01-01

## 2019-01-01 RX ORDER — GLYCOPYRROLATE 0.2 MG/ML
0.4 INJECTION INTRAMUSCULAR; INTRAVENOUS EVERY 4 HOURS PRN
Status: DISCONTINUED | OUTPATIENT
Start: 2019-01-01 | End: 2019-01-01 | Stop reason: HOSPADM

## 2019-01-01 RX ORDER — SENNA PLUS 8.6 MG/1
1 TABLET ORAL DAILY
Status: DISCONTINUED | OUTPATIENT
Start: 2019-01-01 | End: 2019-01-01

## 2019-01-01 RX ORDER — TRAMADOL HYDROCHLORIDE 50 MG/1
50 TABLET ORAL 3 TIMES DAILY
Status: CANCELLED | OUTPATIENT
Start: 2019-01-01

## 2019-01-01 RX ORDER — METHYLPREDNISOLONE SODIUM SUCCINATE 40 MG/ML
40 INJECTION, POWDER, LYOPHILIZED, FOR SOLUTION INTRAMUSCULAR; INTRAVENOUS EVERY 12 HOURS
Status: DISCONTINUED | OUTPATIENT
Start: 2019-01-01 | End: 2019-01-01

## 2019-01-01 RX ORDER — IPRATROPIUM BROMIDE AND ALBUTEROL SULFATE 2.5; .5 MG/3ML; MG/3ML
3 SOLUTION RESPIRATORY (INHALATION)
Status: DISCONTINUED | OUTPATIENT
Start: 2019-01-01 | End: 2019-01-01

## 2019-01-01 RX ADMIN — HYDROMORPHONE HYDROCHLORIDE 0.25 MG: 1 INJECTION, SOLUTION INTRAMUSCULAR; INTRAVENOUS; SUBCUTANEOUS at 11:12

## 2019-01-01 RX ADMIN — MORPHINE SULFATE 2 MG: 2 INJECTION, SOLUTION INTRAMUSCULAR; INTRAVENOUS at 16:57

## 2019-01-01 RX ADMIN — LORAZEPAM 1 MG: 2 INJECTION INTRAMUSCULAR; INTRAVENOUS at 12:04

## 2019-01-01 RX ADMIN — METHYLPREDNISOLONE SODIUM SUCCINATE 40 MG: 125 INJECTION, POWDER, FOR SOLUTION INTRAMUSCULAR; INTRAVENOUS at 13:15

## 2019-01-01 RX ADMIN — HYDROMORPHONE HYDROCHLORIDE 0.25 MG: 1 INJECTION, SOLUTION INTRAMUSCULAR; INTRAVENOUS; SUBCUTANEOUS at 20:29

## 2019-01-01 RX ADMIN — IPRATROPIUM BROMIDE AND ALBUTEROL SULFATE 3 ML: .5; 3 SOLUTION RESPIRATORY (INHALATION) at 18:38

## 2019-01-01 RX ADMIN — SODIUM CHLORIDE, PRESERVATIVE FREE 10 ML: 5 INJECTION INTRAVENOUS at 03:49

## 2019-01-01 RX ADMIN — SODIUM CHLORIDE, PRESERVATIVE FREE 10 ML: 5 INJECTION INTRAVENOUS at 19:35

## 2019-01-01 RX ADMIN — SODIUM CHLORIDE 1000 ML: 9 INJECTION, SOLUTION INTRAVENOUS at 10:47

## 2019-01-01 RX ADMIN — SODIUM CHLORIDE, PRESERVATIVE FREE 10 ML: 5 INJECTION INTRAVENOUS at 07:39

## 2019-01-01 RX ADMIN — HYDROMORPHONE HYDROCHLORIDE 0.5 MG: 1 INJECTION, SOLUTION INTRAMUSCULAR; INTRAVENOUS; SUBCUTANEOUS at 19:49

## 2019-01-01 RX ADMIN — HYDROMORPHONE HYDROCHLORIDE 0.5 MG: 1 INJECTION, SOLUTION INTRAMUSCULAR; INTRAVENOUS; SUBCUTANEOUS at 13:11

## 2019-01-01 RX ADMIN — MORPHINE SULFATE 1 MG: 2 INJECTION, SOLUTION INTRAMUSCULAR; INTRAVENOUS at 09:56

## 2019-01-01 RX ADMIN — LORAZEPAM 2 MG: 2 INJECTION INTRAMUSCULAR; INTRAVENOUS at 19:35

## 2019-01-01 RX ADMIN — PIPERACILLIN SODIUM,TAZOBACTAM SODIUM 3.38 G: 3; .375 INJECTION, POWDER, FOR SOLUTION INTRAVENOUS at 19:49

## 2019-01-01 RX ADMIN — MORPHINE SULFATE 1 MG: 2 INJECTION, SOLUTION INTRAMUSCULAR; INTRAVENOUS at 01:08

## 2019-01-01 RX ADMIN — SODIUM CHLORIDE, PRESERVATIVE FREE 10 ML: 5 INJECTION INTRAVENOUS at 23:14

## 2019-01-01 RX ADMIN — MORPHINE SULFATE 1 MG: 2 INJECTION, SOLUTION INTRAMUSCULAR; INTRAVENOUS at 09:34

## 2019-01-01 RX ADMIN — LORAZEPAM 2 MG: 2 INJECTION INTRAMUSCULAR; INTRAVENOUS at 10:52

## 2019-01-01 RX ADMIN — HYDROMORPHONE HYDROCHLORIDE 0.5 MG: 1 INJECTION, SOLUTION INTRAMUSCULAR; INTRAVENOUS; SUBCUTANEOUS at 18:30

## 2019-01-01 RX ADMIN — PIPERACILLIN SODIUM,TAZOBACTAM SODIUM 3.38 G: 3; .375 INJECTION, POWDER, FOR SOLUTION INTRAVENOUS at 03:45

## 2019-01-01 RX ADMIN — METHYLPREDNISOLONE SODIUM SUCCINATE 40 MG: 40 INJECTION, POWDER, FOR SOLUTION INTRAMUSCULAR; INTRAVENOUS at 16:52

## 2019-01-01 RX ADMIN — Medication: at 14:45

## 2019-01-01 RX ADMIN — SODIUM CHLORIDE, PRESERVATIVE FREE 10 ML: 5 INJECTION INTRAVENOUS at 04:49

## 2019-01-01 RX ADMIN — IPRATROPIUM BROMIDE AND ALBUTEROL SULFATE 3 ML: .5; 3 SOLUTION RESPIRATORY (INHALATION) at 14:23

## 2019-01-01 RX ADMIN — SODIUM CHLORIDE, PRESERVATIVE FREE 10 ML: 5 INJECTION INTRAVENOUS at 09:00

## 2019-01-01 RX ADMIN — IOVERSOL 100 ML: 741 INJECTION INTRA-ARTERIAL; INTRAVENOUS at 12:26

## 2019-01-01 RX ADMIN — Medication: at 23:21

## 2019-01-01 RX ADMIN — MORPHINE SULFATE 1 MG: 2 INJECTION, SOLUTION INTRAMUSCULAR; INTRAVENOUS at 18:59

## 2019-01-01 RX ADMIN — MORPHINE SULFATE 1 MG: 2 INJECTION, SOLUTION INTRAMUSCULAR; INTRAVENOUS at 23:13

## 2019-01-01 RX ADMIN — HYDROMORPHONE HYDROCHLORIDE 0.5 MG: 1 INJECTION, SOLUTION INTRAMUSCULAR; INTRAVENOUS; SUBCUTANEOUS at 07:39

## 2019-01-01 RX ADMIN — MORPHINE SULFATE 1 MG: 2 INJECTION, SOLUTION INTRAMUSCULAR; INTRAVENOUS at 13:46

## 2019-01-01 RX ADMIN — MORPHINE SULFATE 1 MG: 2 INJECTION, SOLUTION INTRAMUSCULAR; INTRAVENOUS at 04:49

## 2019-01-01 RX ADMIN — MINERAL OIL AND PETROLATUM: 150; 830 OINTMENT OPHTHALMIC at 20:09

## 2019-01-01 RX ADMIN — HYDROMORPHONE HYDROCHLORIDE 0.25 MG: 1 INJECTION, SOLUTION INTRAMUSCULAR; INTRAVENOUS; SUBCUTANEOUS at 22:25

## 2019-01-01 RX ADMIN — LORAZEPAM 1 MG: 2 INJECTION INTRAMUSCULAR; INTRAVENOUS at 02:45

## 2019-01-01 RX ADMIN — HYDROMORPHONE HYDROCHLORIDE 0.5 MG: 1 INJECTION, SOLUTION INTRAMUSCULAR; INTRAVENOUS; SUBCUTANEOUS at 15:10

## 2019-01-01 RX ADMIN — SODIUM CHLORIDE 1000 ML: 9 INJECTION, SOLUTION INTRAVENOUS at 13:11

## 2019-01-01 RX ADMIN — FUROSEMIDE 80 MG: 10 INJECTION, SOLUTION INTRAMUSCULAR; INTRAVENOUS at 19:48

## 2019-01-01 RX ADMIN — LORAZEPAM 2 MG: 2 INJECTION INTRAMUSCULAR; INTRAVENOUS at 03:49

## 2019-01-01 RX ADMIN — MORPHINE SULFATE 1 MG: 2 INJECTION, SOLUTION INTRAMUSCULAR; INTRAVENOUS at 07:44

## 2019-01-01 RX ADMIN — ACETAMINOPHEN 650 MG: 650 SUPPOSITORY RECTAL at 18:46

## 2019-01-01 RX ADMIN — HYDROMORPHONE HYDROCHLORIDE 0.25 MG: 1 INJECTION, SOLUTION INTRAMUSCULAR; INTRAVENOUS; SUBCUTANEOUS at 16:16

## 2019-01-01 RX ADMIN — MORPHINE SULFATE 1 MG: 2 INJECTION, SOLUTION INTRAMUSCULAR; INTRAVENOUS at 21:23

## 2019-01-01 RX ADMIN — MORPHINE SULFATE 1 MG: 2 INJECTION, SOLUTION INTRAMUSCULAR; INTRAVENOUS at 10:54

## 2019-01-01 RX ADMIN — SODIUM CHLORIDE, PRESERVATIVE FREE 10 ML: 5 INJECTION INTRAVENOUS at 07:45

## 2019-01-01 RX ADMIN — FUROSEMIDE 80 MG: 10 INJECTION, SOLUTION INTRAMUSCULAR; INTRAVENOUS at 17:00

## 2019-01-01 RX ADMIN — HYDROMORPHONE HYDROCHLORIDE 0.5 MG: 1 INJECTION, SOLUTION INTRAMUSCULAR; INTRAVENOUS; SUBCUTANEOUS at 09:42

## 2019-01-01 RX ADMIN — METHYLPREDNISOLONE SODIUM SUCCINATE 40 MG: 40 INJECTION, POWDER, FOR SOLUTION INTRAMUSCULAR; INTRAVENOUS at 17:00

## 2019-01-01 RX ADMIN — FUROSEMIDE 80 MG: 10 INJECTION, SOLUTION INTRAMUSCULAR; INTRAVENOUS at 16:52

## 2019-01-01 RX ADMIN — LORAZEPAM 2 MG: 2 INJECTION INTRAMUSCULAR; INTRAVENOUS at 23:13

## 2019-01-01 RX ADMIN — ACETAMINOPHEN 650 MG: 650 SUPPOSITORY RECTAL at 20:08

## 2019-01-01 RX ADMIN — ACETAMINOPHEN 650 MG: 650 SUPPOSITORY RECTAL at 17:43

## 2019-01-01 RX ADMIN — IPRATROPIUM BROMIDE AND ALBUTEROL SULFATE 3 ML: .5; 3 SOLUTION RESPIRATORY (INHALATION) at 07:44

## 2019-01-01 RX ADMIN — HYDROMORPHONE HYDROCHLORIDE 0.5 MG: 1 INJECTION, SOLUTION INTRAMUSCULAR; INTRAVENOUS; SUBCUTANEOUS at 12:07

## 2019-01-01 RX ADMIN — Medication: at 16:07

## 2019-01-01 RX ADMIN — LORAZEPAM 2 MG: 2 INJECTION INTRAMUSCULAR; INTRAVENOUS at 07:44

## 2019-01-01 RX ADMIN — SODIUM CHLORIDE, PRESERVATIVE FREE 10 ML: 5 INJECTION INTRAVENOUS at 19:48

## 2019-01-01 RX ADMIN — GLYCOPYRROLATE 0.4 MG: 0.2 INJECTION INTRAMUSCULAR; INTRAVENOUS at 03:49

## 2019-01-01 RX ADMIN — LORAZEPAM 1 MG: 2 INJECTION INTRAMUSCULAR; INTRAVENOUS at 17:05

## 2019-01-01 RX ADMIN — ACETYLCYSTEINE 2 ML: 200 SOLUTION ORAL; RESPIRATORY (INHALATION) at 14:55

## 2019-01-01 RX ADMIN — LORAZEPAM 2 MG: 2 INJECTION INTRAMUSCULAR; INTRAVENOUS at 14:41

## 2019-01-01 RX ADMIN — Medication 2 SPRAY: at 07:46

## 2019-01-01 RX ADMIN — SODIUM CHLORIDE, PRESERVATIVE FREE 10 ML: 5 INJECTION INTRAVENOUS at 16:16

## 2019-01-01 RX ADMIN — PIPERACILLIN SODIUM,TAZOBACTAM SODIUM 3.38 G: 3; .375 INJECTION, POWDER, FOR SOLUTION INTRAVENOUS at 16:52

## 2019-01-01 RX ADMIN — LORAZEPAM 2 MG: 2 INJECTION INTRAMUSCULAR; INTRAVENOUS at 11:46

## 2019-01-01 RX ADMIN — ACETAMINOPHEN 650 MG: 650 SUPPOSITORY RECTAL at 10:52

## 2019-01-01 RX ADMIN — IPRATROPIUM BROMIDE AND ALBUTEROL SULFATE 3 ML: .5; 3 SOLUTION RESPIRATORY (INHALATION) at 14:55

## 2019-01-01 RX ADMIN — CLINDAMYCIN PHOSPHATE 600 MG: 600 INJECTION, SOLUTION INTRAVENOUS at 10:41

## 2019-01-01 RX ADMIN — HYDROMORPHONE HYDROCHLORIDE 0.25 MG: 1 INJECTION, SOLUTION INTRAMUSCULAR; INTRAVENOUS; SUBCUTANEOUS at 13:17

## 2019-01-01 RX ADMIN — LORAZEPAM 2 MG: 2 INJECTION INTRAMUSCULAR; INTRAVENOUS at 17:43

## 2019-01-01 RX ADMIN — METHYLPREDNISOLONE SODIUM SUCCINATE 40 MG: 40 INJECTION, POWDER, FOR SOLUTION INTRAMUSCULAR; INTRAVENOUS at 03:45

## 2019-01-01 RX ADMIN — PIPERACILLIN SODIUM,TAZOBACTAM SODIUM 3.38 G: 3; .375 INJECTION, POWDER, FOR SOLUTION INTRAVENOUS at 10:22

## 2019-01-01 RX ADMIN — HYDROMORPHONE HYDROCHLORIDE 0.5 MG: 1 INJECTION, SOLUTION INTRAMUSCULAR; INTRAVENOUS; SUBCUTANEOUS at 13:54

## 2019-01-01 RX ADMIN — SODIUM CHLORIDE, PRESERVATIVE FREE 10 ML: 5 INJECTION INTRAVENOUS at 03:45

## 2019-01-01 RX ADMIN — ACETAMINOPHEN 650 MG: 650 SUPPOSITORY RECTAL at 23:41

## 2019-01-01 RX ADMIN — IPRATROPIUM BROMIDE AND ALBUTEROL SULFATE 3 ML: .5; 3 SOLUTION RESPIRATORY (INHALATION) at 01:14

## 2019-01-01 RX ADMIN — SODIUM CHLORIDE, PRESERVATIVE FREE 10 ML: 5 INJECTION INTRAVENOUS at 21:23

## 2019-01-01 RX ADMIN — SODIUM CHLORIDE, PRESERVATIVE FREE 10 ML: 5 INJECTION INTRAVENOUS at 19:49

## 2019-01-01 RX ADMIN — HYDROMORPHONE HYDROCHLORIDE 0.5 MG: 1 INJECTION, SOLUTION INTRAMUSCULAR; INTRAVENOUS; SUBCUTANEOUS at 18:41

## 2019-01-01 RX ADMIN — ACETAMINOPHEN 650 MG: 650 SUPPOSITORY RECTAL at 04:13

## 2019-01-01 RX ADMIN — SODIUM CHLORIDE, PRESERVATIVE FREE 10 ML: 5 INJECTION INTRAVENOUS at 01:09

## 2019-11-12 NOTE — ED PROVIDER NOTES
Subjective   Patient presents to [facility] with shortness of breath          Shortness of Breath   Severity:  Moderate  Onset quality:  Gradual  Chronicity:  Chronic  Context: activity    Relieved by:  Rest  Worsened by:  Exertion and movement  Associated symptoms: wheezing        Review of Systems   Constitutional: Positive for fatigue.   HENT: Negative.    Eyes: Negative.    Respiratory: Positive for shortness of breath and wheezing.    Cardiovascular: Negative.    Gastrointestinal: Negative.    Endocrine: Negative.    Genitourinary: Negative.    Musculoskeletal: Negative.    Allergic/Immunologic: Negative.    Neurological: Negative.    Hematological: Negative.    Psychiatric/Behavioral: Negative.        Past Medical History:   Diagnosis Date   • Alzheimer disease (CMS/HCC)    • Anxiety    • Dementia (CMS/HCC)    • Dermatopolymyositis (CMS/HCC)    • Dysphagia    • Hypertension    • Hypothyroid    • Malnutrition (CMS/HCC)    • Muscle spasm    • Quadriparesis (CMS/HCC)    • Vitamin D deficiency        Allergies   Allergen Reactions   • Aricept [Donepezil Hcl]        Past Surgical History:   Procedure Laterality Date   • ENDOSCOPY W/ PEG TUBE PLACEMENT N/A 1/17/2018    Procedure: ESOPHAGOGASTRODUODENOSCOPY WITH PERCUTANEOUS ENDOSCOPIC GASTROSTOMY TUBE INSERTION;  Surgeon: Wei Sinclair III, MD;  Location: [facility];  Service:        Family History   Family history unknown: Yes       Social History     Socioeconomic History   • Marital status:      Spouse name: Not on file   • Number of children: Not on file   • Years of education: Not on file   • Highest education level: Not on file   Tobacco Use   • Smoking status: Never Smoker   • Smokeless tobacco: Never Used   Substance and Sexual Activity   • Alcohol use: No   • Drug use: No   • Sexual activity: Defer           Objective   Physical Exam   Constitutional: She appears well-developed and well-nourished.   HENT:   Head: Normocephalic.   Mouth/Throat:  Oropharynx is clear and moist.   Eyes: Pupils are equal, round, and reactive to light.   Neck: Normal range of motion.   Cardiovascular: Normal rate.   Pulmonary/Chest: She is in respiratory distress. She has decreased breath sounds in the right lower field and the left lower field. She has wheezes in the right upper field and the left upper field.   Abdominal: Soft.   Musculoskeletal: Normal range of motion.        Right lower leg: Normal.        Left lower leg: Normal.   Neurological: She is alert.   Skin: Skin is warm. Capillary refill takes less than 2 seconds.   Psychiatric: She has a normal mood and affect.   Nursing note and vitals reviewed.      Procedures           ED Course  ED Course as of Nov 12 2236   Tue Nov 12, 2019   1713 CXR negative    [JOSE L]      ED Course User Index  [JOSE L] Paulie Rene MD                  [facility]    Final diagnoses:   Acute bacterial bronchitis              Paulie Rene MD  11/12/19 2235       Paulie Rene MD  11/12/19 2236

## 2019-12-15 PROBLEM — R06.03 RESPIRATORY DISTRESS: Status: ACTIVE | Noted: 2019-01-01

## 2019-12-15 PROBLEM — I50.31 DIASTOLIC CHF, ACUTE (HCC): Status: ACTIVE | Noted: 2019-01-01

## 2019-12-15 NOTE — ED PROVIDER NOTES
Subjective   64-year-old female with history of Alzheimer's dementia dysphagia hypertension quadriparesis presents to the emergency room in respiratory distress.  Per nursing home patient was found this morning noted to be in respiratory distress.  Patient does not require supplemental oxygen at nursing home.  Patient was placed on supplemental oxygen after noting to moderate respiratory distress.  Ox saturations were in the high 70s low 80s.  EMS was then contacted for transfer to the hospital.  In route patient was placed on supplemental oxygen as well as was given and was given DuoNeb in route.      Shortness of Breath   Severity:  Severe  Timing:  Constant  Progression:  Unchanged  Relieved by:  Nothing  Worsened by:  Nothing      Review of Systems   Unable to perform ROS: Dementia   Respiratory: Positive for shortness of breath.        Past Medical History:   Diagnosis Date   • Alzheimer disease (CMS/HCC)    • Anxiety    • Dementia (CMS/HCC)    • Dermatopolymyositis (CMS/HCC)    • Dysphagia    • Hypertension    • Hypothyroid    • Malnutrition (CMS/HCC)    • Muscle spasm    • Quadriparesis (CMS/HCC)    • Vitamin D deficiency        Allergies   Allergen Reactions   • Aricept [Donepezil Hcl]        Past Surgical History:   Procedure Laterality Date   • ENDOSCOPY W/ PEG TUBE PLACEMENT N/A 1/17/2018    Procedure: ESOPHAGOGASTRODUODENOSCOPY WITH PERCUTANEOUS ENDOSCOPIC GASTROSTOMY TUBE INSERTION;  Surgeon: Wei Sinclair III, MD;  Location: [facility] OR;  Service:        Family History   Family history unknown: Yes       Social History     Socioeconomic History   • Marital status:      Spouse name: Not on file   • Number of children: Not on file   • Years of education: Not on file   • Highest education level: Not on file   Tobacco Use   • Smoking status: Never Smoker   • Smokeless tobacco: Never Used   Substance and Sexual Activity   • Alcohol use: No   • Drug use: No   • Sexual activity: Defer            Objective   Physical Exam   Constitutional: She appears distressed.   Severe respiratory distress.  Agonal breathing.  Ill-appearing   HENT:   Dry mucous membranes.  Generalized pharyngeal erythema.  Edentulous.   Eyes: Pupils are equal, round, and reactive to light.   Neck: Neck supple.   Cardiovascular:   No murmur heard.  Tachycardic.  No murmur   Pulmonary/Chest: She is in respiratory distress.   Coarse rhonchi diffuse.  Accessory muscle use appreciated.   Abdominal: Soft. Bowel sounds are normal.   G-tube left upper quadrant.  No surrounding erythema or skin breakdown.   Musculoskeletal: She exhibits no edema or tenderness.   Plantar flexed feet bilaterally.  Contractures to feet.   Neurological:   Nonverbal.   Skin: There is pallor.   Diaphoretic   Nursing note and vitals reviewed.      Procedures           ED Course  ED Course as of Dec 15 1309   Sun Dec 15, 2019   0951 Patient is noted to be in severe respiratory distress upon initial presentation.  Patient with agonal breathing along with accessory muscle use.  Patient with coarse rhonchi.  Have concern for possible aspiration.  Have given Zosyn and clindamycin.  Patient to have albuterol neb as well as to be NT suction.  Have attempted to contact Amelia Clifton as well as Kym Beth patient's emergency contact listed on face sheet from [facility] however have been on successful in contacting them have left message on answering machine to contact the emergency room at earliest convenience.  Patient is noted to be a DNR we will continue to adhere to DNR wishes and still we are able to make contact with patient's emergency contacts.    [BB]   0957 Patient was given nasotracheal suction with improvement of respirations however patient still using accessory muscle use.  Patient is becoming less alert.    [BB]   1007 Have contacted daughter Amelia Clifton who states that patient is to remain a DNR.  Patient is not to be intubated as well.   She states she will come to the emergency room for further discussion of patient's healthcare.    [BB]   1038 Patient's family presents to bedside.  Have discussed care with family.  Patient continues to have increased work of breathing with accessory muscle use.    [BB]   1040 Patient flu negative    [BB]   1041 Patient's BNP and lactic acid unremarkable    [BB]   1047 Patient troponin negative x1 set.    [BB]   1128 Patient CT scan without contrast is noted to have no acute findings however patient does explain for aspiration we will continue IV antibiotics.Patient respiratory distress and patient's bedbound status will order CT scan of chest with PE protocol.    [BB]   1229 CT scan chest PE protocol shows no pulmonary pleasant.    [BB]   1234 In light of patient's respiratory status and concern for aspiration, have contacted hospitalist and awaiting call back    [BB]   1307 Have spoken to hospitalist who is agreed with patient hospital    [BB]      ED Course User Index  [BB] Erik Gilbert MD                      No data recorded                        MDM  Number of Diagnoses or Management Options  Aspiration pneumonia, unspecified aspiration pneumonia type, unspecified laterality, unspecified part of lung (CMS/HCC): new and requires workup  Respiratory distress: new and requires workup     Amount and/or Complexity of Data Reviewed  Clinical lab tests: reviewed and ordered  Tests in the radiology section of CPT®: reviewed and ordered  Discuss the patient with other providers: yes    Risk of Complications, Morbidity, and/or Mortality  Presenting problems: high  Diagnostic procedures: high  Management options: high    Patient Progress  Patient progress: stable      Final diagnoses:   Respiratory distress   Aspiration pneumonia, unspecified aspiration pneumonia type, unspecified laterality, unspecified part of lung (CMS/HCC)              Erik Gilbert MD  12/15/19 8312

## 2019-12-15 NOTE — H&P
[facility] Medicine Services  History & Physical    Patient Identification:  Name:  Baldo Eden  Age:  64 y.o.  Sex:  female  :  1955  MRN:  [id number]   Visit Number:  [id number]  Primary Care Physician:  Kashmir Garcia MD    I have seen the patient in conjunction with Alba Johnson PA-C and I agree with the following statements:    Subjective     Chief complaint: Sent from NH secondary to respiratory distress    History of presenting illness:      Baldo Eden is a 64 y.o. female with past medical history significant for Alzheimers disease, anxiety, dementia, quadriparesis, PEG placement, hypothyroidism, hypertension, and vitamin D deficiency.  She presented to the ED at [facility] for further evaluation of respiratory distress at her nursing home.  Upon arrival to the ED, vital signs were T 99.9, pulse 101, RR 55, and O2 of 91% on nasal cannula.  ABG revealed pH 7.507, pCO2 36.8, pO2 of 61.7, and oxygen saturation of 92.9 on 2L/min via NC.      Mrs. Eden is nonverbal with advanced dementia.  She has been a resident of [facility] for 5 years.  Her daughter Amelia is at bedside.  She reports concern her mother has aspirated.  She reports she has appeared more swollen recently.   She received tube feeds at the nursing home.      I did call [facility] and spoke with her nurse Cate.  She reports Ida is tube feeds only.  She had an episode around 2 weeks ago with respiratory distress and was diagnosed with bronchitis. She weighs around 165 more recently.  She has reportedly been swelling and had weight gain but they were concerned this was from her recent change in tube feeding.      Known Emergency Department medications received prior to my evaluation included Clindamycin, Zosyn, and 2L of NS. Emergency Department Room location at the time of my evaluation was ED 1 room 17 with daughter Amelia present in addition to grandson & granddaughter and  her daughter Amelia's father is also present.      ---------------------------------------------------------------------------------------------------------------------   Review of Systems   Unable to perform ROS: Patient nonverbal      ---------------------------------------------------------------------------------------------------------------------   Past Medical History:   Diagnosis Date   • Alzheimer disease (CMS/HCC)    • Anxiety    • Dementia (CMS/HCC)    • Dermatopolymyositis (CMS/HCC)    • Dysphagia    • Hypertension    • Hypothyroid    • Malnutrition (CMS/HCC)    • Muscle spasm    • Quadriparesis (CMS/HCC)    • Vitamin D deficiency      Past Surgical History:   Procedure Laterality Date   • ENDOSCOPY W/ PEG TUBE PLACEMENT N/A 1/17/2018    Procedure: ESOPHAGOGASTRODUODENOSCOPY WITH PERCUTANEOUS ENDOSCOPIC GASTROSTOMY TUBE INSERTION;  Surgeon: Wei Sinclair III, MD;  Location: [facility] OR;  Service:      Family History   Family history unknown: Yes     Social History     Socioeconomic History   • Marital status:      Spouse name: Not on file   • Number of children: Not on file   • Years of education: Not on file   • Highest education level: Not on file   Tobacco Use   • Smoking status: Never Smoker   • Smokeless tobacco: Never Used   Substance and Sexual Activity   • Alcohol use: No   • Drug use: No   • Sexual activity: Defer     ---------------------------------------------------------------------------------------------------------------------   Allergies:  Aricept [donepezil hcl]  ---------------------------------------------------------------------------------------------------------------------   Home medications:    Medications below are reported home medications pulling from within the system; at this time, these medications have not been reconciled unless otherwise specified and are in the verification process for further verifcation as current home medications.  Medications Prior to  Admission   Medication Sig Dispense Refill Last Dose   • acetaminophen (TYLENOL) 160 MG/5ML solution 20.3 mL by Per G Tube route Every 6 (Six) Hours As Needed for Mild Pain . 240 mL 0 Taking   • bacitracin 500 UNIT/GM ointment Apply  topically Every 12 (Twelve) Hours. 1 each 0 Taking   • baclofen (LIORESAL) 10 MG tablet Take 5 mg by mouth 2 (Two) Times a Day.   Taking   • cholecalciferol (VITAMIN D3) 1000 units tablet Take 1,000 Units by mouth Daily.   Taking   • diphenhydrAMINE (BENADRYL) 25 mg capsule Take 25 mg by mouth every night at bedtime.   Taking   • doxycycline (MONODOX) 100 MG capsule Take 1 capsule by mouth 2 (Two) Times a Day. 16 capsule 0    • levothyroxine (SYNTHROID, LEVOTHROID) 125 MCG tablet Take 125 mcg by mouth Daily.   Taking   • multivitamin (DAILY BLACK) tablet tablet Take 1 tablet by mouth Daily.   Taking   • ramipril (ALTACE) 5 MG capsule Take 5 mg by mouth Daily.   Taking   • senna (SENOKOT) 8.6 MG tablet tablet Take 1 tablet by mouth Daily.   Taking   • traMADol (ULTRAM) 50 MG tablet Take 50 mg by mouth 3 (Three) Times a Day.   Taking       Hospital Scheduled Meds:    furosemide 80 mg Intravenous Once          Current listed hospital scheduled medications may not yet reflect those currently placed in orders that are signed and held awaiting patient's arrival to floor.   ---------------------------------------------------------------------------------------------------------------------     Objective     Vital Signs:  Temp:  [98.6 °F (37 °C)-99.9 °F (37.7 °C)] 98.6 °F (37 °C)  Heart Rate:  [] 89  Resp:  [22-55] 26  BP: ()/(61-98) 124/79      12/15/19  0947   Weight: 74.8 kg (165 lb)     Body mass index is 27.46 kg/m².  ---------------------------------------------------------------------------------------------------------------------       Physical Exam   Constitutional: Vital signs are normal.   HENT:   Head: Normocephalic and atraumatic.   Eyes: Conjunctivae and lids are  normal.   Cardiovascular: Normal rate, regular rhythm, S1 normal and S2 normal.   Pulmonary/Chest: She is in respiratory distress. She has no decreased breath sounds. She has wheezes. She has rhonchi. She has rales.   Abdominal: Soft. Bowel sounds are normal.   G-tube present without significant erythema/drainage.     Musculoskeletal:   Foot contractures   Neurological:   Nonverbal.  Foot contractures.    Skin: Skin is warm and dry.   Psychiatric:   Unable to assess 2/2 nonverbal status         ---------------------------------------------------------------------------------------------------------------------  EKG:                  I have personally reviewed this EKG.  ---------------------------------------------------------------------------------------------------------------------   Results from last 7 days   Lab Units 12/15/19  1002   CRP mg/dL 9.28*   LACTATE mmol/L 1.9   WBC 10*3/mm3 10.94*   HEMOGLOBIN g/dL 14.5   HEMATOCRIT % 45.6   MCV fL 86.5   MCHC g/dL 31.8   PLATELETS 10*3/mm3 261   INR  0.98     Results from last 7 days   Lab Units 12/15/19  1002   PH, ARTERIAL pH units 7.507*   PO2 ART mm Hg 61.7*   PCO2, ARTERIAL mm Hg 36.8   HCO3 ART mmol/L 29.1*     Results from last 7 days   Lab Units 12/15/19  1002   SODIUM mmol/L 138   POTASSIUM mmol/L 4.0   MAGNESIUM mg/dL 2.1   CHLORIDE mmol/L 100   CO2 mmol/L 24.3   BUN mg/dL 13   CREATININE mg/dL 0.62   EGFR IF NONAFRICN AM mL/min/1.73 97   CALCIUM mg/dL 8.8   GLUCOSE mg/dL 179*   ALBUMIN g/dL 3.33*   BILIRUBIN mg/dL 0.5   ALK PHOS U/L 88   AST (SGOT) U/L 23   ALT (SGPT) U/L 24   Estimated Creatinine Clearance: 92.8 mL/min (by C-G formula based on SCr of 0.62 mg/dL).  No results found for: AMMONIA  Results from last 7 days   Lab Units 12/15/19  1002   TROPONIN T ng/mL <0.010     Results from last 7 days   Lab Units 12/15/19  1002   PROBNP pg/mL 471.9     No results found for: HGBA1C  Lab Results   Component Value Date    TSH 4.995 (H) 01/14/2018    FREET4  1.08 01/14/2018     No results found for: PREGTESTUR, PREGSERUM, HCG, HCGQUANT  Pain Management Panel     There is no flowsheet data to display.        No results found for: BLOODCX  No results found for: URINECX  No results found for: WOUNDCX  No results found for: STOOLCX      ---------------------------------------------------------------------------------------------------------------------  Imaging Results (Last 7 Days)     Procedure Component Value Units Date/Time    CT Chest Pulmonary Embolism [[id number]] Collected:  12/15/19 1225     Updated:  12/15/19 1227    Narrative:       EXAMINATION: CT CHEST PULMONARY EMBOLISM-      Technique: Multiple CT axial images were obtained through the level of  pulmonary arteries, following IV contrast administration per CT PE  protocol.  Volume Rendered 3D or MIP images performed.     Radiation dose reduction techniques were utilized per ALARA protocol.  Automated exposure control was initiated through either or [employer] or  [employer] software packages by  protocol.                CLINICAL INDICATION:    SOB and Chest Pain     COMPARISON:    None     FINDINGS:    There is bibasilar atalectasis.    There is no pulmonary artery filling defect to suggest pulmonary  embolism.   No pleural effusion.  There is no thoracic adenopathy.   Incidentally imaged upper abdomen is unremarkable.   Bone windows show no acute osseous abnormality.       Impression:          1. No PE.         This report was finalized on 12/15/2019 12:25 PM by Dr. Tommie Wan MD.       CT Chest Without Contrast [[id number]] Collected:  12/15/19 1118     Updated:  12/15/19 1121    Narrative:       CT CHEST WO CONTRAST-      TECHNIQUE: Multiple axial CT images were obtained from lung apex through  upper abdomen without administration of IV contrast. Reformatted images  in the coronal and/or sagittal plane(s) were generated from the axial  data set to facilitate diagnostic accuracy and/or surgical  planning.  Oral Contrast:NONE.     Radiation dose reduction techniques were utilized per ALARA protocol.  Automated exposure control was initiated through either or [employer] or  [employer] software packages by  protocol.             Clinical information  soa      Comparison  NONE.     Findings  LUNGS: BIBASILAR ATELECTASIS     HEART: Unremarkable.     PERICARDIUM: No effusion.     MEDIASTINUM: No masses. No enlarged lymph nodes.  No fluid collections.     PLEURA: No pleural effusion. No pleural mass or abnormal calcification.     MAJOR AIRWAYS: Clear. No intrinsic mass.     VASCULATURE: No evidence of aneurysm.     VISUALIZED UPPER ABDOMEN:        LIVER: Homogeneous. No focal hepatic mass or ductal dilatation.        SPLEEN: Homogeneous. No splenomegaly.        ADRENALS: No mass.        KIDNEYS: No mass. No obstructive uropathy.  No evidence of  urolithiasis.        GI TRACT: Non-dilated. No definite wall thickening.        PERITONEUM: No free air. No free fluid or loculated fluid  collections.           ABDOMINAL WALL: No focal hernia or mass.           OTHER: None.     BONES: No acute bony abnormality.       Impression:       Impression:  1. Unremarkable exam.  No source for the patient symptoms.  2. Other incidental/non-acute findings as above.     This report was finalized on 12/15/2019 11:19 AM by Dr. Tommie Wan MD.       XR Chest 1 View [[id number]] Collected:  12/15/19 1041     Updated:  12/15/19 1043    Narrative:       CR Chest 1 Vw    INDICATION:   Noticeable labored breathing and short of breath. Patient unable to communicate     COMPARISON:    Chest x-ray 11/12/2019    FINDINGS:  Single portable AP view(s) of the chest.  Cardiac silhouette size is normal. There is central vascular congestion and interstitial edema with patchy central to bibasilar airspace disease. There lung volumes are mildly diminished. No definite pleural  effusion. No pneumothorax.      Impression:       Findings are  most consistent with vascular congestion and interstitial edema with patchy pulmonary edema. There is however no evidence for cardiac silhouette enlargement. Please correlate for clinical evidence of volume overload. Infection felt less  likely, but considered given the lack of cardiac enlargement.    Signer Name: Gianna Caruso MD   Signed: 12/15/2019 10:41 AM   Workstation Name: [id number]    [facility]              I have personally reviewed the above radiology images and read the final radiology report on 12/15/19  ---------------------------------------------------------------------------------------------------------------------  Assessment / Plan     Active Hospital Problems    Diagnosis POA   • Respiratory distress [R06.03] Yes   • Diastolic CHF, acute (CMS/HCC) [I50.31] Unknown       ASSESSMENT/PLAN:  · Acute respiratory distress likely 2/2 acute CHF +/- aspiration pneumonia: Orders placed to titrate oxygen saturation greater than or equal to 90%.  Mrs. Eden's family does not wish for her to be intubated. We will treat aspiration as outlined.  Will move forward with diuresis  Respiratory PCR has been ordered.  MRSA screen is pending.   NT suctioning has been ordered PRN.      · Acute CHFexacerbation: Echocardiogram has been ordered.  Will obtain bedscale weight upon admission.  Per NH, her last weight there is 165.   In 2018, she weight close to 100 lbs and underwent tube feeding placement.   Blood pressures in ED improved to Dr. Eden's examination and 80mg of IV Lasix x1 was ordered to be followed by 80mg more in 6 hours at 2030.  Will closely monitor I/O.  ProBNP is not significantly elevated to my review.  I have ordered bed scale weight to be obtained upon entry to the floor.      · Sepsis with RR 55, , and T 99.9F with elevated C-RP 2/2 to possible aspiration event: NPO. Will assess tube feeding rates upon availability.  Per discussion with NH, she was at a rate of  50cc/hr.  IV Zosyn has been ordered.  Will repeat AM CXR given hydration in the ED to further assess for infiltrate.  Will add duoneb treatments.  CT chest per PE protocol without evidence of PE. Will add solumedrol 40mg BID to assist with inflammation in the event of underlying pneumonitis.  WBC still WNL.  C-RP is elevated.  UA with culture is pending at present.  CT chest without evidence of infiltrate thus far. Flu swab negative.  Respiratory PCR pending.     · Alzheimers: Supportive care.     · Bloody lung secretions:  Per Dr. Eden's examination, she did have some blood via NT suction.   Will monitor CBC and prophylax for DVTs with SCDs.  Dr. Eden discussed with daughter and she does not wish to move forward with bronchoscopy.      CODE STATUS:  Discussed in the ED with daughter Amelia who report her mother is DNR/DNI. She is aware that without chest compressions,defebrillation, emergency code drugs and/or intubation her mother could die.  She expresses that her mother has a very poor quality of life with progressive dementia.  She reports she is unsure regarding desire for pressor support and wishes to further discuss this with her sister Libby.   Per later discussion with Dr. Eden, the family did decide against pressor support but would like to try BiPAP.    ----------  -DVT prophylaxis: SCDs given blood secretions.   -Activity: Bedrest  -Expected length of stay: INPATIENT status due to the need for care which can only be reasonably provided in an hospital setting such as aggressive/expedited ancillary services and/or consultation services, the necessity for IV medications, close physician monitoring and/or the possible need for procedures.  In such, I feel patient’s risk for adverse outcomes and need for care warrant INPATIENT evaluation and predict the patient’s care encounter to likely last beyond 2 midnights.        High risk secondary to acute respiratory distress, advanced dementia, alzheimers,  possible aspiration event,     Alba Johnson PA-C  12/15/19  2:31 PM  Pager # [phone]  ---------------------------------------------------------------------------------------------------------------------

## 2019-12-15 NOTE — ED NOTES
Patient care handoff report called to [address] [place] [address].  SHOLA Brenner received report and verbalizes understanding.     Da Paulino RN  12/15/19 [id number]

## 2019-12-15 NOTE — ED NOTES
Patient was cleansed at this time and provided with clean brief, and linens. Patient tolerated well.      Lexii Ratliff RN  12/15/19 2095

## 2019-12-16 NOTE — NURSING NOTE
"Pt family was made aware that pt heart rate had elevated to the 140's, pt seems to be very anxious, moving around in the bed a lot, shows signs of discomfort. Pt family agreeable to addition of 1 mg morphine q 4hr and Ativan 1 mg q 4 hr. Pt family also asked to remove patient from bipap because the patient looks \"uncomfortable\". Pt was taken off the bipap and placed on 4.5 L nasal cannula. Pt currently has O2 sat of 94%. Will continue to monitor patient.   "

## 2019-12-16 NOTE — PROGRESS NOTES
Nutrition Services    Patient Name:  Baldo Eden  YOB: 1955  MRN: [id number]  Admit Date:  12/15/2019    RD consulted for TF recommendations, however noted PT's change of status to comfort measures. RD available as needed.    Electronically signed by:  Tami Delvalle RD  12/16/19 2:25 PM

## 2019-12-16 NOTE — CONSULTS
Kashmir Garcia MD  Consulting physician: Dr. James Eden  Reason for referral Hospice Referral discussion, comfort care   Chief Complaint   Patient presents with   • Respiratory Distress       HPI:  64 year old female patient presented to [facility] ED for symptoms of respiratory distress.  She is from local [facility], [place], where she has been for approx 5 years.  She was dx with advanced end stage Alzheimer's disease about 12-15 years ago.  She also has PMH of HTN, vitmain D def, anxiety, dementia, quardiparesis, PEG placement, and hypothyroidism. She is legally  and has 2 children, Libby and Amelia.  Patient was admitted for acute respiratory distress 2/2 acute CHF exacerbation +/- aspiration.  Family has made patient DNR/DNI and comfort measures, we have been consulted to continue GOC discussions and assist with comfort care.     Subjective:   Patient non-responsive in hospital bed.  Daughter, Libby and  and ALICIA at bedside.  Patient is tachypneic. Daughter tells me it was worse but that she just received a dose of morphine that has calmed her breathing some.     Advance care planning discussed: Yes  Code Status:   Current Code Status     Date Active Code Status Order ID Comments User Context       12/16/2019 0954 No CPR [id number]  James Eden MD Inpatient       Questions for Current Code Status     Question Answer Comment    Code Status No CPR     Medical Interventions (Level of Support Prior to Arrest) Comfort Measures     Level Of Support Discussed With Health Care Surrogate      Next of Kin (If No Surrogate)         Advance Directive: None  Surrogate decision maker:    Past Medical History:   Diagnosis Date   • Alzheimer disease (CMS/[facility])    • Anxiety    • Dementia (CMS/[facility])    • Dermatopolymyositis (CMS/[facility])    • Dysphagia    • Hypertension    • Hypothyroid    • Malnutrition (CMS/[facility])    • Muscle spasm    • Quadriparesis (CMS/[facility])    • Vitamin D deficiency      Past Surgical  History:   Procedure Laterality Date   • ENDOSCOPY W/ PEG TUBE PLACEMENT N/A 1/17/2018    Procedure: ESOPHAGOGASTRODUODENOSCOPY WITH PERCUTANEOUS ENDOSCOPIC GASTROSTOMY TUBE INSERTION;  Surgeon: Wei Sinclair III, MD;  Location: [facility] OR;  Service:        Reviewed current scheduled and prn medications for route, type, dose and frequency.    Current Facility-Administered Medications   Medication Dose Route Frequency Provider Last Rate Last Dose   • ipratropium-albuterol (DUO-NEB) nebulizer solution 3 mL  3 mL Nebulization Q6H - RT James Eden MD   3 mL at 12/16/19 1423   • levothyroxine (SYNTHROID, LEVOTHROID) tablet 150 mcg  150 mcg Oral Daily James Eden MD       • LORazepam (ATIVAN) injection 1 mg  1 mg Intravenous Q4H PRN Dhiraj Emanuel MD   1 mg at 12/16/19 1204   • methylPREDNISolone sodium succinate (SOLU-Medrol) injection 40 mg  40 mg Intravenous Q12H Alba Johnson PA-C   40 mg at 12/16/19 0345   • morphine injection 1 mg  1 mg Intravenous Q2H PRN James Eden MD   1 mg at 12/16/19 1346   • sodium chloride 0.9 % flush 10 mL  10 mL Intravenous PRN Erik Gilbert MD   10 mL at 12/16/19 0449   • sodium chloride 0.9 % flush 10 mL  10 mL Intravenous Q12H James Eden MD   10 mL at 12/16/19 0109        LORazepam  •  Morphine  •  sodium chloride  Allergies   Allergen Reactions   • Aricept [Donepezil Hcl]    • Influenza Vaccines Unknown - High Severity     Family History   Family history unknown: Yes     Social History     Socioeconomic History   • Marital status:      Spouse name: Not on file   • Number of children: Not on file   • Years of education: Not on file   • Highest education level: Not on file   Tobacco Use   • Smoking status: Never Smoker   • Smokeless tobacco: Never Used   Substance and Sexual Activity   • Alcohol use: No   • Drug use: No   • Sexual activity: Defer       Review of Systems - +/- per HPI and symptom review.      PPS: 20  /77 (BP  "Location: Right arm, Patient Position: Lying)   Pulse 89   Temp 97.8 °F (36.6 °C) (Axillary)   Resp (!) 54   Ht 165.1 cm (65\")   Wt 74.6 kg (164 lb 8 oz)   LMP  (LMP Unknown)   SpO2 96%   Breastfeeding No   BMI 27.37 kg/m²   74.6 kg (164 lb 8 oz) Body mass index is 27.37 kg/m².  Intake & Output (last day)       12/15 0701 - 12/16 0700 12/16 0701 - 12/17 0700    IV Piggyback 2150     Total Intake(mL/kg) 2150 (28.8)     Net +2150           Urine Unmeasured Occurrence 6 x 1 x          Physical Exam:  General Appearance: terminally ill, actively dying   Eyes: Conjunctivae and sclerae normal, no icterus, ANNABELLA- sluggish  Throat: No oral lesions, no thrush, oral mucosa dry she is mouth breathing   Lungs: Scattered crackles, respirations fast   Heart: Regular rhythm and normal rate, normal S1  Abdomen: Absent bowel sounds, soft, obese  Extremities: cool BLE, warm hands, bilateral foot drop severe   Pulses: Radial pulses felt and even   Skin: cool, pale, dry   Neurological: Non-responsive     Reviewed labs and diagnostic results.  No results found for: HGBA1C  Results from last 7 days   Lab Units 12/15/19  1002   WBC 10*3/mm3 10.94*   HEMOGLOBIN g/dL 14.5   HEMATOCRIT % 45.6   PLATELETS 10*3/mm3 261     Results from last 7 days   Lab Units 12/15/19  1002   SODIUM mmol/L 138   POTASSIUM mmol/L 4.0   CHLORIDE mmol/L 100   CO2 mmol/L 24.3   BUN mg/dL 13   CREATININE mg/dL 0.62   CALCIUM mg/dL 8.8   BILIRUBIN mg/dL 0.5   ALK PHOS U/L 88   ALT (SGPT) U/L 24   AST (SGOT) U/L 23   GLUCOSE mg/dL 179*     Results from last 7 days   Lab Units 12/15/19  1002   SODIUM mmol/L 138   POTASSIUM mmol/L 4.0   CHLORIDE mmol/L 100   CO2 mmol/L 24.3   BUN mg/dL 13   CREATININE mg/dL 0.62   GLUCOSE mg/dL 179*   CALCIUM mg/dL 8.8     Imaging Results (Last 72 Hours)     Procedure Component Value Units Date/Time    XR Chest 1 View [[id number]] Collected:  12/16/19 0757     Updated:  12/16/19 0800    Narrative:       XR CHEST 1 VW-   "   CLINICAL INDICATION: CHF, PNA; R06.03-Acute respiratory distress;  J69.0-Pneumonitis due to inhalation of food and vomit        COMPARISON: 12/15/2019      TECHNIQUE: Single frontal view of the chest.     FINDINGS:     Right basilar consolidation concerning for pneumonia  CHF  There is no evidence of an acute osseous abnormality.   There are no suspicious-appearing parenchymal soft tissue nodules.          Impression:       CHF and right basilar pneumonia     This report was finalized on 12/16/2019 7:57 AM by Dr. Bill Santillan MD.       CT Chest Pulmonary Embolism [[id number]] Collected:  12/15/19 1225     Updated:  12/15/19 1227    Narrative:       EXAMINATION: CT CHEST PULMONARY EMBOLISM-      Technique: Multiple CT axial images were obtained through the level of  pulmonary arteries, following IV contrast administration per CT PE  protocol.  Volume Rendered 3D or MIP images performed.     Radiation dose reduction techniques were utilized per ALARA protocol.  Automated exposure control was initiated through either or Hightower or  DoseRight software packages by  protocol.                CLINICAL INDICATION:    SOB and Chest Pain     COMPARISON:    None     FINDINGS:    There is bibasilar atalectasis.    There is no pulmonary artery filling defect to suggest pulmonary  embolism.   No pleural effusion.  There is no thoracic adenopathy.   Incidentally imaged upper abdomen is unremarkable.   Bone windows show no acute osseous abnormality.       Impression:          1. No PE.         This report was finalized on 12/15/2019 12:25 PM by Dr. Tommie Wan MD.       CT Chest Without Contrast [[id number]] Collected:  12/15/19 1118     Updated:  12/15/19 1121    Narrative:       CT CHEST WO CONTRAST-      TECHNIQUE: Multiple axial CT images were obtained from lung apex through  upper abdomen without administration of IV contrast. Reformatted images  in the coronal and/or sagittal plane(s) were generated from the  axial  data set to facilitate diagnostic accuracy and/or surgical planning.  Oral Contrast:NONE.     Radiation dose reduction techniques were utilized per ALARA protocol.  Automated exposure control was initiated through either or INTEX Program or  [employer] software packages by  protocol.             Clinical information  soa      Comparison  NONE.     Findings  LUNGS: BIBASILAR ATELECTASIS     HEART: Unremarkable.     PERICARDIUM: No effusion.     MEDIASTINUM: No masses. No enlarged lymph nodes.  No fluid collections.     PLEURA: No pleural effusion. No pleural mass or abnormal calcification.     MAJOR AIRWAYS: Clear. No intrinsic mass.     VASCULATURE: No evidence of aneurysm.     VISUALIZED UPPER ABDOMEN:        LIVER: Homogeneous. No focal hepatic mass or ductal dilatation.        SPLEEN: Homogeneous. No splenomegaly.        ADRENALS: No mass.        KIDNEYS: No mass. No obstructive uropathy.  No evidence of  urolithiasis.        GI TRACT: Non-dilated. No definite wall thickening.        PERITONEUM: No free air. No free fluid or loculated fluid  collections.           ABDOMINAL WALL: No focal hernia or mass.           OTHER: None.     BONES: No acute bony abnormality.       Impression:       Impression:  1. Unremarkable exam.  No source for the patient symptoms.  2. Other incidental/non-acute findings as above.     This report was finalized on 12/15/2019 11:19 AM by Dr. Tommie Wan MD.       XR Chest 1 View [[id number]] Collected:  12/15/19 1041     Updated:  12/15/19 1043    Narrative:       CR Chest 1 Vw    INDICATION:   Noticeable labored breathing and short of breath. Patient unable to communicate     COMPARISON:    Chest x-ray 11/12/2019    FINDINGS:  Single portable AP view(s) of the chest.  Cardiac silhouette size is normal. There is central vascular congestion and interstitial edema with patchy central to bibasilar airspace disease. There lung volumes are mildly diminished. No definite  pleural  effusion. No pneumothorax.      Impression:       Findings are most consistent with vascular congestion and interstitial edema with patchy pulmonary edema. There is however no evidence for cardiac silhouette enlargement. Please correlate for clinical evidence of volume overload. Infection felt less  likely, but considered given the lack of cardiac enlargement.    Signer Name: Gianna Caruso MD   Signed: 12/15/2019 10:41 AM   Workstation Name: [email or web address]    [facility]        Impression: 64 y.o. female admitted with acute respiratory distress 2/2 acute CHF exacerbation +/- aspiration.      Plan:     1.  Goals of Care   - Long discussion with  and daughter at bedside today.  They are very much comfort minded.  We discussed outlook and what to expect.  Patient appears to be actively dying in my opinion and appears slightly uncomfortable with tachypnea.  I discussed hospice care if patient even survives getting comfortable and family verbalizes understanding of hospice.  They are interested but unsure if she has payor source for nursing home and hospice care.  I have spoken to Carmina BLACK, but I highly expect patient to pass here at our facility based on exam today.  I have explained this to family.      2.  Tachypnea/dyspnea   - I have scheduled morphine 2mg every 6 hours to help patient with her breathing pattern.  I have also left the PRN dose of 1mg every 2 hours PRN from attending.      3.  Anxiety/agitation   - Ativan already onboard.  No changes made to dosing here.      4.  Dysphagia   - Patient has a PEG.  Daughter concerned bc dietary came to visit today and was talking about getting diet started back through her continuous feedings.  We discussed this is great detail and they do not want feedings resumed at this time, and I support their decision not to restart this.  Patient had no bowel sounds on exam today and I suspect this will cause more secretions and volume overload  than she will already develop.      5.  Oral care   - Dry mouth due to patient being mouth breather.  Oral care ordered but I also have ordered glycerin moisturizer for family to keep at bedside and help make patient appear more comfortable.      Please do not hesitate to call us regarding symptoms and comfort care.  We will continue to follow along with you and appreciate the consult.        Irena Sesay, JOANA  [phone]  12/16/19  2:31 PM      Time:75 minutes spent reviewing medical and medication records, assessing and examining patient, discussing with patient, family and nursing staff, Anaid, answering questions, formulating a plan and documentation of care. > 50% time spent face to face

## 2019-12-16 NOTE — PROGRESS NOTES
[facility] HOSPITALIST PROGRESS NOTE     Patient Identification:  Name:  Baldo Eden  Age:  64 y.o.  Sex:  female  :  1955  MRN:  [id number]  Visit Number:  [id number]  ROOM: [address] [place] [address]     Primary Care Provider:  Kashmir Garcia MD    Length of stay in inpatient status:  1    Subjective     Chief Compliant:    Chief Complaint   Patient presents with   • Respiratory Distress       History of Presenting Illness:    Patient started on PRN ativan and morphine overnight. I had a long goals of care discussion with patient's two daughters, who were bedside this morning. They decided to make patient comfort care. Stop life prolonging measures and just focus on comfort.     ROS:  Otherwise 10 point ROS negative other than documented above in HPI.     Objective     Current Hospital Meds:  furosemide 80 mg Intravenous Once   ipratropium-albuterol 3 mL Nebulization Q6H - RT   levothyroxine 150 mcg Oral Daily   methylPREDNISolone sodium succinate 40 mg Intravenous Q12H   Morphine 2 mg Intravenous Q6H   sodium chloride 10 mL Intravenous Q12H        Current Antimicrobial Therapy:  Anti-Infectives (From admission, onward)    Ordered     Dose/Rate Route Frequency Start Stop    12/15/19 0950  clindamycin (CLEOCIN) 600 mg in dextrose 5% 50 mL IVPB (premix)     Ordering Provider:  Erik Gilbert MD    600 mg  50 mL/hr over 60 Minutes Intravenous Once 12/15/19 1000 12/15/19 1110    12/15/19 0950  piperacillin-tazobactam (ZOSYN) 3.375 g/100 mL 0.9% NS IVPB (mbp)     Ordering Provider:  Erik Gilbert MD    3.375 g  over 30 Minutes Intravenous Once 12/15/19 0952 12/15/19 1045        Current Diuretic Therapy:  Diuretics (From admission, onward)    Ordered     Dose/Rate Route Frequency Start Stop    19 1655  furosemide (LASIX) injection 80 mg     Ordering Provider:  James Eden MD    80 mg Intravenous Once 19 1745      12/15/19 1442  furosemide (LASIX) injection 80 mg     Ordering  Provider:  Alba Johnson PA-C    80 mg Intravenous Once 12/15/19 2300 12/15/19 1948    12/15/19 1427  furosemide (LASIX) injection 80 mg     Ordering Provider:  Alba Johnson PA-C    80 mg Intravenous Once 12/15/19 1700 12/15/19 1652        ----------------------------------------------------------------------------------------------------------------------  Vital Signs:  Temp:  [97.8 °F (36.6 °C)-100.1 °F (37.8 °C)] 97.8 °F (36.6 °C)  Heart Rate:  [] 48  Resp:  [29-54] 30  BP: ()/(45-94) 103/58  SpO2:  [85 %-96 %] 95 %  on  Flow (L/min):  [4.5] 4.5;   Device (Oxygen Therapy): nasal cannula  Body mass index is 27.37 kg/m².    Wt Readings from Last 3 Encounters:   12/16/19 74.6 kg (164 lb 8 oz)   11/12/19 73 kg (161 lb)   09/13/18 49.6 kg (109 lb 6 oz)     Intake & Output (last 3 days)       12/13 0701 - 12/14 0700 12/14 0701 - 12/15 0700 12/15 0701 - 12/16 0700 12/16 0701 - 12/17 0700    IV Piggyback   2150     Total Intake(mL/kg)   2150 (28.8)     Net   +2150             Urine Unmeasured Occurrence   6 x 2 x        NPO Diet  ----------------------------------------------------------------------------------------------------------------------  Physical exam:  Gen: In mild distress on nasal canula   Head: Normocephalic and atraumatic.   Eyes: Conjunctivae and lids are normal.   Cardiovascular: Normal rate, regular rhythm, S1 normal and S2 normal.   Pulmonary/Chest: Slightly improved breath sounds, still course bilaterally.   Abdominal: Soft. Bowel sounds are normal. G-tube in place   Neuro: Nonverbal. Awake but not alert or oriented.   Skin: Diaphoretic. Warm. No rashes noted.   MSK: No red or swollen joints.   ----------------------------------------------------------------------------------------------------------------------  Tele:    ----------------------------------------------------------------------------------------------------------------------  Results from last 7 days   Lab  Units 12/15/19  1002   CRP mg/dL 9.28*   LACTATE mmol/L 1.9   WBC 10*3/mm3 10.94*   HEMOGLOBIN g/dL 14.5   HEMATOCRIT % 45.6   MCV fL 86.5   MCHC g/dL 31.8   PLATELETS 10*3/mm3 261   INR  0.98     Results from last 7 days   Lab Units 12/15/19  1002   PH, ARTERIAL pH units 7.507*   PO2 ART mm Hg 61.7*   PCO2, ARTERIAL mm Hg 36.8   HCO3 ART mmol/L 29.1*     Results from last 7 days   Lab Units 12/15/19  1002   SODIUM mmol/L 138   POTASSIUM mmol/L 4.0   MAGNESIUM mg/dL 2.1   CHLORIDE mmol/L 100   CO2 mmol/L 24.3   BUN mg/dL 13   CREATININE mg/dL 0.62   EGFR IF NONAFRICN AM mL/min/1.73 97   CALCIUM mg/dL 8.8   GLUCOSE mg/dL 179*   ALBUMIN g/dL 3.33*   BILIRUBIN mg/dL 0.5   ALK PHOS U/L 88   AST (SGOT) U/L 23   ALT (SGPT) U/L 24   Estimated Creatinine Clearance: 92.6 mL/min (by C-G formula based on SCr of 0.62 mg/dL).  No results found for: AMMONIA  Results from last 7 days   Lab Units 12/15/19  1002   TROPONIN T ng/mL <0.010     Results from last 7 days   Lab Units 12/15/19  1002   PROBNP pg/mL 471.9         No results found for: HGBA1C, POCGLU  Lab Results   Component Value Date    TSH 4.995 (H) 01/14/2018    FREET4 1.08 01/14/2018     No results found for: PREGTESTUR, PREGSERUM, HCG, HCGQUANT  Pain Management Panel     There is no flowsheet data to display.        Brief Urine Lab Results  (Last result in the past 365 days)      Color   Clarity   Blood   Leuk Est   Nitrite   Protein   CREAT   Urine HCG        12/15/19 1422 Yellow Turbid Moderate (2+) Large (3+) Negative Trace             Blood Culture   Date Value Ref Range Status   12/15/2019 No growth at 24 hours  Preliminary   12/15/2019 No growth at 24 hours  Preliminary     Urine Culture   Date Value Ref Range Status   12/15/2019 No growth  Preliminary     No results found for: WOUNDCX  No results found for: STOOLCX  No results found for: RESPCX  No results found for: AFBCX  Results from last 7 days   Lab Units 12/15/19  1046 12/15/19  1002   PROCALCITONIN ng/mL  0.07*  --    LACTATE mmol/L  --  1.9   CRP mg/dL  --  9.28*       I have personally looked at the labs and they are summarized above.  ----------------------------------------------------------------------------------------------------------------------  Detailed radiology reports for the last 24 hours:    Imaging Results (Last 24 Hours)     Procedure Component Value Units Date/Time    XR Chest 1 View [[id number]] Collected:  12/16/19 0757     Updated:  12/16/19 0800    Narrative:       XR CHEST 1 VW-     CLINICAL INDICATION: CHF, PNA; R06.03-Acute respiratory distress;  J69.0-Pneumonitis due to inhalation of food and vomit        COMPARISON: 12/15/2019      TECHNIQUE: Single frontal view of the chest.     FINDINGS:     Right basilar consolidation concerning for pneumonia  CHF  There is no evidence of an acute osseous abnormality.   There are no suspicious-appearing parenchymal soft tissue nodules.          Impression:       CHF and right basilar pneumonia     This report was finalized on 12/16/2019 7:57 AM by Dr. Bill Santillan MD.           Assessment & Plan    #Acute hypoxic respiratory failure   #SIRS  - Differential includes aspiration pneumonia vs. pneumonitis, pulmonary edema  - ProBNP normal, however, imaging more consistent with pulmonary edema   - CTPE did not reveal PE   - Repeat chest x-ray revealed RLL consolidation that likely represents aspiration pneumonia. Suspect the aspiration event was driving hypoxia.   - After multiple goals of care conversations, family decided to make patient comfort care on 12/16  - Patient still volume up on chest x-ray will diurese again with 80 IV lasix for comfort   - Will continue duonebs  - Will stop Zosyn as per family's wishes   - BiPAP stopped. Patient on NC for comfort.   - PRN morphine increased today to Q2hrs given continued air hunger and PRN ativan available Q4hrs. Will start morphine gtt if air hunger continues to be uncontrolled.   - Palliative care consulted      #End stage Alzheimer's disease   - Supportive care     #Hematemesis vs. Hemoptysis   - Possibly related to edema or pneumonitis   - Family not interested in endoscopic evaluations   - Will give SCDs for DVT prophylaxis     F: NPO, diuresing as above   E: Replace as needed   N: NPO    Code status: DNR/DNI. No pressors. Poor baseline quality of life.     Dispo: Comfort care. Palliative care consult pending. If patient stabilizes suspect she will go back to nursing home with hospice.        VTE Prophylaxis:   Mechanical Order History:      Ordered        12/15/19 1442  Place Sequential Compression Device  Once         12/15/19 1442  Maintain Sequential Compression Device  Continuous,   Status:  Canceled         12/15/19 1427  Place Sequential Compression Device  Once,   Status:  Canceled         12/15/19 1427  Maintain Sequential Compression Device  Continuous                 Pharmalogical Order History:     Ordered     Dose Route Frequency Stop    12/15/19 1442  heparin (porcine) 5000 UNIT/ML injection 5,000 Units  Status:  Discontinued      5,000 Units SC Every 8 Hours Scheduled 12/15/19 1519    12/15/19 1442  heparin (porcine) 5000 UNIT/ML injection 5,000 Units  Status:  Discontinued      5,000 Units SC Every 8 Hours Scheduled 12/15/19 1508    12/15/19 1442  heparin (porcine) 5000 UNIT/ML injection 5,000 Units  Status:  Discontinued      5,000 Units SC Every 8 Hours Scheduled 12/15/19 1508          James Eden MD  [facility]  12/16/19  4:56 PM

## 2019-12-16 NOTE — PROGRESS NOTES
Discharge Planning Assessment  Psychiatric     Patient Name: Baldo Eden  MRN: [id number]  Today's Date: 12/16/2019    Admit Date: 12/15/2019        Discharge Plan     Row Name 12/16/19 0920       Plan    Plan  Pt admitted on 12/15/19 from [facility].  Per [place] pt has a skilled bed reserved for 14 days at [facility] upon admit to [facility].  SS will follow.         NATA MccurdyW

## 2019-12-16 NOTE — PLAN OF CARE
Pt has shown a lot of anxiety and restlessness during the shift. Medications have been added to hopefully see improvement. Family has requested to take bipap off and use the nasal cannula. Currently O2 saturation is above 90%. Will continue to monitor.   Problem: Fall Risk (Adult)  Goal: Identify Related Risk Factors and Signs and Symptoms  Outcome: Ongoing (interventions implemented as appropriate)  Flowsheets (Taken 12/16/2019 0324)  Related Risk Factors (Fall Risk): bladder function altered; fatigue/slow reaction; gait/mobility problems; sensory deficits; sleep pattern alteration; slippery/uneven surfaces; environment unfamiliar; age-related changes  Signs and Symptoms (Fall Risk): presence of risk factors  Goal: Absence of Fall  Outcome: Ongoing (interventions implemented as appropriate)  Flowsheets (Taken 12/16/2019 0324)  Absence of Fall: making progress toward outcome     Problem: Patient Care Overview  Goal: Plan of Care Review  Outcome: Ongoing (interventions implemented as appropriate)  Flowsheets (Taken 12/16/2019 0324)  Progress: declining  Plan of Care Reviewed With: patient; family  Goal: Individualization and Mutuality  Outcome: Ongoing (interventions implemented as appropriate)  Goal: Discharge Needs Assessment  Outcome: Ongoing (interventions implemented as appropriate)  Goal: Interprofessional Rounds/Family Conf  Outcome: Ongoing (interventions implemented as appropriate)     Problem: Skin Injury Risk (Adult)  Goal: Identify Related Risk Factors and Signs and Symptoms  Outcome: Ongoing (interventions implemented as appropriate)  Flowsheets (Taken 12/16/2019 0324)  Related Risk Factors (Skin Injury Risk): advanced age; cognitive impairment; fluid intake inadequate; infection; medical devices; medication; mobility impaired; moisture; nutritional deficiencies; tissue perfusion altered  Goal: Skin Health and Integrity  Outcome: Ongoing (interventions implemented as appropriate)     Problem: Breathing  Pattern Ineffective (Adult)  Goal: Identify Related Risk Factors and Signs and Symptoms  Outcome: Ongoing (interventions implemented as appropriate)  Flowsheets (Taken 12/16/2019 0324)  Related Risk Factors (Breathing Pattern Ineffective): advanced age; deconditioning; fatigue; immobility; pain  Signs and Symptoms (Breathing Pattern Ineffective): accessory muscle use; activity intolerance; anxiousness; breathing pattern altered; cognition impaired; grunting; restlessness; retractions  Goal: Effective Oxygenation/Ventilation  Outcome: Ongoing (interventions implemented as appropriate)  Flowsheets (Taken 12/16/2019 0324)  Effective Oxygenation/Ventilation: making progress toward outcome  Goal: Anxiety/Fear Reduction  Outcome: Ongoing (interventions implemented as appropriate)

## 2019-12-16 NOTE — PAYOR COMM NOTE
"[facility]  NPI:[id number]    Utilization Review  Contact: Melvi Saavedra RN  Phone: [phone]  Fax:[phone]    INITIATE INPATIENT AUTHORIZATION      ICD: [id number]      Baldo Eden (64 y.o. Female)     Date of Birth Social Security Number Address Home Phone MRN    1955  [address] [facility] [address] [phone] [id number]    Yazidi Marital Status          Unknown        Admission Date Admission Type Admitting Provider Attending Provider Department, Room/Bed    12/15/19 Emergency James Eden MD Hacker, Bill J, MD [address], 3320/1P    Discharge Date Discharge Disposition Discharge Destination                       Attending Provider:  James Eden MD    Allergies:  Aricept [Donepezil Hcl], Influenza Vaccines    Isolation:  None   Infection:  None   Code Status:  No CPR    Ht:  165.1 cm (65\")   Wt:  74.6 kg (164 lb 8 oz)    Admission Cmt:  None   Principal Problem:  None                Active Insurance as of 12/15/2019     Primary Coverage     Payor Plan Insurance Group Employer/Plan Group    [facility] BL[employer]OSS [employer] EMPL[employer]EE [id number]     Payor Plan Address Payor Plan Phone Number Payor Plan Fax Number Effective Dates    [address] [phone]  1/1/2018 - None Entered    [facility] [id number]       Subscriber Name Subscriber Birth Date Member ID       BALDO EDEN 1955 [id number]           Secondary Coverage     Payor Plan Insurance Group Employer/Plan Group    [place]      Payor Plan Address Payor Plan Phone Number Payor Plan Fax Number Effective Dates    [address] [phone]  10/7/2016 - None Entered    [facility] [id number]       Subscriber Name Subscriber Birth Date Member ID       BALDO EDEN 1955 [id number]                 Emergency Contacts      (Rel.) Home Phone Work Phone Mobile Phone    Amelia Clifton (Daughter) -- -- [phone]    " Lbiby Beth (Daughter) -- -- [phone]               History & Physical      Alba Johnson PA-C at 12/15/19 1318     Attestation signed by James Eden MD at 12/15/19 4461    Ms. Eden is our 65 yo F with hx of end stage early onset Alzheimer's disease who presents from nursing home with respiratory distress and hypoxia.     Patient received 2L bolus in ED, Zosyn and clindamycin.     I had a long discussion with patient's POA and daughter who reported patient would not want any invasive measures done. She is DNR/DNI and also would not want pressors. She is ok with temporary BiPAP. I voiced my concern that patient may not survive this illness and daughter was understanding and reasonable regarding plan of care.     Exam:  Gen: In acute distress using assessory muscles to breath. 100 cc of red frothy liquid in suction basin.   Head: Normocephalic and atraumatic.   Eyes: Conjunctivae and lids are normal.   Cardiovascular: Normal rate, regular rhythm, S1 normal and S2 normal.   Pulmonary/Chest: Decreased course breath sounds bilaterally. No wheezing noted. In distress as above   Abdominal: Soft. Bowel sounds are normal. G-tube in place   Neuro: Nonverbal. Awake but not alert or oriented.   Skin: Diaphoretic. Warm. No rashes noted.   MSK: No red or swollen joints.     A/P:    #Acute hypoxic respiratory failure   #SIRS  - Differential includes aspiration pneumonia vs. pneumonitis, pulmonary edema  - ProBNP normal, however, imaging more consistent with pulmonary edema   - No consolidation on chest x-ray concerning for pneumonia but there could be a delay given recent aspiration.   - Likely exacerbated by fluid given in ED.   - Will diurese with 80 mg IV lasix X 2 and monitor response   - Procal pending, repeat chest x-ray ordered for in the morning   - Will continue Zosyn for the time being  - Will give mucomyst and encourage frequent respiratory suctioning   - Given substantial tachypnea and increased  WOB will place patient on BiPAP in an attempt to let her rest. If any sign of aspiration will stop.   - Will get echocardiogram     #End stage Alzheimer's disease   - Supportive care     #Hematemesis vs. Hemoptysis   - Possibly related to pulmonary source causing hypoxia   - Family not interested in endoscopic evaluations   - Will give SCDs for DVT prophylaxis   - Continue to trend H+H.     F: NPO, diuresing as above   E: Replace as needed   N: NPO    Code status: DNR/DNI. No pressors. Poor baseline quality of life.     Dispo: Admit to tele. Guarded prognosis. Will continued to have goals of care conversations with family pending clinical course. If patient does not improve will discuss potential comfort measures.                         [facility]  History & Physical    Patient Identification:  Name:  Baldo Eden  Age:  64 y.o.  Sex:  female  :  1955  MRN:  [id number]   Visit Number:  [id number]  Primary Care Physician:  Kashmir Garcia MD    I have seen the patient in conjunction with Alba Johnson PA-C and I agree with the following statements:    Subjective     Chief complaint: Sent from NH secondary to respiratory distress    History of presenting illness:      Baldo Eden is a 64 y.o. female with past medical history significant for Alzheimers disease, anxiety, dementia, quadriparesis, PEG placement, hypothyroidism, hypertension, and vitamin D deficiency.  She presented to the ED at [facility] for further evaluation of respiratory distress at her nursing home.  Upon arrival to the ED, vital signs were T 99.9, pulse 101, RR 55, and O2 of 91% on nasal cannula.  ABG revealed pH 7.507, pCO2 36.8, pO2 of 61.7, and oxygen saturation of 92.9 on 2L/min via NC.      Mrs. Eden is nonverbal with advanced dementia.  She has been a resident of [facility] for 5 years.  Her daughter Amelia is at bedside.  She reports concern her mother has aspirated.  She  reports she has appeared more swollen recently.   She received tube feeds at the nursing home.      I did call [facility] and spoke with her nurse Cate.  She reports Ida is tube feeds only.  She had an episode around 2 weeks ago with respiratory distress and was diagnosed with bronchitis. She weighs around 165 more recently.  She has reportedly been swelling and had weight gain but they were concerned this was from her recent change in tube feeding.      Known Emergency Department medications received prior to my evaluation included Clindamycin, Zosyn, and 2L of NS. Emergency Department Room location at the time of my evaluation was ED 1 room 17 with daughter Amelia present in addition to grandson & granddaughter and her daughter Amelia's father is also present.      ---------------------------------------------------------------------------------------------------------------------   Review of Systems   Unable to perform ROS: Patient nonverbal      ---------------------------------------------------------------------------------------------------------------------   Past Medical History:   Diagnosis Date   • Alzheimer disease (CMS/HCC)    • Anxiety    • Dementia (CMS/HCC)    • Dermatopolymyositis (CMS/HCC)    • Dysphagia    • Hypertension    • Hypothyroid    • Malnutrition (CMS/HCC)    • Muscle spasm    • Quadriparesis (CMS/HCC)    • Vitamin D deficiency      Past Surgical History:   Procedure Laterality Date   • ENDOSCOPY W/ PEG TUBE PLACEMENT N/A 1/17/2018    Procedure: ESOPHAGOGASTRODUODENOSCOPY WITH PERCUTANEOUS ENDOSCOPIC GASTROSTOMY TUBE INSERTION;  Surgeon: Wei Sinclair III, MD;  Location: [facility];  Service:      Family History   Family history unknown: Yes     Social History     Socioeconomic History   • Marital status:      Spouse name: Not on file   • Number of children: Not on file   • Years of education: Not on file   • Highest education level: Not on file   Tobacco Use    • Smoking status: Never Smoker   • Smokeless tobacco: Never Used   Substance and Sexual Activity   • Alcohol use: No   • Drug use: No   • Sexual activity: Defer     ---------------------------------------------------------------------------------------------------------------------   Allergies:  Aricept [donepezil hcl]  ---------------------------------------------------------------------------------------------------------------------   Home medications:    Medications below are reported home medications pulling from within the system; at this time, these medications have not been reconciled unless otherwise specified and are in the verification process for further verifcation as current home medications.  Medications Prior to Admission   Medication Sig Dispense Refill Last Dose   • acetaminophen (TYLENOL) 160 MG/5ML solution 20.3 mL by Per G Tube route Every 6 (Six) Hours As Needed for Mild Pain . 240 mL 0 Taking   • bacitracin 500 UNIT/GM ointment Apply  topically Every 12 (Twelve) Hours. 1 each 0 Taking   • baclofen (LIORESAL) 10 MG tablet Take 5 mg by mouth 2 (Two) Times a Day.   Taking   • cholecalciferol (VITAMIN D3) 1000 units tablet Take 1,000 Units by mouth Daily.   Taking   • diphenhydrAMINE (BENADRYL) 25 mg capsule Take 25 mg by mouth every night at bedtime.   Taking   • doxycycline (MONODOX) 100 MG capsule Take 1 capsule by mouth 2 (Two) Times a Day. 16 capsule 0    • levothyroxine (SYNTHROID, LEVOTHROID) 125 MCG tablet Take 125 mcg by mouth Daily.   Taking   • multivitamin (DAILY BLACK) tablet tablet Take 1 tablet by mouth Daily.   Taking   • ramipril (ALTACE) 5 MG capsule Take 5 mg by mouth Daily.   Taking   • senna (SENOKOT) 8.6 MG tablet tablet Take 1 tablet by mouth Daily.   Taking   • traMADol (ULTRAM) 50 MG tablet Take 50 mg by mouth 3 (Three) Times a Day.   Taking       Hospital Scheduled Meds:    furosemide 80 mg Intravenous Once          Current listed hospital scheduled medications may not  yet reflect those currently placed in orders that are signed and held awaiting patient's arrival to floor.   ---------------------------------------------------------------------------------------------------------------------     Objective     Vital Signs:  Temp:  [98.6 °F (37 °C)-99.9 °F (37.7 °C)] 98.6 °F (37 °C)  Heart Rate:  [] 89  Resp:  [22-55] 26  BP: ()/(61-98) 124/79      12/15/19  0947   Weight: 74.8 kg (165 lb)     Body mass index is 27.46 kg/m².  ---------------------------------------------------------------------------------------------------------------------       Physical Exam   Constitutional: Vital signs are normal.   HENT:   Head: Normocephalic and atraumatic.   Eyes: Conjunctivae and lids are normal.   Cardiovascular: Normal rate, regular rhythm, S1 normal and S2 normal.   Pulmonary/Chest: She is in respiratory distress. She has no decreased breath sounds. She has wheezes. She has rhonchi. She has rales.   Abdominal: Soft. Bowel sounds are normal.   G-tube present without significant erythema/drainage.     Musculoskeletal:   Foot contractures   Neurological:   Nonverbal.  Foot contractures.    Skin: Skin is warm and dry.   Psychiatric:   Unable to assess 2/2 nonverbal status         ---------------------------------------------------------------------------------------------------------------------  EKG:                  I have personally reviewed this EKG.  ---------------------------------------------------------------------------------------------------------------------   Results from last 7 days   Lab Units 12/15/19  1002   CRP mg/dL 9.28*   LACTATE mmol/L 1.9   WBC 10*3/mm3 10.94*   HEMOGLOBIN g/dL 14.5   HEMATOCRIT % 45.6   MCV fL 86.5   MCHC g/dL 31.8   PLATELETS 10*3/mm3 261   INR  0.98     Results from last 7 days   Lab Units 12/15/19  1002   PH, ARTERIAL pH units 7.507*   PO2 ART mm Hg 61.7*   PCO2, ARTERIAL mm Hg 36.8   HCO3 ART mmol/L 29.1*     Results from last 7 days    Lab Units 12/15/19  1002   SODIUM mmol/L 138   POTASSIUM mmol/L 4.0   MAGNESIUM mg/dL 2.1   CHLORIDE mmol/L 100   CO2 mmol/L 24.3   BUN mg/dL 13   CREATININE mg/dL 0.62   EGFR IF NONAFRICN AM mL/min/1.73 97   CALCIUM mg/dL 8.8   GLUCOSE mg/dL 179*   ALBUMIN g/dL 3.33*   BILIRUBIN mg/dL 0.5   ALK PHOS U/L 88   AST (SGOT) U/L 23   ALT (SGPT) U/L 24   Estimated Creatinine Clearance: 92.8 mL/min (by C-G formula based on SCr of 0.62 mg/dL).  No results found for: AMMONIA  Results from last 7 days   Lab Units 12/15/19  1002   TROPONIN T ng/mL <0.010     Results from last 7 days   Lab Units 12/15/19  1002   PROBNP pg/mL 471.9     No results found for: HGBA1C  Lab Results   Component Value Date    TSH 4.995 (H) 01/14/2018    FREET4 1.08 01/14/2018     No results found for: PREGTESTUR, PREGSERUM, HCG, HCGQUANT  Pain Management Panel     There is no flowsheet data to display.        No results found for: BLOODCX  No results found for: URINECX  No results found for: WOUNDCX  No results found for: STOOLCX      ---------------------------------------------------------------------------------------------------------------------  Imaging Results (Last 7 Days)     Procedure Component Value Units Date/Time    CT Chest Pulmonary Embolism [[id number]] Collected:  12/15/19 1225     Updated:  12/15/19 1227    Narrative:       EXAMINATION: CT CHEST PULMONARY EMBOLISM-      Technique: Multiple CT axial images were obtained through the level of  pulmonary arteries, following IV contrast administration per CT PE  protocol.  Volume Rendered 3D or MIP images performed.     Radiation dose reduction techniques were utilized per ALARA protocol.  Automated exposure control was initiated through either or [employer] or  [employer]Right software packages by  protocol.                CLINICAL INDICATION:    SOB and Chest Pain     COMPARISON:    None     FINDINGS:    There is bibasilar atalectasis.    There is no pulmonary artery filling defect  to suggest pulmonary  embolism.   No pleural effusion.  There is no thoracic adenopathy.   Incidentally imaged upper abdomen is unremarkable.   Bone windows show no acute osseous abnormality.       Impression:          1. No PE.         This report was finalized on 12/15/2019 12:25 PM by Dr. Tommie Wan MD.       CT Chest Without Contrast [[id number]] Collected:  12/15/19 1118     Updated:  12/15/19 1121    Narrative:       CT CHEST WO CONTRAST-      TECHNIQUE: Multiple axial CT images were obtained from lung apex through  upper abdomen without administration of IV contrast. Reformatted images  in the coronal and/or sagittal plane(s) were generated from the axial  data set to facilitate diagnostic accuracy and/or surgical planning.  Oral Contrast:NONE.     Radiation dose reduction techniques were utilized per ALARA protocol.  Automated exposure control was initiated through either or [employer] or  DoseRight software packages by  protocol.             Clinical information  soa      Comparison  NONE.     Findings  LUNGS: BIBASILAR ATELECTASIS     HEART: Unremarkable.     PERICARDIUM: No effusion.     MEDIASTINUM: No masses. No enlarged lymph nodes.  No fluid collections.     PLEURA: No pleural effusion. No pleural mass or abnormal calcification.     MAJOR AIRWAYS: Clear. No intrinsic mass.     VASCULATURE: No evidence of aneurysm.     VISUALIZED UPPER ABDOMEN:        LIVER: Homogeneous. No focal hepatic mass or ductal dilatation.        SPLEEN: Homogeneous. No splenomegaly.        ADRENALS: No mass.        KIDNEYS: No mass. No obstructive uropathy.  No evidence of  urolithiasis.        GI TRACT: Non-dilated. No definite wall thickening.        PERITONEUM: No free air. No free fluid or loculated fluid  collections.           ABDOMINAL WALL: No focal hernia or mass.           OTHER: None.     BONES: No acute bony abnormality.       Impression:       Impression:  1. Unremarkable exam.  No source for the  patient symptoms.  2. Other incidental/non-acute findings as above.     This report was finalized on 12/15/2019 11:19 AM by Dr. Tommie Wan MD.       XR Chest 1 View [[id number]] Collected:  12/15/19 1041     Updated:  12/15/19 1043    Narrative:       CR Chest 1 Vw    INDICATION:   Noticeable labored breathing and short of breath. Patient unable to communicate     COMPARISON:    Chest x-ray 11/12/2019    FINDINGS:  Single portable AP view(s) of the chest.  Cardiac silhouette size is normal. There is central vascular congestion and interstitial edema with patchy central to bibasilar airspace disease. There lung volumes are mildly diminished. No definite pleural  effusion. No pneumothorax.      Impression:       Findings are most consistent with vascular congestion and interstitial edema with patchy pulmonary edema. There is however no evidence for cardiac silhouette enlargement. Please correlate for clinical evidence of volume overload. Infection felt less  likely, but considered given the lack of cardiac enlargement.    Signer Name: Gianna Caruso MD   Signed: 12/15/2019 10:41 AM   Workstation Name: [email or web address]    [facility]              I have personally reviewed the above radiology images and read the final radiology report on 12/15/19  ---------------------------------------------------------------------------------------------------------------------  Assessment / Plan     Active Hospital Problems    Diagnosis POA   • Respiratory distress [R06.03] Yes   • Diastolic CHF, acute (CMS/HCC) [I50.31] Unknown       ASSESSMENT/PLAN:  · Acute respiratory distress likely 2/2 acute CHF +/- aspiration pneumonia: Orders placed to titrate oxygen saturation greater than or equal to 90%.  Mrs. Eden's family does not wish for her to be intubated. We will treat aspiration as outlined.  Will move forward with diuresis  Respiratory PCR has been ordered.  MRSA screen is pending.   NT suctioning has been  ordered PRN.      · Acute CHFexacerbation: Echocardiogram has been ordered.  Will obtain bedscale weight upon admission.  Per NH, her last weight there is 165.   In 2018, she weight close to 100 lbs and underwent tube feeding placement.   Blood pressures in ED improved to Dr. Eden's examination and 80mg of IV Lasix x1 was ordered to be followed by 80mg more in 6 hours at 2030.  Will closely monitor I/O.  ProBNP is not significantly elevated to my review.  I have ordered bed scale weight to be obtained upon entry to the floor.      · Sepsis with RR 55, , and T 99.9F with elevated C-RP 2/2 to possible aspiration event: NPO. Will assess tube feeding rates upon availability.  Per discussion with NH, she was at a rate of 50cc/hr.  IV Zosyn has been ordered.  Will repeat AM CXR given hydration in the ED to further assess for infiltrate.  Will add duoneb treatments.  CT chest per PE protocol without evidence of PE. Will add solumedrol 40mg BID to assist with inflammation in the event of underlying pneumonitis.  WBC still WNL.  C-RP is elevated.  UA with culture is pending at present.  CT chest without evidence of infiltrate thus far. Flu swab negative.  Respiratory PCR pending.     · Alzheimers: Supportive care.     · Bloody lung secretions:  Per Dr. Eden's examination, she did have some blood via NT suction.   Will monitor CBC and prophylax for DVTs with SCDs.  Dr. Eden discussed with daughter and she does not wish to move forward with bronchoscopy.      CODE STATUS:  Discussed in the ED with daughter Amelia who report her mother is DNR/DNI. She is aware that without chest compressions,defebrillation, emergency code drugs and/or intubation her mother could die.  She expresses that her mother has a very poor quality of life with progressive dementia.  She reports she is unsure regarding desire for pressor support and wishes to further discuss this with her sister Libby.   Per later discussion with Dr. Eden,  the family did decide against pressor support but would like to try BiPAP.    ----------  -DVT prophylaxis: SCDs given blood secretions.   -Activity: Bedrest  -Expected length of stay: INPATIENT status due to the need for care which can only be reasonably provided in an hospital setting such as aggressive/expedited ancillary services and/or consultation services, the necessity for IV medications, close physician monitoring and/or the possible need for procedures.  In such, I feel patient’s risk for adverse outcomes and need for care warrant INPATIENT evaluation and predict the patient’s care encounter to likely last beyond 2 midnights.        High risk secondary to acute respiratory distress, advanced dementia, alzheimers, possible aspiration event,     Alba Johnson PA-C  12/15/19  2:31 PM  Pager # [phone]  ---------------------------------------------------------------------------------------------------------------------             Electronically signed by aJmes Eden MD at 12/15/19 1652          Emergency Department Notes      Erik Gilbert MD at 12/15/19 2926          Subjective   64-year-old female with history of Alzheimer's dementia dysphagia hypertension quadriparesis presents to the emergency room in respiratory distress.  Per nursing home patient was found this morning noted to be in respiratory distress.  Patient does not require supplemental oxygen at nursing home.  Patient was placed on supplemental oxygen after noting to moderate respiratory distress.  Ox saturations were in the high 70s low 80s.  EMS was then contacted for transfer to the hospital.  In route patient was placed on supplemental oxygen as well as was given and was given DuoNeb in route.      Shortness of Breath   Severity:  Severe  Timing:  Constant  Progression:  Unchanged  Relieved by:  Nothing  Worsened by:  Nothing      Review of Systems   Unable to perform ROS: Dementia   Respiratory: Positive for shortness  of breath.        Past Medical History:   Diagnosis Date   • Alzheimer disease (CMS/HCC)    • Anxiety    • Dementia (CMS/HCC)    • Dermatopolymyositis (CMS/HCC)    • Dysphagia    • Hypertension    • Hypothyroid    • Malnutrition (CMS/HCC)    • Muscle spasm    • Quadriparesis (CMS/HCC)    • Vitamin D deficiency        Allergies   Allergen Reactions   • Aricept [Donepezil Hcl]        Past Surgical History:   Procedure Laterality Date   • ENDOSCOPY W/ PEG TUBE PLACEMENT N/A 1/17/2018    Procedure: ESOPHAGOGASTRODUODENOSCOPY WITH PERCUTANEOUS ENDOSCOPIC GASTROSTOMY TUBE INSERTION;  Surgeon: Wei Sinclair III, MD;  Location: [facility];  Service:        Family History   Family history unknown: Yes       Social History     Socioeconomic History   • Marital status:      Spouse name: Not on file   • Number of children: Not on file   • Years of education: Not on file   • Highest education level: Not on file   Tobacco Use   • Smoking status: Never Smoker   • Smokeless tobacco: Never Used   Substance and Sexual Activity   • Alcohol use: No   • Drug use: No   • Sexual activity: Defer           Objective   Physical Exam   Constitutional: She appears distressed.   Severe respiratory distress.  Agonal breathing.  Ill-appearing   HENT:   Dry mucous membranes.  Generalized pharyngeal erythema.  Edentulous.   Eyes: Pupils are equal, round, and reactive to light.   Neck: Neck supple.   Cardiovascular:   No murmur heard.  Tachycardic.  No murmur   Pulmonary/Chest: She is in respiratory distress.   Coarse rhonchi diffuse.  Accessory muscle use appreciated.   Abdominal: Soft. Bowel sounds are normal.   G-tube left upper quadrant.  No surrounding erythema or skin breakdown.   Musculoskeletal: She exhibits no edema or tenderness.   Plantar flexed feet bilaterally.  Contractures to feet.   Neurological:   Nonverbal.   Skin: There is pallor.   Diaphoretic   Nursing note and vitals reviewed.      Procedures          ED Course  ED  Course as of Dec 15 1309   Sun Dec 15, 2019   0951 Patient is noted to be in severe respiratory distress upon initial presentation.  Patient with agonal breathing along with accessory muscle use.  Patient with coarse rhonchi.  Have concern for possible aspiration.  Have given Zosyn and clindamycin.  Patient to have albuterol neb as well as to be NT suction.  Have attempted to contact Amelia Clifton as well as Kymsaritha Beth patient's emergency contact listed on face sheet from nursing home however have been on successful in contacting them have left message on answering machine to contact the emergency room at earliest convenience.  Patient is noted to be a DNR we will continue to adhere to DNR wishes and still we are able to make contact with patient's emergency contacts.    [BB]   0957 Patient was given nasotracheal suction with improvement of respirations however patient still using accessory muscle use.  Patient is becoming less alert.    [BB]   1007 Have contacted daughter Amelia Clifton who states that patient is to remain a DNR.  Patient is not to be intubated as well.  She states she will come to the emergency room for further discussion of patient's healthcare.    [BB]   1038 Patient's family presents to bedside.  Have discussed care with family.  Patient continues to have increased work of breathing with accessory muscle use.    [BB]   1040 Patient flu negative    [BB]   1041 Patient's BNP and lactic acid unremarkable    [BB]   1047 Patient troponin negative x1 set.    [BB]   1128 Patient CT scan without contrast is noted to have no acute findings however patient does explain for aspiration we will continue IV antibiotics.Patient respiratory distress and patient's bedbound status will order CT scan of chest with PE protocol.    [BB]   1229 CT scan chest PE protocol shows no pulmonary pleasant.    [BB]   1234 In light of patient's respiratory status and concern for aspiration, have contacted  hospitalist and awaiting call back    [BB]   1307 Have spoken to hospitalist who is agreed with patient hospital    [BB]      ED Course User Index  [BB] Erik Gilbert MD                      No data recorded                        MDM  Number of Diagnoses or Management Options  Aspiration pneumonia, unspecified aspiration pneumonia type, unspecified laterality, unspecified part of lung (CMS/HCC): new and requires workup  Respiratory distress: new and requires workup     Amount and/or Complexity of Data Reviewed  Clinical lab tests: reviewed and ordered  Tests in the radiology section of CPT®:  reviewed and ordered  Discuss the patient with other providers: yes    Risk of Complications, Morbidity, and/or Mortality  Presenting problems: high  Diagnostic procedures: high  Management options: high    Patient Progress  Patient progress: stable      Final diagnoses:   Respiratory distress   Aspiration pneumonia, unspecified aspiration pneumonia type, unspecified laterality, unspecified part of lung (CMS/HCC)              Erik Gilbert MD  12/15/19 1309      Electronically signed by Erik Gilbert MD at 12/15/19 1309     Matilda Steel at 12/15/19 1009        ekg done and shown to dr gilbert @0948       Matilda Steel  12/15/19 1009      Electronically signed by Matilda Steel at 12/15/19 1009     Matilda Steel at 12/15/19 1009        Patient placed on all monitors.     Matilda Steel  12/15/19 1010      Electronically signed by Matilda Steel at 12/15/19 1010     Da Paulino, RN at 12/15/19 1410        Patient care handoff report called to [address] [place] [address].  SHOLA Brenner received report and verbalizes understanding.     Da Paulino, RN  12/15/19 1410      Electronically signed by Da Paulino, RN at 12/15/19 1410     Lexii Ratliff, SHOLA at 12/15/19 1424        Patient was cleansed at this time and provided with clean brief, and  linens. Patient tolerated well.      Lexii Ratliff, RN  12/15/19 1426      Electronically signed by Lexii Ratliff, RN at 12/15/19 1426       Vital Signs (last day)     Date/Time   Temp   Temp src   Pulse   Resp   BP   Patient Position   SpO2    12/16/19 0740   98.3 (36.8)   Axillary   99   (!) 34   91/45   Lying   94    12/16/19 0253   --   --   120   --   --   --   93    12/16/19 0246   100.1 (37.8)   Axillary   117   (!) 39   150/94   --   95    12/16/19 0135   --   --   114   (!) 39   --   --   96    12/16/19 0114   --   --   108   (!) 40   --   --   94    12/15/19 2221   --   --   88   --   --   --   95    12/15/19 2213   --   --   --   --   --   --   90    12/15/19 2209   --   --   91   (!) 32   --   --   (!) 85    12/15/19 1858   --   --   92   (!) 29   --   --   96    12/15/19 1838   --   --   95   (!) 32   --   --   95    12/15/19 1819   98.1 (36.7)   Oral   98   (!) 36   140/91   --   95    12/15/19 1444   98.3 (36.8)   Oral   81   24   139/59   --   94    12/15/19 1433   --   --   89   26   --   --   95    12/15/19 1421   --   --   89   --   124/79   --   --    12/15/19 1420   --   --   94   --   --   --   --    12/15/19 1419   --   --   89   --   --   --   99    12/15/19 1418   --   --   90   --   132/96   --   96    12/15/19 1417   --   --   91   --   --   --   93    12/15/19 1416   --   --   90   --   127/79   --   --    12/15/19 1415   --   --   --   --   --   --   (!) 88    12/15/19 1414   98.6 (37)   Oral   90   26   132/76   Lying   95    12/15/19 1413   --   --   88   --   --   --   95    12/15/19 1412   --   --   87   --   132/76   --   98    12/15/19 1408   --   --   90   --   126/98   --   92    12/15/19 1407   --   --   88   --   --   --   96    12/15/19 1406   --   --   89   --   120/84   --   94    12/15/19 1403   --   --   85   --   119/74   --   97    12/15/19 1359   --   --   88   --   128/72   --   96    12/15/19 1357   --   --   85   --   126/76   --   96     12/15/19 1354   --   --   86   --   111/74   --   94    12/15/19 1351   --   --   88   --   124/71   --   94    12/15/19 13:48:50   98.6 (37)   Axillary   91   22   135/86   Lying   96    12/15/19 1348   --   --   96   --   135/86   --   95    12/15/19 1342   --   --   82   --   108/61   --   95    12/15/19 1330   --   --   87   --   99/63   --   91    12/15/19 1321   --   --   92   --   104/79   --   (!) 86    12/15/19 1306   --   --   92   --   105/72   --   (!) 89    12/15/19 1300   --   --   89   --   103/66   --   90    12/15/19 1251   --   --   85   --   104/62   --   93    12/15/19 1242   --   --   89   --   101/69   --   95    12/15/19 1235   --   --   91   --   109/65   --   92    12/15/19 1203   --   --   96   --   114/76   --   92    12/15/19 1135   --   --   101   --   --   --   (!) 88    12/15/19 1005   --   --   109   26   --   --   100    12/15/19 0951   --   --   101   --   --   --   (!) 89    12/15/19 0947   99.9 (37.7)   Axillary   101   (!) 55   126/70   Lying   91              Hospital Medications (all)       Dose Frequency Start End    acetylcysteine (MUCOMYST) 20 % solution 2 mL (Completed) 2 mL Once 12/15/2019 12/15/2019    Admin Instructions: IF GIVING ORALLY: Dilute to a 5% solution in a cup over ice in a clear carbonated beverage with a straw. Use within 1 hour of mixing.    Route: Inhalation    clindamycin (CLEOCIN) 600 mg in dextrose 5% 50 mL IVPB (premix) (Completed) 600 mg Once 12/15/2019 12/15/2019    Admin Instructions: Do Not refrigerate.    Route: Intravenous    furosemide (LASIX) injection 80 mg (Completed) 80 mg Once 12/15/2019 12/15/2019    Route: Intravenous    Cosign for Ordering: Accepted by James Eden MD on 12/15/2019  2:51 PM    furosemide (LASIX) injection 80 mg (Completed) 80 mg Once 12/15/2019 12/15/2019    Route: Intravenous    Cosign for Ordering: Accepted by James Eden MD on 12/15/2019  2:51 PM    Ioversol (OPTIRAY 350) injection 100 mL (Completed) 100 mL  Once in Imaging 12/15/2019 12/15/2019    Route: Intravenous    ipratropium-albuterol (DUO-NEB) nebulizer solution 3 mL 3 mL Every 6 Hours - RT 12/15/2019     Route: Nebulization    levothyroxine (SYNTHROID, LEVOTHROID) tablet 150 mcg 150 mcg Daily 12/16/2019     Admin Instructions: Take on empty stomach.    Route: Oral    LORazepam (ATIVAN) injection 1 mg 1 mg Every 4 Hours PRN 12/16/2019 12/26/2019    Admin Instructions:  Caution: Look alike/sound alike drug alert    Route: Intravenous    methylPREDNISolone sodium succinate (SOLU-Medrol) injection 40 mg 40 mg Every 12 Hours 12/15/2019     Admin Instructions: Caution: Look alike/sound alike drug alert    Route: Intravenous    Cosign for Ordering: Accepted by James Eden MD on 12/15/2019  2:51 PM    morphine injection 1 mg 1 mg Every 4 Hours PRN 12/16/2019 12/26/2019    Admin Instructions: If given for pain, use the following pain scale:<BR>Mild Pain = Pain Score of 1-3, CPOT 1-2<BR>Moderate Pain = Pain Score of 4-6, CPOT 3-4<BR>Severe Pain = Pain Score of 7-10, CPOT 5-8    Route: Intravenous    piperacillin-tazobactam (ZOSYN) 3.375 g/100 mL 0.9% NS IVPB (mbp) (Completed) 3.375 g Once 12/15/2019 12/15/2019    Admin Instructions: Break seal and mix to activate vial before use    Route: Intravenous    sodium chloride 0.9 % bolus 1,000 mL (Completed) 1,000 mL Once 12/15/2019 12/15/2019    Route: Intravenous    sodium chloride 0.9 % bolus 1,000 mL (Completed) 1,000 mL Once 12/15/2019 12/15/2019    Route: Intravenous    sodium chloride 0.9 % flush 10 mL 10 mL As Needed 12/15/2019     Route: Intravenous    sodium chloride 0.9 % flush 10 mL 10 mL Every 12 Hours Scheduled 12/15/2019     Route: Intravenous    cholecalciferol (VITAMIN D3) tablet 1,000 Units (Discontinued) 1,000 Units Daily 12/16/2019 12/16/2019    Route: Oral    heparin (porcine) 5000 UNIT/ML injection 5,000 Units (Discontinued) 5,000 Units Every 8 Hours Scheduled 12/15/2019 12/15/2019    Route:  Subcutaneous    heparin (porcine) 5000 UNIT/ML injection 5,000 Units (Discontinued) 5,000 Units Every 8 Hours Scheduled 12/15/2019 12/15/2019    Route: Subcutaneous    Reason for Discontinue: Duplicate order    heparin (porcine) 5000 UNIT/ML injection 5,000 Units (Discontinued) 5,000 Units Every 8 Hours Scheduled 12/15/2019 12/15/2019    Route: Subcutaneous    Reason for Discontinue: Duplicate order    lactobacillus acidophilus (RISAQUAD) capsule 1 capsule (Discontinued) 1 capsule Daily 12/16/2019 12/16/2019    Route: Oral    multivitamin (DAILY BLACK) tablet 1 tablet (Discontinued) 1 tablet Daily 12/16/2019 12/16/2019    Route: Oral    piperacillin-tazobactam (ZOSYN) 3.375 g/100 mL 0.9% NS IVPB (mbp) (Discontinued) 3.375 g Once 12/15/2019 12/15/2019    Admin Instructions: Break seal and mix to activate vial before use    Route: Intravenous    Reason for Discontinue: Duplicate order    piperacillin-tazobactam (ZOSYN) 3.375 g/100 mL 0.9% NS IVPB (mbp) (Discontinued) 3.375 g Every 6 Hours 12/15/2019 12/16/2019    Admin Instructions: Break seal and mix to activate vial before use    Route: Intravenous    senna (SENOKOT) tablet 1 tablet (Discontinued) 1 tablet Daily 12/16/2019 12/16/2019    Route: Oral    sodium chloride 0.9 % flush 10 mL (Discontinued) 10 mL As Needed 12/15/2019 12/15/2019    Route: Intravenous    Reason for Discontinue: Duplicate order          Physician Progress Notes (all)    No notes of this type exist for this encounter.         Consult Notes (all)    No notes of this type exist for this encounter.

## 2019-12-17 NOTE — PROGRESS NOTES
"Palliative Care Daily Progress Note     S: Medical record reviewed, followed up with 2 dtrs at bedside, RN regarding patient's condition. Events noted. Pt was comfortable overnight and had a few doses of scheduled morphine held, but continued to receive PRNs regularly. Total of 11 doses given yesterday and early today. PCP had been contacted about increasing RR, flushing, and changed to PRN plus scheduled dilaudid. Pt still having some respiratory distress upon entering room. Given another dose of PRN dilaudid at my instruction after about 45 mins from previous dose, with some decrease in RR but air hunger still present. Family otherwise denies any concerns, and continue to push for her being comfortable.       O:   Palliative Performance Scale Score:     /71 (BP Location: Right arm, Patient Position: Lying)   Pulse 102   Temp 98.5 °F (36.9 °C) (Axillary)   Resp (!) 29   Ht 165.1 cm (65\")   Wt 74.8 kg (165 lb)   LMP  (LMP Unknown)   SpO2 92%   Breastfeeding No   BMI 27.46 kg/m²   No intake or output data in the 24 hours ending 12/17/19 1429    PE:  General Appearance:    Chronically ill appearing, nonresponsive, moderate to severe respiratory distress initially   HEENT:    NC/AT, without obvious abnormality, R eye remains partially open, pupil sluggish, mouth open and dry   Neck:   supple, trachea midline, no JVD   Lungs:     Diffuse rhonchi, RR> 30, air hunger noted    Heart:    RRR, normal S1 and S2, no M/R/G   Abdomen:     Soft, NT, ND, hypoactive BS    Extremities:   Trace BLE edema   Pulses:   Pulses palpable and equal bilaterally   Skin:   Warm, dry, facial flushing   Neurologic:   Nonresponsive   Psych:   Calm           Meds: Reviewed and changes noted    Labs:   Results from last 7 days   Lab Units 12/15/19  1002   WBC 10*3/mm3 10.94*   HEMOGLOBIN g/dL 14.5   HEMATOCRIT % 45.6   PLATELETS 10*3/mm3 261     Results from last 7 days   Lab Units 12/15/19  1002   SODIUM mmol/L 138   POTASSIUM " mmol/L 4.0   CHLORIDE mmol/L 100   CO2 mmol/L 24.3   BUN mg/dL 13   CREATININE mg/dL 0.62   GLUCOSE mg/dL 179*   CALCIUM mg/dL 8.8     Results from last 7 days   Lab Units 12/15/19  1002   SODIUM mmol/L 138   POTASSIUM mmol/L 4.0   CHLORIDE mmol/L 100   CO2 mmol/L 24.3   BUN mg/dL 13   CREATININE mg/dL 0.62   CALCIUM mg/dL 8.8   BILIRUBIN mg/dL 0.5   ALK PHOS U/L 88   ALT (SGPT) U/L 24   AST (SGOT) U/L 23   GLUCOSE mg/dL 179*     Imaging Results (Last 72 Hours)     Procedure Component Value Units Date/Time    XR Chest 1 View [[id number]] Collected:  12/16/19 0757     Updated:  12/16/19 0800    Narrative:       XR CHEST 1 VW-     CLINICAL INDICATION: CHF, PNA; R06.03-Acute respiratory distress;  J69.0-Pneumonitis due to inhalation of food and vomit        COMPARISON: 12/15/2019      TECHNIQUE: Single frontal view of the chest.     FINDINGS:     Right basilar consolidation concerning for pneumonia  CHF  There is no evidence of an acute osseous abnormality.   There are no suspicious-appearing parenchymal soft tissue nodules.          Impression:       CHF and right basilar pneumonia     This report was finalized on 12/16/2019 7:57 AM by Dr. Bill Santillan MD.       CT Chest Pulmonary Embolism [[id number]] Collected:  12/15/19 1225     Updated:  12/15/19 1227    Narrative:       EXAMINATION: CT CHEST PULMONARY EMBOLISM-      Technique: Multiple CT axial images were obtained through the level of  pulmonary arteries, following IV contrast administration per CT PE  protocol.  Volume Rendered 3D or MIP images performed.     Radiation dose reduction techniques were utilized per ALARA protocol.  Automated exposure control was initiated through either or [employer] or  DoseRigRemote software packages by  protocol.                CLINICAL INDICATION:    SOB and Chest Pain     COMPARISON:    None     FINDINGS:    There is bibasilar atalectasis.    There is no pulmonary artery filling defect to suggest pulmonary  embolism.    No pleural effusion.  There is no thoracic adenopathy.   Incidentally imaged upper abdomen is unremarkable.   Bone windows show no acute osseous abnormality.       Impression:          1. No PE.         This report was finalized on 12/15/2019 12:25 PM by Dr. oTmmie Wan MD.       CT Chest Without Contrast [[id number]] Collected:  12/15/19 1118     Updated:  12/15/19 1121    Narrative:       CT CHEST WO CONTRAST-      TECHNIQUE: Multiple axial CT images were obtained from lung apex through  upper abdomen without administration of IV contrast. Reformatted images  in the coronal and/or sagittal plane(s) were generated from the axial  data set to facilitate diagnostic accuracy and/or surgical planning.  Oral Contrast:NONE.     Radiation dose reduction techniques were utilized per ALARA protocol.  Automated exposure control was initiated through either or [employer] ([employer]) or  DoseRight software packages by  protocol.             Clinical information  soa      Comparison  NONE.     Findings  LUNGS: BIBASILAR ATELECTASIS     HEART: Unremarkable.     PERICARDIUM: No effusion.     MEDIASTINUM: No masses. No enlarged lymph nodes.  No fluid collections.     PLEURA: No pleural effusion. No pleural mass or abnormal calcification.     MAJOR AIRWAYS: Clear. No intrinsic mass.     VASCULATURE: No evidence of aneurysm.     VISUALIZED UPPER ABDOMEN:        LIVER: Homogeneous. No focal hepatic mass or ductal dilatation.        SPLEEN: Homogeneous. No splenomegaly.        ADRENALS: No mass.        KIDNEYS: No mass. No obstructive uropathy.  No evidence of  urolithiasis.        GI TRACT: Non-dilated. No definite wall thickening.        PERITONEUM: No free air. No free fluid or loculated fluid  collections.           ABDOMINAL WALL: No focal hernia or mass.           OTHER: None.     BONES: No acute bony abnormality.       Impression:       Impression:  1. Unremarkable exam.  No source for the patient symptoms.  2. Other  incidental/non-acute findings as above.     This report was finalized on 12/15/2019 11:19 AM by Dr. Tommie Wan MD.       XR Chest 1 View [[id number]] Collected:  12/15/19 1041     Updated:  12/15/19 1043    Narrative:       CR Chest 1 Vw    INDICATION:   Noticeable labored breathing and short of breath. Patient unable to communicate     COMPARISON:    Chest x-ray 11/12/2019    FINDINGS:  Single portable AP view(s) of the chest.  Cardiac silhouette size is normal. There is central vascular congestion and interstitial edema with patchy central to bibasilar airspace disease. There lung volumes are mildly diminished. No definite pleural  effusion. No pneumothorax.      Impression:       Findings are most consistent with vascular congestion and interstitial edema with patchy pulmonary edema. There is however no evidence for cardiac silhouette enlargement. Please correlate for clinical evidence of volume overload. Infection felt less  likely, but considered given the lack of cardiac enlargement.    Signer Name: Gianna Caruso MD   Signed: 12/15/2019 10:41 AM   [facility] Name: [email or web address]    [facility]            Diagnostics: Reviewed    A: Baldo Eden is a 64 y.o. yo female with aspiration PNA and pulmpnary edema, Alzheimer's dementia      P:  Pt has used several PRN doses of morphine, only received one scheduled morphine dose since order was placed yesterday. Changed to PRN & scheduled dilaudid by PCP. Stopped in favor of low dose dilaudid gtt given frequency of PRNs by RN staff (11 since yesterday afternoon total).     Stopped ordered solumedrol as already given one dose today and family concerned that it may have caused her shaking previously.     Added PRN haldol to supplement with PRN ativan for agitation, as pt is receiving ativan fairly regularly.     Also added PRN robinul for secretions. Will be high risk with her known dysphagia/aspiration and possible pulmonary edema, despite not  having TFs.     Changed O2 settings for comfort measures only. Please do not titrate above 5L or place mask without notifying palliative care. Adjusting O2 for sats is not beneficial in comfort care management.     Furthermore, I changed vitals to q shift and PRN per family request. Discussed with family that frequent vital checks are not part of comfort care management, outside of checking for a fever and visualizing respiratory rate.     Have suggested trial of neb if they feel pt continues to have air hunger, and discussed that this cannot always be medicated away (the gasping appearance of breathing) but that the goal is for to be breathing a comfortable rate/rhythm and otherwise be calm and not restless.     Family in agreement with plan.     Please call palliative care for further dosage adjustments.     We will continue to follow along. Please do not hesitate to contact us regarding further sx mgmt or GOC needs, including after hours or on weekends via our on call provider at [phone].     Debora Izquierdo MD    12/17/2019

## 2019-12-17 NOTE — PLAN OF CARE
Continuing to give end of life care. Pt shows signs of pain often. Medication given when requested by family or when signs of pain are seen. Pt seems to get some relief from medication during shift. Will continue to monitor the patient.   Problem: Fall Risk (Adult)  Goal: Identify Related Risk Factors and Signs and Symptoms  Flowsheets (Taken 12/17/2019 0307)  Related Risk Factors (Fall Risk): age-related changes; bladder function altered; fatigue/slow reaction; gait/mobility problems; sensory deficits; sleep pattern alteration  Signs and Symptoms (Fall Risk): presence of risk factors     Problem: Patient Care Overview  Goal: Plan of Care Review  Flowsheets (Taken 12/17/2019 0307)  Progress: declining  Plan of Care Reviewed With: patient; family     Problem: Skin Injury Risk (Adult)  Goal: Identify Related Risk Factors and Signs and Symptoms  Flowsheets (Taken 12/17/2019 0307)  Related Risk Factors (Skin Injury Risk): advanced age; cognitive impairment; fluid intake inadequate; infection; medication; mobility impaired; moisture; nutritional deficiencies; tissue perfusion altered     Problem: Breathing Pattern Ineffective (Adult)  Goal: Identify Related Risk Factors and Signs and Symptoms  Flowsheets (Taken 12/17/2019 0307)  Related Risk Factors (Breathing Pattern Ineffective): advanced age; deconditioning; immobility; fatigue; pain; infection  Signs and Symptoms (Breathing Pattern Ineffective): accessory muscle use; activity intolerance; anxiousness; breathing pattern altered; breathlessness; cognition impaired; cough ineffective; grunting; restlessness

## 2019-12-18 NOTE — PROGRESS NOTES
"Palliative Care Progress Note    Date of Admission: 12/15/2019    Code Status:   Current Code Status     Date Active Code Status Order ID Comments User Context       12/16/2019 0954 No CPR [id number]  James Eden MD Inpatient       Questions for Current Code Status     Question Answer Comment    Code Status No CPR     Medical Interventions (Level of Support Prior to Arrest) Comfort Measures     Level Of Support Discussed With Health Care Surrogate      Next of Kin (If No Surrogate)         Advance Directive: None  Surrogate decision maker:      Subjective:    Patient resting well.  Just got turned and having some tachypnea and use of abdominal muscles.  Daughters at bedside.  We discussed use of PRN medication and patient has not had any since dilaudid gtt was started.  I have called out to nursing to bring PRN dose for patient.     Reviewed current scheduled and prn medications for route, type, dose, and frequency.    HYDROmorphone HCl-NaCl      •  glycerin  •  glycopyrrolate  •  haloperidol lactate  •  HYDROmorphone  •  LORazepam  •  sodium chloride    Objective:  PPS 10 /74 (BP Location: Right arm, Patient Position: Lying)   Pulse 97   Temp 99 °F (37.2 °C) (Axillary)   Resp 16   Ht 165.1 cm (65\")   Wt 74.8 kg (165 lb)   LMP  (LMP Unknown)   SpO2 91%   Breastfeeding No   BMI 27.46 kg/m²    Intake & Output (last day)       12/17 0701 - 12/18 0700 12/18 0701 - 12/19 0700          Urine Unmeasured Occurrence 1 x         Lab Results (last 24 hours)     Procedure Component Value Units Date/Time    Blood Culture - Blood, Arm, Right [[id number]] Collected:  12/15/19 1002    Specimen:  Blood from Arm, Right Updated:  12/18/19 1046     Blood Culture No growth at 3 days    Blood Culture - Blood, Arm, Right [[id number]] Collected:  12/15/19 1002    Specimen:  Blood from Arm, Right Updated:  12/18/19 1046     Blood Culture No growth at 3 days        Imaging Results (Last 24 Hours)     ** No results " found for the last 24 hours. **          Physical Exam:  Gen: terminally ill appearing, actively dying   Skin: warm, dry, knees cool to touch and mottling to bilateral knees noted   Eyes: ANNABELLA, conjunctiva clear and moist   HEENT: oropharynx dry and pink, neck soft tissue, no lymphadenopathy   Resp/thorax: use of abdominal muscles, labored, tachypneic, diminished   CV: RRR   ABD: soft, bowel sounds absent   Ext: no edema, no redness  Neuro: non-responsive       Reviewed labs and diagnostic results.   No results found for: HGBA1C  Results from last 7 days   Lab Units 12/15/19  1002   WBC 10*3/mm3 10.94*   HEMOGLOBIN g/dL 14.5   HEMATOCRIT % 45.6   PLATELETS 10*3/mm3 261     Results from last 7 days   Lab Units 12/15/19  1002   SODIUM mmol/L 138   POTASSIUM mmol/L 4.0   CHLORIDE mmol/L 100   CO2 mmol/L 24.3   BUN mg/dL 13   CREATININE mg/dL 0.62   CALCIUM mg/dL 8.8   BILIRUBIN mg/dL 0.5   ALK PHOS U/L 88   ALT (SGPT) U/L 24   AST (SGOT) U/L 23   GLUCOSE mg/dL 179*       Impression: 64 y.o. female admitted with acute respiratory distress 2/2 acute CHF exacerbation +/- aspiration.      Plan:     1.  Goals of Care   - Patient is comfort measures only.  She is too fragile to move back to NH with hospice at this time.  We are providing EOLC here as inpatient and she is requiring continuous IV medications to keep comfortable.  The family's goal is for patient to be comfortable throughout this process.     2.  Tachypnea/dyspnea   - Continuous dilaudid gtt at 0.5mg/hr.  Patient appeared uncomfortable d/t turning of patient.  Discussed with daughters use of PRN medication for BTP or dyspnea/tachypnea.  I have called to nursing and request a dose be given because patient used abdominal muscles for breathing throughout visit.  I also used this time to educate family as to what to look for to report to staff.      3.  Anxiety/agitation   - Has meds onboard for this PRN if needed.     Time: 30 minutes   > 50% time spent in  counseling and education concerning current orders for symptom management with daughters, nursing, palliative care RN.     Irena Sesay, APRN  [phone]  12/18/19  11:42 AM

## 2019-12-18 NOTE — PROGRESS NOTES
[facility] HOSPITALIST PROGRESS NOTE     Patient Identification:  Name:  Baldo Eden  Age:  64 y.o.  Sex:  female  :  1955  MRN:  [id number]  Visit Number:  [id number]  ROOM: [address] [place] [address]     Primary Care Provider:  Kashmir Garcia MD    Length of stay in inpatient status:  3    Subjective     Chief Compliant:    Chief Complaint   Patient presents with   • Respiratory Distress       History of Presenting Illness:      Patient more comfortable this AM. Family reports symptoms well controlled overnight. Continued to support them letting us know if symptoms not controlled. Both daughters supportive bedside. Facial flushing has resolved.     ROS:  Unable to obtain 2/2 AMS.     Objective     Current Hospital Meds:  ipratropium-albuterol 3 mL Nebulization Q6H - RT   sodium chloride 10 mL Intravenous Q12H     HYDROmorphone HCl-NaCl        Current Antimicrobial Therapy:  Anti-Infectives (From admission, onward)    Ordered     Dose/Rate Route Frequency Start Stop    12/15/19 0950  clindamycin (CLEOCIN) 600 mg in dextrose 5% 50 mL IVPB (premix)     Ordering Provider:  Erik Gilbert MD    600 mg  50 mL/hr over 60 Minutes Intravenous Once 12/15/19 1000 12/15/19 1110    12/15/19 0950  piperacillin-tazobactam (ZOSYN) 3.375 g/100 mL 0.9% NS IVPB (mbp)     Ordering Provider:  Erik Gilbert MD    3.375 g  over 30 Minutes Intravenous Once 12/15/19 0952 12/15/19 1045        Current Diuretic Therapy:  Diuretics (From admission, onward)    Ordered     Dose/Rate Route Frequency Start Stop    19 1655  furosemide (LASIX) injection 80 mg     Ordering Provider:  James Eden MD    80 mg Intravenous Once 19 1745 19 1700    12/15/19 1442  furosemide (LASIX) injection 80 mg     Ordering Provider:  Alba Johnson PA-C    80 mg Intravenous Once 12/15/19 2300 12/15/19 1948    12/15/19 1427  furosemide (LASIX) injection 80 mg     Ordering Provider:  Alba Johnson PA-C    80  mg Intravenous Once 12/15/19 1700 12/15/19 1652        ----------------------------------------------------------------------------------------------------------------------  Vital Signs:  Temp:  [98.1 °F (36.7 °C)-99.2 °F (37.3 °C)] 99 °F (37.2 °C)  Heart Rate:  [] 97  Resp:  [16-25] 16  BP: (125-144)/(74-79) 125/74  SpO2:  [88 %-91 %] 91 %  on  Flow (L/min):  [4.5] 4.5;   Device (Oxygen Therapy): nasal cannula  Body mass index is 27.46 kg/m².    Wt Readings from Last 3 Encounters:   12/18/19 74.8 kg (165 lb)   11/12/19 73 kg (161 lb)   09/13/18 49.6 kg (109 lb 6 oz)     Intake & Output (last 3 days)       12/15 0701 - 12/16 0700 12/16 0701 - 12/17 0700 12/17 0701 - 12/18 0700 12/18 0701 - 12/19 0700    IV Piggyback 2150       Total Intake(mL/kg) 2150 (28.8)       Net +2150               Urine Unmeasured Occurrence 6 x 2 x 1 x         NPO Diet  ----------------------------------------------------------------------------------------------------------------------  Physical exam:  Gen: NAD. Laying in bed.   Head: Normocephalic and atraumatic.    Eyes: Conjunctivae and lids are normal.   Cardiovascular: Normal rate, regular rhythm, S1 normal and S2 normal.   Pulmonary/Chest: Slightly improved breath sounds, still course bilaterally.   Abdominal: Soft. Bowel sounds are normal. G-tube in place   Neuro: Nonverbal. Awake but not alert or oriented.   Skin: Diaphoretic. Warm. No rashes noted.   MSK: No red or swollen joints.   ----------------------------------------------------------------------------------------------------------------------  Tele:    ----------------------------------------------------------------------------------------------------------------------  Results from last 7 days   Lab Units 12/15/19  1002   CRP mg/dL 9.28*   LACTATE mmol/L 1.9   WBC 10*3/mm3 10.94*   HEMOGLOBIN g/dL 14.5   HEMATOCRIT % 45.6   MCV fL 86.5   MCHC g/dL 31.8   PLATELETS 10*3/mm3 261   INR  0.98     Results from last 7 days    Lab Units 12/15/19  1002   PH, ARTERIAL pH units 7.507*   PO2 ART mm Hg 61.7*   PCO2, ARTERIAL mm Hg 36.8   HCO3 ART mmol/L 29.1*     Results from last 7 days   Lab Units 12/15/19  1002   SODIUM mmol/L 138   POTASSIUM mmol/L 4.0   MAGNESIUM mg/dL 2.1   CHLORIDE mmol/L 100   CO2 mmol/L 24.3   BUN mg/dL 13   CREATININE mg/dL 0.62   EGFR IF NONAFRICN AM mL/min/1.73 97   CALCIUM mg/dL 8.8   GLUCOSE mg/dL 179*   ALBUMIN g/dL 3.33*   BILIRUBIN mg/dL 0.5   ALK PHOS U/L 88   AST (SGOT) U/L 23   ALT (SGPT) U/L 24   Estimated Creatinine Clearance: 92.8 mL/min (by C-G formula based on SCr of 0.62 mg/dL).  No results found for: AMMONIA  Results from last 7 days   Lab Units 12/15/19  1002   TROPONIN T ng/mL <0.010     Results from last 7 days   Lab Units 12/15/19  1002   PROBNP pg/mL 471.9         No results found for: HGBA1C, POCGLU  Lab Results   Component Value Date    TSH 4.995 (H) 01/14/2018    FREET4 1.08 01/14/2018     No results found for: PREGTESTUR, PREGSERUM, HCG, HCGQUANT  Pain Management Panel     There is no flowsheet data to display.        Brief Urine Lab Results  (Last result in the past 365 days)      Color   Clarity   Blood   Leuk Est   Nitrite   Protein   CREAT   Urine HCG        12/15/19 1422 Yellow Turbid Moderate (2+) Large (3+) Negative Trace             Blood Culture   Date Value Ref Range Status   12/15/2019 No growth at 3 days  Preliminary   12/15/2019 No growth at 3 days  Preliminary     Urine Culture   Date Value Ref Range Status   12/15/2019 No growth  Final     No results found for: WOUNDCX  No results found for: STOOLCX  No results found for: RESPCX  No results found for: AFBCX  Results from last 7 days   Lab Units 12/15/19  1046 12/15/19  1002   PROCALCITONIN ng/mL 0.07*  --    LACTATE mmol/L  --  1.9   CRP mg/dL  --  9.28*       I have personally looked at the labs and they are summarized  above.  ----------------------------------------------------------------------------------------------------------------------  Detailed radiology reports for the last 24 hours:    Imaging Results (Last 24 Hours)     ** No results found for the last 24 hours. **        Assessment & Plan    #Acute hypoxic respiratory failure and sepsis 2/2 aspiration pneumonia   - Differential included aspiration pneumonia vs. pneumonitis, pulmonary edema  - ProBNP normal, however, imaging more consistent with pulmonary edema   - CTPE did not reveal PE   - Repeat chest x-ray revealed RLL consolidation that likely represents aspiration pneumonia. Suspect the aspiration event was driving hypoxia.   - After multiple goals of care conversations, family decided to make patient comfort care on 12/16  - Patient was diuresed for clinical volume overload   - Stopped Zosyn as per family's wishes   - BiPAP stopped. Patient on NC for comfort.   - PRN morphine changed to dilaudid for possible drug rash. Regimen was escalated to hourly and still not controlling symptoms. Patient transitioned to dilaudid gtt.   - Patient symptoms much better controlled. Will continue dilaudid gtt at 0.5/hr. Continue PRN ativan.   - Palliative care consulted. Appreciate recs.     #End stage Alzheimer's disease   - Supportive care     #Hematemesis vs. Hemoptysis   - Possibly related to edema or pneumonitis   - Family not interested in endoscopic evaluations   - Will give SCDs for DVT prophylaxis     F: NPO, diuresing as above   E: Replace as needed   N: NPO    Code status: DNR/DNI. No pressors. Poor baseline quality of life.     Dispo: Comfort care. Palliative care consulted. If patient stabilizes suspect she will go back to nursing home with hospice.        The patient is high risk due to the following diagnoses/reasons:  Requiring dilaudid gtt for symptom control.     VTE Prophylaxis:   Mechanical Order History:      Ordered        12/15/19 1442  Place Sequential  Compression Device  Once,   Status:  Canceled         12/15/19 1442  Maintain Sequential Compression Device  Continuous,   Status:  Canceled         12/15/19 1427  Place Sequential Compression Device  Once,   Status:  Canceled         12/15/19 1427  Maintain Sequential Compression Device  Continuous,   Status:  Canceled                 Pharmalogical Order History:     Ordered     Dose Route Frequency Stop    12/15/19 1442  heparin (porcine) 5000 UNIT/ML injection 5,000 Units  Status:  Discontinued      5,000 Units SC Every 8 Hours Scheduled 12/15/19 1519    12/15/19 1442  heparin (porcine) 5000 UNIT/ML injection 5,000 Units  Status:  Discontinued      5,000 Units SC Every 8 Hours Scheduled 12/15/19 1508    12/15/19 1442  heparin (porcine) 5000 UNIT/ML injection 5,000 Units  Status:  Discontinued      5,000 Units SC Every 8 Hours Scheduled 12/15/19 1508          James Eden MD  [facility]  12/18/19  2:18 PM

## 2019-12-18 NOTE — PLAN OF CARE
Patient resting with eyes closed, mouth breathing noted patient is breathing very hard from abdomen family is aware and concerned.  We will continue to give additional pain med's as needed via the patient's family request.  No change in the patient's status today. Monitoring

## 2019-12-18 NOTE — PROGRESS NOTES
[facility] HOSPITALIST PROGRESS NOTE     Patient Identification:  Name:  Baldo Eden  Age:  64 y.o.  Sex:  female  :  1955  MRN:  [id number]  Visit Number:  [id number]  ROOM: [address] [place] [address]     Primary Care Provider:  Kashmir Garcia MD    Length of stay in inpatient status:  2    Subjective     Chief Compliant:    Chief Complaint   Patient presents with   • Respiratory Distress       History of Presenting Illness:    Patient rested well overnight per her family and this morning became more agitated and uncomfortable. Family continues to be continuously bedside and supportive.     ROS:  Unable to obtain 2/2 AMS     Objective     Current Hospital Meds:  ipratropium-albuterol 3 mL Nebulization Q6H - RT   sodium chloride 10 mL Intravenous Q12H     HYDROmorphone HCl-NaCl        Current Antimicrobial Therapy:  Anti-Infectives (From admission, onward)    Ordered     Dose/Rate Route Frequency Start Stop    12/15/19 0950  clindamycin (CLEOCIN) 600 mg in dextrose 5% 50 mL IVPB (premix)     Ordering Provider:  Erik Gilbert MD    600 mg  50 mL/hr over 60 Minutes Intravenous Once 12/15/19 1000 12/15/19 1110    12/15/19 0950  piperacillin-tazobactam (ZOSYN) 3.375 g/100 mL 0.9% NS IVPB (mbp)     Ordering Provider:  Erik Gilbert MD    3.375 g  over 30 Minutes Intravenous Once 12/15/19 0952 12/15/19 1045        Current Diuretic Therapy:  Diuretics (From admission, onward)    Ordered     Dose/Rate Route Frequency Start Stop    19 1655  furosemide (LASIX) injection 80 mg     Ordering Provider:  James Eden MD    80 mg Intravenous Once 19 1745 19 1700    12/15/19 1442  furosemide (LASIX) injection 80 mg     Ordering Provider:  Alba Johnson PA-C    80 mg Intravenous Once 12/15/19 2300 12/15/19 1948    12/15/19 1427  furosemide (LASIX) injection 80 mg     Ordering Provider:  Alba Johnson PA-C    80 mg Intravenous Once 12/15/19 1700 12/15/19 1652         ----------------------------------------------------------------------------------------------------------------------  Vital Signs:  Temp:  [98.1 °F (36.7 °C)-99.2 °F (37.3 °C)] 99.2 °F (37.3 °C)  Heart Rate:  [] 101  Resp:  [22-29] 22  BP: (104-145)/(70-79) 131/78  SpO2:  [88 %-93 %] 89 %  on  Flow (L/min):  [4.5] 4.5;   Device (Oxygen Therapy): nasal cannula  Body mass index is 27.46 kg/m².    Wt Readings from Last 3 Encounters:   12/17/19 74.8 kg (165 lb)   11/12/19 73 kg (161 lb)   09/13/18 49.6 kg (109 lb 6 oz)     Intake & Output (last 3 days)       12/15 0701 - 12/16 0700 12/16 0701 - 12/17 0700 12/17 0701 - 12/18 0700    IV Piggyback 2150      Total Intake(mL/kg) 2150 (28.8)      Net +2150             Urine Unmeasured Occurrence 6 x 2 x 1 x        NPO Diet  ----------------------------------------------------------------------------------------------------------------------  Physical exam:  Gen: Mild air hunger and distress. St  Head: Normocephalic and atraumatic. Facial flushing.   Eyes: Conjunctivae and lids are normal.   Cardiovascular: Normal rate, regular rhythm, S1 normal and S2 normal.   Pulmonary/Chest: Slightly improved breath sounds, still course bilaterally.   Abdominal: Soft. Bowel sounds are normal. G-tube in place   Neuro: Nonverbal. Awake but not alert or oriented.   Skin: Diaphoretic. Warm. No rashes noted.   MSK: No red or swollen joints.   ----------------------------------------------------------------------------------------------------------------------  Tele:    ----------------------------------------------------------------------------------------------------------------------  Results from last 7 days   Lab Units 12/15/19  1002   CRP mg/dL 9.28*   LACTATE mmol/L 1.9   WBC 10*3/mm3 10.94*   HEMOGLOBIN g/dL 14.5   HEMATOCRIT % 45.6   MCV fL 86.5   MCHC g/dL 31.8   PLATELETS 10*3/mm3 261   INR  0.98     Results from last 7 days   Lab Units 12/15/19  1002   PH, ARTERIAL pH  units 7.507*   PO2 ART mm Hg 61.7*   PCO2, ARTERIAL mm Hg 36.8   HCO3 ART mmol/L 29.1*     Results from last 7 days   Lab Units 12/15/19  1002   SODIUM mmol/L 138   POTASSIUM mmol/L 4.0   MAGNESIUM mg/dL 2.1   CHLORIDE mmol/L 100   CO2 mmol/L 24.3   BUN mg/dL 13   CREATININE mg/dL 0.62   EGFR IF NONAFRICN AM mL/min/1.73 97   CALCIUM mg/dL 8.8   GLUCOSE mg/dL 179*   ALBUMIN g/dL 3.33*   BILIRUBIN mg/dL 0.5   ALK PHOS U/L 88   AST (SGOT) U/L 23   ALT (SGPT) U/L 24   Estimated Creatinine Clearance: 92.8 mL/min (by C-G formula based on SCr of 0.62 mg/dL).  No results found for: AMMONIA  Results from last 7 days   Lab Units 12/15/19  1002   TROPONIN T ng/mL <0.010     Results from last 7 days   Lab Units 12/15/19  1002   PROBNP pg/mL 471.9         No results found for: HGBA1C, POCGLU  Lab Results   Component Value Date    TSH 4.995 (H) 01/14/2018    FREET4 1.08 01/14/2018     No results found for: PREGTESTUR, PREGSERUM, HCG, HCGQUANT  Pain Management Panel     There is no flowsheet data to display.        Brief Urine Lab Results  (Last result in the past 365 days)      Color   Clarity   Blood   Leuk Est   Nitrite   Protein   CREAT   Urine HCG        12/15/19 1422 Yellow Turbid Moderate (2+) Large (3+) Negative Trace             Blood Culture   Date Value Ref Range Status   12/15/2019 No growth at 2 days  Preliminary   12/15/2019 No growth at 2 days  Preliminary     Urine Culture   Date Value Ref Range Status   12/15/2019 No growth  Final     No results found for: WOUNDCX  No results found for: STOOLCX  No results found for: RESPCX  No results found for: AFBCX  Results from last 7 days   Lab Units 12/15/19  1046 12/15/19  1002   PROCALCITONIN ng/mL 0.07*  --    LACTATE mmol/L  --  1.9   CRP mg/dL  --  9.28*       I have personally looked at the labs and they are summarized above.  ----------------------------------------------------------------------------------------------------------------------  Detailed radiology  reports for the last 24 hours:    Imaging Results (Last 24 Hours)     ** No results found for the last 24 hours. **        Assessment & Plan    #Acute hypoxic respiratory failure and sepsis 2/2 aspiration pneumonia   - Differential included aspiration pneumonia vs. pneumonitis, pulmonary edema  - ProBNP normal, however, imaging more consistent with pulmonary edema   - CTPE did not reveal PE   - Repeat chest x-ray revealed RLL consolidation that likely represents aspiration pneumonia. Suspect the aspiration event was driving hypoxia.   - After multiple goals of care conversations, family decided to make patient comfort care on 12/16  - Patient was diuresed for clinical volume overload   - Stopped Zosyn as per family's wishes   - BiPAP stopped. Patient on NC for comfort.   - PRN morphine changed to dilaudid for possible drug rash. Regimen was escalated to hourly and still not controlling symptoms. Patient transitioned to dilaudid gtt currently 0.5/hr. Continue PRN ativan.   - Palliative care consulted. Appreciate recs.     #End stage Alzheimer's disease   - Supportive care     #Hematemesis vs. Hemoptysis   - Possibly related to edema or pneumonitis   - Family not interested in endoscopic evaluations   - Will give SCDs for DVT prophylaxis     F: NPO, diuresing as above   E: Replace as needed   N: NPO    Code status: DNR/DNI. No pressors. Poor baseline quality of life.     Dispo: Comfort care. Palliative care consulted. If patient stabilizes suspect she will go back to nursing home with hospice.        VTE Prophylaxis:   Mechanical Order History:      Ordered        12/15/19 1442  Place Sequential Compression Device  Once,   Status:  Canceled         12/15/19 1442  Maintain Sequential Compression Device  Continuous,   Status:  Canceled         12/15/19 1427  Place Sequential Compression Device  Once,   Status:  Canceled         12/15/19 1427  Maintain Sequential Compression Device  Continuous,   Status:  Canceled                  Pharmalogical Order History:     Ordered     Dose Route Frequency Stop    12/15/19 1442  heparin (porcine) 5000 UNIT/ML injection 5,000 Units  Status:  Discontinued      5,000 Units SC Every 8 Hours Scheduled 12/15/19 1519    12/15/19 1442  heparin (porcine) 5000 UNIT/ML injection 5,000 Units  Status:  Discontinued      5,000 Units SC Every 8 Hours Scheduled 12/15/19 1508    12/15/19 1442  heparin (porcine) 5000 UNIT/ML injection 5,000 Units  Status:  Discontinued      5,000 Units SC Every 8 Hours Scheduled 12/15/19 1508          James Eden MD  [facility]  12/17/19  7:55 PM

## 2019-12-18 NOTE — PROGRESS NOTES
Discharge Planning Assessment   Dominic     Patient Name: Baldo Eden  MRN: [id number]  Today's Date: 12/18/2019    Admit Date: 12/15/2019      Discharge Plan     Row Name 12/18/19 1639       Plan    Plan  Pt admitted on 12/15/19 from [facility].  SS noted Palliative Care progress note.  SS will follow.           NATA MccurdyW

## 2019-12-18 NOTE — PLAN OF CARE
Family satisfied with change in plan of care. Pt seems to be resting much better with less anxiousness. Currently O2 is above 90% and pt HR in 90's. Will continue to monitor.   Problem: Fall Risk (Adult)  Goal: Identify Related Risk Factors and Signs and Symptoms  Outcome: Ongoing (interventions implemented as appropriate)  Flowsheets (Taken 12/18/2019 0412)  Related Risk Factors (Fall Risk): age-related changes;bladder function altered;fatigue/slow reaction;gait/mobility problems;slippery/uneven surfaces;environment unfamiliar  Signs and Symptoms (Fall Risk): presence of risk factors  Goal: Absence of Fall  Outcome: Ongoing (interventions implemented as appropriate)     Problem: Patient Care Overview  Goal: Plan of Care Review  Outcome: Ongoing (interventions implemented as appropriate)  Flowsheets (Taken 12/18/2019 0412)  Progress: declining  Plan of Care Reviewed With: patient;family  Goal: Individualization and Mutuality  Outcome: Ongoing (interventions implemented as appropriate)  Goal: Discharge Needs Assessment  Outcome: Ongoing (interventions implemented as appropriate)  Goal: Interprofessional Rounds/Family Conf  Outcome: Ongoing (interventions implemented as appropriate)     Problem: Skin Injury Risk (Adult)  Goal: Identify Related Risk Factors and Signs and Symptoms  Outcome: Ongoing (interventions implemented as appropriate)  Flowsheets (Taken 12/18/2019 0412)  Related Risk Factors (Skin Injury Risk): advanced age;cognitive impairment;fluid intake inadequate;mobility impaired;moisture;nutritional deficiencies;tissue perfusion altered  Goal: Skin Health and Integrity  Outcome: Ongoing (interventions implemented as appropriate)     Problem: Breathing Pattern Ineffective (Adult)  Goal: Identify Related Risk Factors and Signs and Symptoms  Outcome: Ongoing (interventions implemented as appropriate)  Flowsheets (Taken 12/18/2019 0412)  Related Risk Factors (Breathing Pattern Ineffective): advanced age;  deconditioning; immobility; obesity; fatigue; pain; infection  Signs and Symptoms (Breathing Pattern Ineffective): accessory muscle use; activity intolerance; anxiousness; breathing pattern altered; breathlessness; cognition impaired; cough ineffective; grunting; restlessness  Goal: Effective Oxygenation/Ventilation  Outcome: Ongoing (interventions implemented as appropriate)  Goal: Anxiety/Fear Reduction  Outcome: Ongoing (interventions implemented as appropriate)

## 2019-12-19 NOTE — PROGRESS NOTES
"Palliative Care Progress Note    Date of Admission: 12/15/2019    Code Status:   Current Code Status     Date Active Code Status Order ID Comments User Context       12/16/2019 0954 No CPR [id number]  James Eden MD Inpatient       Questions for Current Code Status     Question Answer Comment    Code Status No CPR     Medical Interventions (Level of Support Prior to Arrest) Comfort Measures     Level Of Support Discussed With Health Care Surrogate      Next of Kin (If No Surrogate)         Advance Directive: None  Surrogate decision maker:      Subjective:    Patient seen with daughter Amelia at bedside.  Patient is blinking and looking around some, but has noted dyspnea and tachypnea again with abdominal muscle use.  Amelia reports that they had turned her recently but that she had not calmed down yet.  We discussed PRN med again, she tells me it typically- more so yesterday, took 2 doses of PRN to calm her.  Therefore, we will increase the PRN amount.      Reviewed current scheduled and prn medications for route, type, dose, and frequency.    Used PRN several times yesterday and none so far today.  Spoke with RN and she states patient has been comfortable all day.  I discussed PRN med change and requested dose be given now as daughter is okay with this also.     HYDROmorphone HCl-NaCl      •  artificial tears  •  glycerin  •  glycopyrrolate  •  haloperidol lactate  •  HYDROmorphone  •  ipratropium-albuterol  •  LORazepam  •  sodium chloride    Objective:  PPS 10 /80 (BP Location: Right arm, Patient Position: Lying)   Pulse 110   Temp (!) 102.7 °F (39.3 °C) (Axillary) Comment: nurse notified  Resp 22   Ht 165.1 cm (65\")   Wt 74.8 kg (165 lb)   LMP  (LMP Unknown)   SpO2 90%   Breastfeeding No   BMI 27.46 kg/m²    Intake & Output (last day)       12/18 0701 - 12/19 0700 12/19 0701 - 12/20 0700          Urine Unmeasured Occurrence 3 x         Lab Results (last 24 hours)     Procedure " Component Value Units Date/Time    Blood Culture - Blood, Arm, Right [[id number]] Collected:  12/15/19 1002    Specimen:  Blood from Arm, Right Updated:  12/19/19 1041     Blood Culture No growth at 4 days    Blood Culture - Blood, Arm, Right [[id number]] Collected:  12/15/19 1002    Specimen:  Blood from Arm, Right Updated:  12/19/19 1041     Blood Culture No growth at 4 days        Imaging Results (Last 24 Hours)     ** No results found for the last 24 hours. **          Physical Exam:  Gen: terminally ill, actively dying   Skin: warm, dry, knees cool and mottled.   Eyes: ANNABELLA, conjunctiva clear and moist   HEENT: oropharynx dry and pink, neck soft tissue, neck hyperextended.  Resp/thorax: slightly labored and tachypneic, diminished throughout   CV: RRR   ABD: soft, bowel sounds absent   Ext: no edema, no redness   Neuro: eyes open, blinking some but stares past you, non-responsive otherwise       Reviewed labs and diagnostic results.   No results found for: HGBA1C  Results from last 7 days   Lab Units 12/15/19  1002   WBC 10*3/mm3 10.94*   HEMOGLOBIN g/dL 14.5   HEMATOCRIT % 45.6   PLATELETS 10*3/mm3 261     Results from last 7 days   Lab Units 12/15/19  1002   SODIUM mmol/L 138   POTASSIUM mmol/L 4.0   CHLORIDE mmol/L 100   CO2 mmol/L 24.3   BUN mg/dL 13   CREATININE mg/dL 0.62   CALCIUM mg/dL 8.8   BILIRUBIN mg/dL 0.5   ALK PHOS U/L 88   ALT (SGPT) U/L 24   AST (SGOT) U/L 23   GLUCOSE mg/dL 179*       Impression: 64 y.o. female admitted with acute respiratory distress 2/2 acute CHF exacerbation +/- aspiration.      Plan:     1.  Goals of Care   - Patient remains comfort measures.  She is too fragile at this time to move to NH with hospice and requires continuous IV meds to keep comfortable.  We are providing EOLC here as inpatient and unless patient improves, which I feel will not be the case, she will likely pass here at hospital.      2.  Dyspnea/tachypnea   - Daughter reports no use of PRN today because she  had been comfortable up until they moved her recently.  She says that yesterday it would take 2 doses of the PRN med to calm her.  Therefore, they would wait 2 hours for meds to work.  So I have increased PRN dilaudid to 0.5mg every hour PRN and relayed to RN.  I have also requested they pre-medicate prior to moving patient since she is getting so worked up with turns.  RN verbalizes understanding.     3.  Fever   - Fever up to 102 this AM.  No one called to report.  RN states blankets removed and she cooled down.  PRN tylenol suppository left for if this happens again.  If suppository ineffective we will try toradol.  I have encouraged to call palliative if not improved.        Time: 30 minutes   > 50% time spent in counseling and education concerning current orders for symptom management with daughter AmeliaSHOLA    Irena Sesay, APRN  [phone]  12/19/19  6:31 PM

## 2019-12-19 NOTE — PLAN OF CARE
Pt is resting, breathing with mouth open. Oral care given Q2 hrs or less. Continuous pain medication being administered per order with PRN pain medication as well. Family at bedside. Support given. Will continue to monitor and follow plan of care.

## 2019-12-20 NOTE — PROGRESS NOTES
"Palliative Care Progress Note    Date of Admission: 12/15/2019    Code Status:   Current Code Status     Date Active Code Status Order ID Comments User Context       12/16/2019 0954 No CPR [id number]  James Eden MD Inpatient       Questions for Current Code Status     Question Answer Comment    Code Status No CPR     Medical Interventions (Level of Support Prior to Arrest) Comfort Measures     Level Of Support Discussed With Health Care Surrogate      Next of Kin (If No Surrogate)         Advance Directive: None  Surrogate decision maker:      Subjective:    Patient is restless.  Daughter reports just had episode of some shaking.  RN Sahara reports that she is pushing IV ativan currently.  Patient having some periods of apnea and face becoming very red and cyanotic followed by congested cough and tachypnea.      Reviewed current scheduled and prn medications for route, type, dose, and frequency.    HYDROmorphone HCl-NaCl      •  acetaminophen  •  artificial tears  •  glycerin  •  glycopyrrolate  •  haloperidol lactate  •  HYDROmorphone  •  ipratropium-albuterol  •  LORazepam  •  sodium chloride    Objective:  PPS 10 /76 (BP Location: Right arm, Patient Position: Lying)   Pulse 105   Temp 99.8 °F (37.7 °C) (Axillary) Comment: patients family only wanted temp taken  Resp 28   Ht 165.1 cm (65\")   Wt 72.3 kg (159 lb 4.8 oz)   LMP  (LMP Unknown)   SpO2 90%   Breastfeeding No   BMI 26.51 kg/m²    Intake & Output (last day)       12/19 0701 - 12/20 0700 12/20 0701 - 12/21 0700          Urine Unmeasured Occurrence 2 x         Lab Results (last 24 hours)     Procedure Component Value Units Date/Time    Blood Culture - Blood, Arm, Right [[id number]] Collected:  12/15/19 1002    Specimen:  Blood from Arm, Right Updated:  12/20/19 1030     Blood Culture No growth at 5 days    Blood Culture - Blood, Arm, Right [[id number]] Collected:  12/15/19 1002    Specimen:  Blood from Arm, Right Updated:  12/20/19 " 1030     Blood Culture No growth at 5 days        Imaging Results (Last 24 Hours)     ** No results found for the last 24 hours. **          Physical Exam:  Gen: terminally ill, actively dying   Skin: cool and dry, face red and cyanotic   Eyes: ANNABELLA, conjunctiva clear and moist   HEENT: oropharynx dry and pick, neck hyperextended  Resp/thorax: tachypneic and labored, severely diminished, congested cough   CV: irregular   ABD: soft, bowel sounds absent   Ext: no edema, no redness, arms contracted, bilateral foot drop  Neuro: non-responsive     Reviewed labs and diagnostic results.   No results found for: HGBA1C  Results from last 7 days   Lab Units 12/15/19  1002   WBC 10*3/mm3 10.94*   HEMOGLOBIN g/dL 14.5   HEMATOCRIT % 45.6   PLATELETS 10*3/mm3 261     Results from last 7 days   Lab Units 12/15/19  1002   SODIUM mmol/L 138   POTASSIUM mmol/L 4.0   CHLORIDE mmol/L 100   CO2 mmol/L 24.3   BUN mg/dL 13   CREATININE mg/dL 0.62   CALCIUM mg/dL 8.8   BILIRUBIN mg/dL 0.5   ALK PHOS U/L 88   ALT (SGPT) U/L 24   AST (SGOT) U/L 23   GLUCOSE mg/dL 179*       Impression: 64 y.o. female with acute respiratory distress 2/2 acute CHF exacerbation +/- aspiration.  Early late stage ALZ.     Plan:     1.  Goals of Care   - daughter at bedside today.  She has concerns, like most families, if she is doing the right thing bc her mother still lingers.  I have expressed this is every family I have encountered thoughts.  And that it is normal to question how long she is in this EOL phase.  I have assured her that patient is actively dying and that upholding her mother's wishes takes a brave person.  Patient is having periods of apnea and nearing EOL.  I have discussed once again medication regimen and need to use PRNs for her breakthrough of symptoms.  Family is concerned that patient may be stabilizing to go back to NH.  However, I feel patient may be to fragile to transport back to NH and because she is requiring IV medications to  keep her comfortable, the NH would not be able to meet her EOL need.  SS would also need to verify a payor source for hospice at the NH.  So I feel she would be best left here for EOLC.      2.  Dyspnea/tachypnea/apnea   - Dilaudid gtt.  Encouraged family to use PRN meds to ease symptoms and that over the weekend the gtt may need to be increased if PRNs are being used.      3.  Agitation/terminal restlessness   - Patient had PRN dose ativan while I was assessing.  Haldol also available if needed.     4.  Fever   - Not ran high fever again since yesterday morning but still has acetaminophen suppositories as needed.     Time: 30 minutes   > 50% time spent in counseling and education concerning current orders for symptom management with daughter, RN    Irena Sesay, APRN  [phone]  12/20/19  3:05 PM

## 2019-12-20 NOTE — PROGRESS NOTES
Discharge Planning Assessment   Dominic     Patient Name: Baldo Eden  MRN: [id number]  Today's Date: 12/20/2019    Admit Date: 12/15/2019        Discharge Plan     Row Name 12/20/19 1555       Plan    Plan  Pt admitted on 12/15/19 from [facility].  SS noted Palliative Care progress note.  SS will follow and assist with discharge needs.           AMBER Mccurdy

## 2019-12-20 NOTE — PROGRESS NOTES
[facility] HOSPITALIST PROGRESS NOTE     Patient Identification:  Name:  Baldo Eden  Age:  64 y.o.  Sex:  female  :  1955  MRN:  [id number]  Visit Number:  [id number]  ROOM: [address] [place] [address]     Primary Care Provider:  Kashmir Garcia MD    Length of stay in inpatient status:  5    Subjective     Chief Compliant:    Chief Complaint   Patient presents with   • Respiratory Distress       History of Presenting Illness:    Patient's symptoms becoming less controlled overnight an into today. Requiring more PRN dilaudid doses. Family continues to be supportive bedside.     ROS:  Unable to obtain 2/2 AMS.     Objective     Current Hospital Meds:  sodium chloride 10 mL Intravenous Q12H     HYDROmorphone HCl-NaCl        Current Antimicrobial Therapy:  Anti-Infectives (From admission, onward)    Ordered     Dose/Rate Route Frequency Start Stop    12/15/19 0950  clindamycin (CLEOCIN) 600 mg in dextrose 5% 50 mL IVPB (premix)     Ordering Provider:  Erik Gilbert MD    600 mg  50 mL/hr over 60 Minutes Intravenous Once 12/15/19 1000 12/15/19 1110    12/15/19 0950  piperacillin-tazobactam (ZOSYN) 3.375 g/100 mL 0.9% NS IVPB (mbp)     Ordering Provider:  Erik Gilbert MD    3.375 g  over 30 Minutes Intravenous Once 12/15/19 0952 12/15/19 1045        Current Diuretic Therapy:  Diuretics (From admission, onward)    Ordered     Dose/Rate Route Frequency Start Stop    19 1655  furosemide (LASIX) injection 80 mg     Ordering Provider:  James Eden MD    80 mg Intravenous Once 19 1745 19 1700    12/15/19 1442  furosemide (LASIX) injection 80 mg     Ordering Provider:  Alba Johnson PA-C    80 mg Intravenous Once 12/15/19 2300 12/15/19 1948    12/15/19 1427  furosemide (LASIX) injection 80 mg     Ordering Provider:  Alba Johnson PA-C    80 mg Intravenous Once 12/15/19 1700 12/15/19 1652         ----------------------------------------------------------------------------------------------------------------------  Vital Signs:  Temp:  [99.8 °F (37.7 °C)-101.5 °F (38.6 °C)] 99.8 °F (37.7 °C)  Heart Rate:  [105] 105  Resp:  [28] 28  BP: (122)/(76) 122/76  SpO2:  [87 %-90 %] 90 %  on  Flow (L/min):  [4.5] 4.5;   Device (Oxygen Therapy): humidified;nasal cannula  Body mass index is 26.51 kg/m².    Wt Readings from Last 3 Encounters:   12/19/19 72.3 kg (159 lb 4.8 oz)   11/12/19 73 kg (161 lb)   09/13/18 49.6 kg (109 lb 6 oz)     Intake & Output (last 3 days)       12/17 0701 - 12/18 0700 12/18 0701 - 12/19 0700 12/19 0701 - 12/20 0700 12/20 0701 - 12/21 0700            Urine Unmeasured Occurrence 1 x 3 x 2 x         NPO Diet  ----------------------------------------------------------------------------------------------------------------------  Physical exam:  Gen: NAD. Laying in bed. NAD. Uncomfortable. Flushed.   Head: Normocephalic and atraumatic.    Eyes: Conjunctivae and lids are normal.   Cardiovascular: Normal rate, regular rhythm, S1 normal and S2 normal.   Pulmonary/Chest: Slightly improved breath sounds, still course bilaterally.   Abdominal: Soft. Bowel sounds are normal. G-tube in place   Neuro: Nonverbal. Awake but not alert or oriented.   Skin: Diaphoretic. Warm. No rashes noted.   MSK: No red or swollen joints.   ----------------------------------------------------------------------------------------------------------------------  Tele:    ----------------------------------------------------------------------------------------------------------------------  Results from last 7 days   Lab Units 12/15/19  1002   CRP mg/dL 9.28*   LACTATE mmol/L 1.9   WBC 10*3/mm3 10.94*   HEMOGLOBIN g/dL 14.5   HEMATOCRIT % 45.6   MCV fL 86.5   MCHC g/dL 31.8   PLATELETS 10*3/mm3 261   INR  0.98     Results from last 7 days   Lab Units 12/15/19  1002   PH, ARTERIAL pH units 7.507*   PO2 ART mm Hg 61.7*   PCO2,  ARTERIAL mm Hg 36.8   HCO3 ART mmol/L 29.1*     Results from last 7 days   Lab Units 12/15/19  1002   SODIUM mmol/L 138   POTASSIUM mmol/L 4.0   MAGNESIUM mg/dL 2.1   CHLORIDE mmol/L 100   CO2 mmol/L 24.3   BUN mg/dL 13   CREATININE mg/dL 0.62   EGFR IF NONAFRICN AM mL/min/1.73 97   CALCIUM mg/dL 8.8   GLUCOSE mg/dL 179*   ALBUMIN g/dL 3.33*   BILIRUBIN mg/dL 0.5   ALK PHOS U/L 88   AST (SGOT) U/L 23   ALT (SGPT) U/L 24   Estimated Creatinine Clearance: 91.3 mL/min (by C-G formula based on SCr of 0.62 mg/dL).  No results found for: AMMONIA  Results from last 7 days   Lab Units 12/15/19  1002   TROPONIN T ng/mL <0.010     Results from last 7 days   Lab Units 12/15/19  1002   PROBNP pg/mL 471.9         No results found for: HGBA1C, POCGLU  Lab Results   Component Value Date    TSH 4.995 (H) 01/14/2018    FREET4 1.08 01/14/2018     No results found for: PREGTESTUR, PREGSERUM, HCG, HCGQUANT  Pain Management Panel     There is no flowsheet data to display.        Brief Urine Lab Results  (Last result in the past 365 days)      Color   Clarity   Blood   Leuk Est   Nitrite   Protein   CREAT   Urine HCG        12/15/19 1422 Yellow Turbid Moderate (2+) Large (3+) Negative Trace             Blood Culture   Date Value Ref Range Status   12/15/2019 No growth at 5 days  Final   12/15/2019 No growth at 5 days  Final     Urine Culture   Date Value Ref Range Status   12/15/2019 No growth  Final     No results found for: WOUNDCX  No results found for: STOOLCX  No results found for: RESPCX  No results found for: AFBCX  Results from last 7 days   Lab Units 12/15/19  1046 12/15/19  1002   PROCALCITONIN ng/mL 0.07*  --    LACTATE mmol/L  --  1.9   CRP mg/dL  --  9.28*       I have personally looked at the labs and they are summarized above.  ----------------------------------------------------------------------------------------------------------------------  Detailed radiology reports for the last 24 hours:    Imaging Results (Last 24  Hours)     ** No results found for the last 24 hours. **        Assessment & Plan    #Acute hypoxic respiratory failure and sepsis 2/2 aspiration pneumonia   - Differential included aspiration pneumonia vs. pneumonitis, pulmonary edema  - ProBNP normal, however, imaging more consistent with pulmonary edema   - CTPE did not reveal PE   - Repeat chest x-ray revealed RLL consolidation that likely represents aspiration pneumonia. Suspect the aspiration event was driving hypoxia.   - After multiple goals of care conversations, family decided to make patient comfort care on   - Patient was diuresed for clinical volume overload   - Stopped Zosyn as per family's wishes   - BiPAP stopped. Patient on NC for comfort.   - PRN morphine changed to dilaudid for possible drug rash. Regimen was escalated to hourly and still not controlling symptoms. Patient transitioned to dilaudid gtt.   - Patient symptoms less controlled this morning. Face red and patient working more to breathe. Requiring more PRN dilaudid doses. Increase dilaudid PCA to 1 and continue Q1hr PRN dilaudid.   - Palliative care consulted. Appreciate recs.     #End stage Alzheimer's disease   - Supportive care     #Hematemesis vs. Hemoptysis   - Possibly related to edema or pneumonitis   - Family not interested in endoscopic evaluations   - Will give SCDs for DVT prophylaxis     F: NPO, diuresing as above   E: Replace as needed   N: NPO    Code status: DNR/DNI. No pressors. Poor baseline quality of life.     Dispo: Comfort care. Palliative care consulted. Suspect patient will  in the next few days. Since she requires gtt she is not a candidate to go to nursing home w/ hospice.       VTE Prophylaxis:   Mechanical Order History:      Ordered        12/15/19 1442  Place Sequential Compression Device  Once,   Status:  Canceled         12/15/19 144  Maintain Sequential Compression Device  Continuous,   Status:  Canceled         12/15/19 1427  Place Sequential  Compression Device  Once,   Status:  Canceled         12/15/19 1427  Maintain Sequential Compression Device  Continuous,   Status:  Canceled                 Pharmalogical Order History:     Ordered     Dose Route Frequency Stop    12/15/19 1442  heparin (porcine) 5000 UNIT/ML injection 5,000 Units  Status:  Discontinued      5,000 Units SC Every 8 Hours Scheduled 12/15/19 1519    12/15/19 1442  heparin (porcine) 5000 UNIT/ML injection 5,000 Units  Status:  Discontinued      5,000 Units SC Every 8 Hours Scheduled 12/15/19 1508    12/15/19 1442  heparin (porcine) 5000 UNIT/ML injection 5,000 Units  Status:  Discontinued      5,000 Units SC Every 8 Hours Scheduled 12/15/19 1508          James Eden MD  [facility]  12/20/19  6:15 PM

## 2019-12-20 NOTE — NURSING NOTE
Family refuse skin assessments at shift changes. Family request patient be pre-medicated before being moved or assessed at all times.

## 2019-12-20 NOTE — PLAN OF CARE
Problem: Skin Injury Risk (Adult)  Goal: Identify Related Risk Factors and Signs and Symptoms  Outcome: Ongoing (interventions implemented as appropriate)  Flowsheets (Taken 12/20/2019 0149)  Related Risk Factors (Skin Injury Risk): cognitive impairment; fluid intake inadequate; skeletal deformities; tissue perfusion altered; mobility impaired  Note:   No new pressure injuries; family refused turning q2 hours.

## 2019-12-21 NOTE — PROGRESS NOTES
[facility] HOSPITALIST PROGRESS NOTE     Patient Identification:  Name:  Baldo Eden  Age:  64 y.o.  Sex:  female  :  1955  MRN:  [id number]  Visit Number:  [id number]  ROOM: [address] [place] [address]     Primary Care Provider:  Kashmir Garcia MD    Length of stay in inpatient status:  6    Subjective     Chief Compliant:    Chief Complaint   Patient presents with   • Respiratory Distress       History of Presenting Illness:    Patient's symptoms better controlled on increased dose dilaudid gtt. Was called that patient had been desaturating today per tele. Patient's breathing has become less frequent with long pauses. Explained to family death is likely to occur int he next hours to day.     ROS:  Unable to obtain due to AMS     Objective     Current Hospital Meds:  sodium chloride 10 mL Intravenous Q12H     HYDROmorphone HCl-NaCl        Current Antimicrobial Therapy:  Anti-Infectives (From admission, onward)    Ordered     Dose/Rate Route Frequency Start Stop    12/15/19 0950  clindamycin (CLEOCIN) 600 mg in dextrose 5% 50 mL IVPB (premix)     Ordering Provider:  Erik Gilbert MD    600 mg  50 mL/hr over 60 Minutes Intravenous Once 12/15/19 1000 12/15/19 1110    12/15/19 0950  piperacillin-tazobactam (ZOSYN) 3.375 g/100 mL 0.9% NS IVPB (mbp)     Ordering Provider:  Erik Gilbert MD    3.375 g  over 30 Minutes Intravenous Once 12/15/19 0952 12/15/19 1045        Current Diuretic Therapy:  Diuretics (From admission, onward)    Ordered     Dose/Rate Route Frequency Start Stop    19 1655  furosemide (LASIX) injection 80 mg     Ordering Provider:  James Eden MD    80 mg Intravenous Once 19 1745 19 1700    12/15/19 1442  furosemide (LASIX) injection 80 mg     Ordering Provider:  Alba Johnson PA-C    80 mg Intravenous Once 12/15/19 2300 12/15/19 1948    12/15/19 1427  furosemide (LASIX) injection 80 mg     Ordering Provider:  Alba Johnson PA-C    80 mg  Intravenous Once 12/15/19 1700 12/15/19 1652        ----------------------------------------------------------------------------------------------------------------------  Vital Signs:  Temp:  [102.4 °F (39.1 °C)-103.6 °F (39.8 °C)] 103 °F (39.4 °C)  Heart Rate:  [] 89  Resp:  [4-22] 5  SpO2:  [88 %-91 %] 91 %  on  Flow (L/min):  [4.5] 4.5;   Device (Oxygen Therapy): humidified;nasal cannula  Body mass index is 26.51 kg/m².    Wt Readings from Last 3 Encounters:   12/19/19 72.3 kg (159 lb 4.8 oz)   11/12/19 73 kg (161 lb)   09/13/18 49.6 kg (109 lb 6 oz)     Intake & Output (last 3 days)       12/18 0701 - 12/19 0700 12/19 0701 - 12/20 0700 12/20 0701 - 12/21 0700 12/21 0701 - 12/22 0700            Urine Unmeasured Occurrence 3 x 2 x          NPO Diet  ----------------------------------------------------------------------------------------------------------------------  Physical exam:  Gen: NAD. Laying in bed. NAD. Comfortable.    Head: Normocephalic and atraumatic.    Eyes: Conjunctivae and lids are normal.   Cardiovascular: Normal rate, regular rhythm, S1 normal and S2 normal.   Pulmonary/Chest: Slightly improved breath sounds, still course bilaterally.   Abdominal: Soft. Bowel sounds are normal. G-tube in place   Neuro: Nonverbal. Lethargic.   Skin: Diaphoretic. Warm. No rashes noted.   MSK: No red or swollen joints.   ----------------------------------------------------------------------------------------------------------------------  Tele:    ----------------------------------------------------------------------------------------------------------------------  Results from last 7 days   Lab Units 12/15/19  1002   CRP mg/dL 9.28*   LACTATE mmol/L 1.9   WBC 10*3/mm3 10.94*   HEMOGLOBIN g/dL 14.5   HEMATOCRIT % 45.6   MCV fL 86.5   MCHC g/dL 31.8   PLATELETS 10*3/mm3 261   INR  0.98     Results from last 7 days   Lab Units 12/15/19  1002   PH, ARTERIAL pH units 7.507*   PO2 ART mm Hg 61.7*   PCO2, ARTERIAL  mm Hg 36.8   HCO3 ART mmol/L 29.1*     Results from last 7 days   Lab Units 12/15/19  1002   SODIUM mmol/L 138   POTASSIUM mmol/L 4.0   MAGNESIUM mg/dL 2.1   CHLORIDE mmol/L 100   CO2 mmol/L 24.3   BUN mg/dL 13   CREATININE mg/dL 0.62   EGFR IF NONAFRICN AM mL/min/1.73 97   CALCIUM mg/dL 8.8   GLUCOSE mg/dL 179*   ALBUMIN g/dL 3.33*   BILIRUBIN mg/dL 0.5   ALK PHOS U/L 88   AST (SGOT) U/L 23   ALT (SGPT) U/L 24   Estimated Creatinine Clearance: 91.3 mL/min (by C-G formula based on SCr of 0.62 mg/dL).  No results found for: AMMONIA  Results from last 7 days   Lab Units 12/15/19  1002   TROPONIN T ng/mL <0.010     Results from last 7 days   Lab Units 12/15/19  1002   PROBNP pg/mL 471.9         No results found for: HGBA1C, POCGLU  Lab Results   Component Value Date    TSH 4.995 (H) 01/14/2018    FREET4 1.08 01/14/2018     No results found for: PREGTESTUR, PREGSERUM, HCG, HCGQUANT  Pain Management Panel     There is no flowsheet data to display.        Brief Urine Lab Results  (Last result in the past 365 days)      Color   Clarity   Blood   Leuk Est   Nitrite   Protein   CREAT   Urine HCG        12/15/19 1422 Yellow Turbid Moderate (2+) Large (3+) Negative Trace             Blood Culture   Date Value Ref Range Status   12/15/2019 No growth at 5 days  Final   12/15/2019 No growth at 5 days  Final     Urine Culture   Date Value Ref Range Status   12/15/2019 No growth  Final     No results found for: WOUNDCX  No results found for: STOOLCX  No results found for: RESPCX  No results found for: AFBCX  Results from last 7 days   Lab Units 12/15/19  1046 12/15/19  1002   PROCALCITONIN ng/mL 0.07*  --    LACTATE mmol/L  --  1.9   CRP mg/dL  --  9.28*       I have personally looked at the labs and they are summarized above.  ----------------------------------------------------------------------------------------------------------------------  Detailed radiology reports for the last 24 hours:    Imaging Results (Last 24 Hours)      ** No results found for the last 24 hours. **        Assessment & Plan    #Acute hypoxic respiratory failure and sepsis 2/2 aspiration pneumonia   - Differential included aspiration pneumonia vs. pneumonitis, pulmonary edema  - ProBNP normal, however, imaging more consistent with pulmonary edema   - CTPE did not reveal PE   - Repeat chest x-ray revealed RLL consolidation that likely represents aspiration pneumonia. Suspect the aspiration event was driving hypoxia.   - After multiple goals of care conversations, family decided to make patient comfort care on 12/16  - Patient was diuresed for clinical volume overload   - Stopped Zosyn as per family's wishes   - BiPAP stopped. Patient on NC for comfort.   - PRN morphine changed to dilaudid for possible drug rash. Regimen was escalated to hourly and still not controlling symptoms. Patient transitioned to dilaudid gtt.   - Patient symptoms well controlled on 1mg/hr dilaudid gtt.   - Patient's breathing has become irregular. Saturations dropping per tele. Prognosis is hours to less likely days.   - Palliative care consulted. Appreciate recs.     #End stage Alzheimer's disease   - Supportive care     #Hematemesis vs. Hemoptysis   - Possibly related to edema or pneumonitis   - Family not interested in endoscopic evaluations   - Will give SCDs for DVT prophylaxis     F: NPO, diuresing as above   E: Replace as needed   N: NPO    Code status: DNR/DNI. No pressors. Poor baseline quality of life.     Dispo: Comfort care. Palliative care consulted. Since she requires gtt she is not a candidate to go to nursing home w/ hospice.     The patient is high risk due to the following diagnoses/reasons:  IV pain meds.     VTE Prophylaxis:   Mechanical Order History:      Ordered        12/15/19 1442  Place Sequential Compression Device  Once,   Status:  Canceled         12/15/19 1442  Maintain Sequential Compression Device  Continuous,   Status:  Canceled         12/15/19 1427  Place  Sequential Compression Device  Once,   Status:  Canceled         12/15/19 1427  Maintain Sequential Compression Device  Continuous,   Status:  Canceled                 Pharmalogical Order History:     Ordered     Dose Route Frequency Stop    12/15/19 1442  heparin (porcine) 5000 UNIT/ML injection 5,000 Units  Status:  Discontinued      5,000 Units SC Every 8 Hours Scheduled 12/15/19 1519    12/15/19 1442  heparin (porcine) 5000 UNIT/ML injection 5,000 Units  Status:  Discontinued      5,000 Units SC Every 8 Hours Scheduled 12/15/19 1508    12/15/19 1442  heparin (porcine) 5000 UNIT/ML injection 5,000 Units  Status:  Discontinued      5,000 Units SC Every 8 Hours Scheduled 12/15/19 1508          James Eden MD  [facility]  12/21/19  6:26 PM

## 2019-12-22 NOTE — DISCHARGE SUMMARY
[facility] HOSPITALISTS DISCHARGE SUMMARY    Patient Identification:  Name:  Baldo Eden  Age:  64 y.o.  Sex:  female  :  1955  MRN:  [id number]  Visit Number:  [id number]    Date of Admission: 12/15/2019  Date of Discharge:  2019    PCP: Kashmir Garcia MD    DISCHARGE DIAGNOSIS  #Acute hypoxic respiratory failure and sepsis 2/2 aspiration pneumonia   #End stage Alzheimer's disease   #Hematemesis vs. Hemoptysis     CONSULTS   Palliative care     PROCEDURES PERFORMED  None     HOSPITAL COURSE  Patient is a 64 y.o. female presented on 12/15/19 to [facility] with respiratory distress and was found to be hypoxic.  Please see the admitting history and physical for further details.      Patient with end-stage alzhiemer's which started when she was 50 and had poor quality of life at baseline. Patient presented with acute respiratory failure 2/2 aspiration pneumonia. Family was clear they wanted patient DNI/DNR in the ED as ED provider evaluated patient was in extremis and would have been intubated otherwise. I admitted patient on BiPAP and started patient on antibiotics and diuresed her but symptoms did not improve. Family decided to make patient comfort care as it became clear she would not survive this illness with any meaningful recovery. Palliative care was consulted and helped titrate comfort meds during stay. Patient passed away on .     VITAL SIGNS:  Temp:  [102.4 °F (39.1 °C)-103.6 °F (39.8 °C)] 103 °F (39.4 °C)  Heart Rate:  [] 89  Resp:  [4-10] 5  SpO2:  [88 %-91 %] 91 %  on  Flow (L/min):  [4.5] 4.5;   Device (Oxygen Therapy): humidified;nasal cannula    Body mass index is 26.51 kg/m².  Wt Readings from Last 3 Encounters:   19 72.3 kg (159 lb 4.8 oz)   19 73 kg (161 lb)   18 49.6 kg (109 lb 6 oz)       PHYSICAL EXAM:  Gen: NAD. Laying in bed. NAD. Comfortable.    Head: Normocephalic and atraumatic.    Eyes: Conjunctivae and lids are  normal.   Cardiovascular: Normal rate, regular rhythm, S1 normal and S2 normal.   Pulmonary/Chest: Slightly improved breath sounds, still course bilaterally.   Abdominal: Soft. Bowel sounds are normal. G-tube in place   Neuro: Nonverbal. Lethargic.   Skin: Diaphoretic. Warm. No rashes noted.   MSK: No red or swollen joints.     DISCHARGE DISPOSITION   Stable    DISCHARGE MEDICATIONS:     Discharge Medications      ASK your doctor about these medications      Instructions Start Date   baclofen 10 MG tablet  Commonly known as:  LIORESAL   5 mg, Oral, 2 Times Daily      cholecalciferol 25 MCG (1000 UT) tablet  Commonly known as:  VITAMIN D3   1,000 Units, Oral, Daily      diphenhydrAMINE 25 mg capsule  Commonly known as:  BENADRYL   25 mg, Oral, Every Night at Bedtime      levothyroxine 150 MCG tablet  Commonly known as:  SYNTHROID, LEVOTHROID  Ask about: Which instructions should I use?   150 mcg, Oral, Daily      multivitamin tablet tablet   1 tablet, Oral, Daily      ramipril 5 MG capsule  Commonly known as:  ALTACE   5 mg, Oral, Daily      senna 8.6 MG tablet tablet  Commonly known as:  SENOKOT   1 tablet, Oral, Daily      traMADol 50 MG tablet  Commonly known as:  ULTRAM   50 mg, Oral, 3 Times Daily                    TEST  RESULTS PENDING AT DISCHARGE       CODE STATUS  Code Status and Medical Interventions:   Ordered at: 12/16/19 0954     Level Of Support Discussed With:    Health Care Surrogate    Next of Kin (If No Surrogate)     Code Status:    No CPR     Medical Interventions (Level of Support Prior to Arrest):    Comfort Measures       James Eden MD  [facility]  12/21/19  10:29 PM    Please note that this discharge summary required more than 30 minutes to complete.

## 2020-09-11 NOTE — PAYOR COMM NOTE
"[facility]   CHRIS GUERRERO  PHONE  [phone]  FAX  [phone]  NPI:  [id number]    PATIENT D/C 19      Baldo Eden (Dcsd. Female)     Date of Birth Social Security Number Address Home Phone MRN    1955  [address]  [facility] [address] [phone] [id number]    Hinduism Marital Status          Unknown        Admission Date Admission Type Admitting Provider Attending Provider Department, Room/Bed    12/15/19 Emergency James Eden MD  [facility], 3320/1P    Discharge Date Discharge Disposition Discharge Destination        2019               Attending Provider:  (none)   Allergies:  Aricept [Donepezil Hcl], Influenza Vaccines    Isolation:  None   Infection:  None   Code Status:  Prior    Ht:  165.1 cm (65\")   Wt:  72.3 kg (159 lb 4.8 oz)    Admission Cmt:  None   Principal Problem:  None                Active Insurance as of 12/15/2019     Primary Coverage     Payor Plan Insurance Group Employer/Plan Group    [employer] EMPLOYEE [id number]     Payor Plan Address Payor Plan Phone Number Payor Plan Fax Number Effective Dates    [address] [phone]  2018 - None Entered    [facility] [id number]       Subscriber Name Subscriber Birth Date Member ID       BALDO EDEN 1955 [id number]           Secondary Coverage     Payor Plan Insurance Group Employer/Plan Group    [place]      Payor Plan Address Payor Plan Phone Number Payor Plan Fax Number Effective Dates    [address] [phone]  10/7/2016 - None Entered    [facility] [address]       Subscriber Name Subscriber Birth Date Member ID       BALDO EDEN 1955 [id number]                 Emergency Contacts      (Rel.) Home Phone Work Phone Mobile Phone    ElodiaAmelia (Daughter) -- -- [phone]    KiritLibby (Daughter) -- -- [phone]              " Mom left message that Emile did not get the prescriptions sent yesterday.  They were re-faxed to [phone]. Margoth Jones RN,CDE

## (undated) DEVICE — Device

## (undated) DEVICE — SINGLE PORT MANIFOLD: Brand: NEPTUNE 2

## (undated) DEVICE — Device: Brand: DEFENDO AIR/WATER/SUCTION AND BIOPSY VALVE

## (undated) DEVICE — TUBING, SUCTION, 1/4" X 20', STRAIGHT: Brand: MEDLINE INDUSTRIES, INC.

## (undated) DEVICE — PERCUTANEOUS ENDOSCOPIC GASTROSTOMY KIT: Brand: ENDOVIVE SAFETY PEG KIT

## (undated) DEVICE — THE BITE BLOCK MAXI, LATEX FREE STRAP IS USED TO PROTECT THE ENDOSCOPE INSERTION TUBE FROM BEING BITTEN BY THE PATIENT.